# Patient Record
Sex: MALE | Race: WHITE | NOT HISPANIC OR LATINO | Employment: PART TIME | URBAN - NONMETROPOLITAN AREA
[De-identification: names, ages, dates, MRNs, and addresses within clinical notes are randomized per-mention and may not be internally consistent; named-entity substitution may affect disease eponyms.]

---

## 2017-01-04 ENCOUNTER — ANTICOAGULATION VISIT (OUTPATIENT)
Dept: CARDIOLOGY | Facility: CLINIC | Age: 82
End: 2017-01-04

## 2017-01-04 VITALS — SYSTOLIC BLOOD PRESSURE: 92 MMHG | DIASTOLIC BLOOD PRESSURE: 44 MMHG

## 2017-01-04 DIAGNOSIS — I48.91 ATRIAL FIBRILLATION, UNSPECIFIED TYPE (HCC): ICD-10-CM

## 2017-01-04 DIAGNOSIS — Z79.01 LONG-TERM (CURRENT) USE OF ANTICOAGULANTS: ICD-10-CM

## 2017-01-04 LAB — INR PPP: 1.9 (ref 0.9–1.1)

## 2017-01-04 PROCEDURE — 36416 COLLJ CAPILLARY BLOOD SPEC: CPT | Performed by: NURSE PRACTITIONER

## 2017-01-04 PROCEDURE — 85610 PROTHROMBIN TIME: CPT | Performed by: NURSE PRACTITIONER

## 2017-01-04 NOTE — MR AVS SNAPSHOT
Seth Gabriel Dinesh   1/4/2017   Anticoagulation Visit    Dept Phone:  [phone]   Encounter #:  [id number]    Provider:  Ely Dennison, RN   Department:  [facility] CARDIOLOGY                Your Full Care Plan              Your Updated Medication List          This list is accurate as of: 1/4/17  8:01 AM.  Always use your most recent med list.                aspirin 81 MG EC tablet       diltiaZEM  MG 24 hr capsule   Commonly known as:  CARDIZEM CD       ferrous sulfate 325 (65 FE) MG tablet       flecainide 50 MG tablet   Commonly known as:  TAMBOCOR   Take 1 tablet by mouth Every 12 (Twelve) Hours.       furosemide 40 MG tablet   Commonly known as:  LASIX       levothyroxine 50 MCG tablet   Commonly known as:  SYNTHROID, LEVOTHROID       potassium chloride 20 MEQ CR tablet   Commonly known as:  K-DUR,KLOR-CON       tamsulosin 0.4 MG capsule 24 hr capsule   Commonly known as:  FLOMAX       vitamin D3 5000 UNITS capsule capsule       warfarin 4 MG tablet   Commonly known as:  COUMADIN               We Performed the Following     POC INR       You Were Diagnosed With        Codes Comments    Atrial fibrillation, unspecified type     ICD-10-CM: I48.91  ICD-9-CM: 427.31     Long-term (current) use of anticoagulants     ICD-10-CM: Z79.01  ICD-9-CM: V58.61       Instructions     None    Patient Instructions History      Anticoagulation Summary as of 1/4/2017     INR goal 2.0-3.0   Today's INR 1.90!   Next INR check 1/11/2017    Indications   Atrial fibrillation [I48.91] [I48.91]  Long-term (current) use of anticoagulants [Z79.01]         January 2017 Details    Sun Mon Tue Wed Thu Fri Sat     1               2               3               4      4 mg   See details      5      4 mg         6      4 mg         7      4 mg           8      4 mg         9      4 mg         10      4 mg         11      4 mg         12               13               14                 15               16               17               18               19               20               21                 22               23               24               25               26               27               28                 29               30               31                    Date Details    This INR check       Date of next INR:  2017           Upcoming Appointments     Visit Type Date Time Department    ANTICOAGULATION 2017  8:15 AM [facility] CARDIOLOGY [facility]    ULTRASOUND 1/10/2017  1:00 PM [facility] HEART CARE [facility]    ANTICOAGULATION 2017  8:00 AM [facility] CARDIOLOGY [facility]    OFFICE VISIT 2017  2:00 PM [facility] HEART CARE [facility]      MyChart Signup     [facility] Viewpoint allows you to send messages to your doctor, view your test results, renew your prescriptions, schedule appointments, and more. To sign up, go to [employer] and click on the Sign Up Now link in the New User? box. Enter your Hipcamp Activation Code exactly as it appears below along with the last four digits of your Social Security Number and your Date of Birth () to complete the sign-up process. If you do not sign up before the expiration date, you must request a new code.    Hipcamp Activation Code: [email or web address]  Expires: 2017  8:00 AM    If you have questions, you can email [email or web address] or call [phone] to talk to our Hipcamp staff. Remember, Hipcamp is NOT to be used for urgent needs. For medical emergencies, dial 911.               Other Info from Your Visit           Your Appointments     2017  8:15 AM CST   Anticoagulation with COUMADIN CLINIC CARD [facility] CARDIOLOGY (--)    [address]  [address]  [facility] [address] [phone]            Jose Martin 10, 2017  1:00 PM CST   Ultrasound with [facility] (--[facility]    [address]  [facility] [address]    [phone]            Jan 11, 2017  8:00 AM CST   Anticoagulation with COUMADIN CLINIC CARD [facility] CARDIOLOGY (--)    [address] [place] [address] Dr  [address]  [facility] [address] [phone]            Jan 19, 2017  2:00 PM CST   Office Visit with Wu Mcarthur MD   [facility] CARDIOLOGY (--)    [address] [facility] [address]  [facility] [address] [phone]           Arrive 15 minutes prior to appointment.              Allergies     Not on File      Vital Signs     Blood Pressure                   92/44           Results     POC INR      Component Value Standard Range & Units    INR 1.90 0.9 - 1.1

## 2017-01-04 NOTE — PROGRESS NOTES
PT states compliant c tx. PT denies any new medications or bleeding issues. PT denies any s/s of blood clot. PT denies any missed doses or excessive k. Due to rise in INR and pt's lesser therapeutic dose, dose will be left the same but pt will be seen next week. PT instructed to hold green vegs for 2 days. Patient instructed regarding medication; results given and questions answered. Nutritional counseling given.  Dietary factors affecting therapy addressed.  Patient instructed to monitor for excessive bruising or bleeding. PT verbalizes understanding.   Electronically signed by LIZET Velez

## 2017-01-10 ENCOUNTER — OFFICE VISIT (OUTPATIENT)
Dept: CARDIOLOGY | Facility: CLINIC | Age: 82
End: 2017-01-10

## 2017-01-10 DIAGNOSIS — I35.9 NONRHEUMATIC AORTIC VALVE DISORDER, UNSPECIFIED: ICD-10-CM

## 2017-01-10 DIAGNOSIS — Z95.3 S/P AORTIC VALVE REPLACEMENT WITH BIOPROSTHETIC VALVE: ICD-10-CM

## 2017-01-10 DIAGNOSIS — I35.9 AORTIC VALVE DISORDER: ICD-10-CM

## 2017-01-10 DIAGNOSIS — R06.02 SOB (SHORTNESS OF BREATH): ICD-10-CM

## 2017-01-10 DIAGNOSIS — I48.0 PAROXYSMAL ATRIAL FIBRILLATION (HCC): ICD-10-CM

## 2017-01-10 PROCEDURE — 93306 TTE W/DOPPLER COMPLETE: CPT | Performed by: INTERNAL MEDICINE

## 2017-01-10 NOTE — MR AVS SNAPSHOT
Seth Montiel   1/10/2017 1:00 PM   Appointment    Dept Phone:  [phone]   Encounter #:  [id number]    Provider:  [facility]   Department:  [facility] CARDIOLOGY                Your Full Care Plan              Your Updated Medication List          This list is accurate as of: 1/10/17  1:13 PM.  Always use your most recent med list.                aspirin 81 MG EC tablet       diltiaZEM  MG 24 hr capsule   Commonly known as:  CARDIZEM CD       ferrous sulfate 325 (65 FE) MG tablet       flecainide 50 MG tablet   Commonly known as:  TAMBOCOR   Take 1 tablet by mouth Every 12 (Twelve) Hours.       furosemide 40 MG tablet   Commonly known as:  LASIX       levothyroxine 50 MCG tablet   Commonly known as:  SYNTHROID, LEVOTHROID       potassium chloride 20 MEQ CR tablet   Commonly known as:  K-DUR,KLOR-CON       tamsulosin 0.4 MG capsule 24 hr capsule   Commonly known as:  FLOMAX       vitamin D3 5000 UNITS capsule capsule       warfarin 4 MG tablet   Commonly known as:  COUMADIN               Instructions     None    Patient Instructions History      Upcoming Appointments     Visit Type Date Time Department    ULTRASOUND 1/10/2017  1:00 PM [facility]    ANTICOAGULATION 2017  8:00 AM [facility]    OFFICE VISIT 2017  2:00 PM [facility] Sign[facility]     [facility] Balakam allows you to send messages to your doctor, view your test results, renew your prescriptions, schedule appointments, and more. To sign up, go to [employer] and click on the Sign Up Now link in the New User? box. Enter your Balakam Activation Code exactly as it appears below along with the last four digits of your Social Security Number and your Date of Birth () to complete the sign-up process. If you do not sign up before the expiration date, you must request a new code.    Balakam Activation Code:  [email or web address]  Expires: 1/11/2017  8:00 AM    If you have questions, you can email [email or web address] or call [phone] to talk to our MyChart staff. Remember, Familiohart is NOT to be used for urgent needs. For medical emergencies, dial 911.               Other Info from Your Visit           Your Appointments     Jan 11, 2017  8:00 AM CST   Anticoagulation with COUMADIN CLINIC CARD [facility] CARDIOLOGY (--)    [address] [place] [address] [address]  [address] [place] [address] [phone]            Jan 19, 2017  2:00 PM CST   Office Visit with Wu Mcarthur MD   [facility] CARDIOLOGY (--)    [address] [address] [address]  [facility] [address] [phone]           Arrive 15 minutes prior to appointment.              Allergies     Not on File

## 2017-01-11 ENCOUNTER — ANTICOAGULATION VISIT (OUTPATIENT)
Dept: CARDIOLOGY | Facility: CLINIC | Age: 82
End: 2017-01-11

## 2017-01-11 VITALS — SYSTOLIC BLOOD PRESSURE: 102 MMHG | DIASTOLIC BLOOD PRESSURE: 44 MMHG | HEART RATE: 68 BPM

## 2017-01-11 DIAGNOSIS — Z79.01 LONG-TERM (CURRENT) USE OF ANTICOAGULANTS: ICD-10-CM

## 2017-01-11 DIAGNOSIS — I48.91 ATRIAL FIBRILLATION, UNSPECIFIED TYPE (HCC): ICD-10-CM

## 2017-01-11 LAB — INR PPP: 1.9 (ref 0.9–1.1)

## 2017-01-11 PROCEDURE — 85610 PROTHROMBIN TIME: CPT | Performed by: NURSE PRACTITIONER

## 2017-01-11 PROCEDURE — 99211 OFF/OP EST MAY X REQ PHY/QHP: CPT | Performed by: NURSE PRACTITIONER

## 2017-01-11 PROCEDURE — 36416 COLLJ CAPILLARY BLOOD SPEC: CPT | Performed by: NURSE PRACTITIONER

## 2017-01-11 NOTE — MR AVS SNAPSHOT
Seth Montiel   1/11/2017   Anticoagulation Visit    Dept Phone:  [phone]   Encounter #:  [id number]    Provider:  Enzo Henley RN   Department:  [facility] CARDIOLOGY                Your Full Care Plan              Your Updated Medication List          This list is accurate as of: 1/11/17  7:51 AM.  Always use your most recent med list.                aspirin 81 MG EC tablet       diltiaZEM  MG 24 hr capsule   Commonly known as:  CARDIZEM CD       ferrous sulfate 325 (65 FE) MG tablet       flecainide 50 MG tablet   Commonly known as:  TAMBOCOR   Take 1 tablet by mouth Every 12 (Twelve) Hours.       furosemide 40 MG tablet   Commonly known as:  LASIX       levothyroxine 50 MCG tablet   Commonly known as:  SYNTHROID, LEVOTHROID       potassium chloride 20 MEQ CR tablet   Commonly known as:  K-DUR,KLOR-CON       tamsulosin 0.4 MG capsule 24 hr capsule   Commonly known as:  FLOMAX       vitamin D3 5000 UNITS capsule capsule       warfarin 4 MG tablet   Commonly known as:  COUMADIN               We Performed the Following     POC INR       You Were Diagnosed With        Codes Comments    Atrial fibrillation, unspecified type     ICD-10-CM: I48.91  ICD-9-CM: 427.31     Long-term (current) use of anticoagulants     ICD-10-CM: Z79.01  ICD-9-CM: V58.61       Instructions     None    Patient Instructions History      Anticoagulation Summary as of 1/11/2017     INR goal 2.0-3.0   Today's INR 1.90!   Next INR check 1/18/2017    Indications   Atrial fibrillation [I48.91] [I48.91]  Long-term (current) use of anticoagulants [Z79.01]         January 2017 Details    Sun Mon Tue Wed Thu Fri Sat     1               2               3               4               5               6               7                 8               9               10               11      6 mg   See details      12      4 mg         13      4 mg         14      4 mg           15      4 mg          16      4 mg         17      4 mg         18      6 mg         19               20               21                 22               23               24               25               26               27               28                 29               30               31                    Date Details    This INR check       Date of next INR:  2017           Upcoming Appointments     Visit Type Date Time Department    ANTICOAGULATION 2017  8:00 AM [facility] CARDIOLOGY [facility]    ANTICOAGULATION 2017  8:15 AM [facility] CARDIOLOGY [facility]    OFFICE VISIT 2017  2:00 PM [facility] Sign[facility]     [facility] Pogoseat allows you to send messages to your doctor, view your test results, renew your prescriptions, schedule appointments, and more. To sign up, go to [employer] and click on the Sign Up Now link in the New User? box. Enter your Pogoseat Activation Code exactly as it appears below along with the last four digits of your Social Security Number and your Date of Birth () to complete the sign-up process. If you do not sign up before the expiration date, you must request a new code.    Pogoseat Activation Code: [email or web address]  Expires: 2017  8:00 AM    If you have questions, you can email [email or web address] or call [phone] to talk to our Pogoseat staff. Remember, Pogoseat is NOT to be used for urgent needs. For medical emergencies, dial 911.               Other Info from Your Visit           Your Appointments     2017  8:00 AM CST   Anticoagulation with COUMADIN CLINIC CARD [facility] CARDIOLOGY (--)    [address] [place] [address] Dr  [address] [place] [id number] [phone]            2017  8:15 AM CST   Anticoagulation with COUMADIN CLINIC CARD [facility] CARDIOLOGY (--)    [address] [place] [address] [address]  [address] [place] [id number] [phone]              2017  2:00 PM CST   Office Visit with Wu Mcarthur MD   [facility] CARDIOLOGY (--)    [address] [facility] [address]  [facility] [address] [phone]           Arrive 15 minutes prior to appointment.              Allergies     Not on File      Vital Signs     Blood Pressure Pulse                102/44 68          Results     POC INR      Component Value Standard Range & Units    INR 1.90 0.9 - 1.1

## 2017-01-11 NOTE — PROGRESS NOTES
Pt denies missed coumadin doses or consuming too much green.  Pt states medications have not changed, no bleeding issues at this time.  Adjusted coumadin dose slightly and instructed pt to limit green intake today.  Recheck INR next wk.  Pt verbalizes.  Patient instructed regarding medication; results given and questions answered. Nutritional counseling given.  Dietary factors affecting therapy addressed.  Patient instructed to monitor for excessive bruising or bleeding.  Electronically signed by LIZET Bey

## 2017-01-18 ENCOUNTER — ANTICOAGULATION VISIT (OUTPATIENT)
Dept: CARDIOLOGY | Facility: CLINIC | Age: 82
End: 2017-01-18

## 2017-01-18 VITALS — HEART RATE: 59 BPM | SYSTOLIC BLOOD PRESSURE: 108 MMHG | DIASTOLIC BLOOD PRESSURE: 52 MMHG

## 2017-01-18 DIAGNOSIS — Z79.01 LONG-TERM (CURRENT) USE OF ANTICOAGULANTS: ICD-10-CM

## 2017-01-18 DIAGNOSIS — I48.91 ATRIAL FIBRILLATION, UNSPECIFIED TYPE (HCC): ICD-10-CM

## 2017-01-18 LAB — INR PPP: 2.3 (ref 0.9–1.1)

## 2017-01-18 PROCEDURE — 36416 COLLJ CAPILLARY BLOOD SPEC: CPT | Performed by: NURSE PRACTITIONER

## 2017-01-18 PROCEDURE — 85610 PROTHROMBIN TIME: CPT | Performed by: NURSE PRACTITIONER

## 2017-01-18 NOTE — MR AVS SNAPSHOT
Seth Gabriel Dinesh   1/18/2017   Anticoagulation Visit    Dept Phone:  [phone]   Encounter #:  [id number]    Provider:  Ely Dennison, RN   Department:  [facility] CARDIOLOGY                Your Full Care Plan              Your Updated Medication List          This list is accurate as of: 1/18/17  8:13 AM.  Always use your most recent med list.                aspirin 81 MG EC tablet       diltiaZEM  MG 24 hr capsule   Commonly known as:  CARDIZEM CD       ferrous sulfate 325 (65 FE) MG tablet       flecainide 50 MG tablet   Commonly known as:  TAMBOCOR   Take 1 tablet by mouth Every 12 (Twelve) Hours.       furosemide 40 MG tablet   Commonly known as:  LASIX       levothyroxine 50 MCG tablet   Commonly known as:  SYNTHROID, LEVOTHROID       potassium chloride 20 MEQ CR tablet   Commonly known as:  K-DUR,KLOR-CON       tamsulosin 0.4 MG capsule 24 hr capsule   Commonly known as:  FLOMAX       vitamin D3 5000 UNITS capsule capsule       warfarin 4 MG tablet   Commonly known as:  COUMADIN               We Performed the Following     POC INR       You Were Diagnosed With        Codes Comments    Atrial fibrillation, unspecified type     ICD-10-CM: I48.91  ICD-9-CM: 427.31     Long-term (current) use of anticoagulants     ICD-10-CM: Z79.01  ICD-9-CM: V58.61       Instructions     None    Patient Instructions History      Anticoagulation Summary as of 1/18/2017     INR goal 2.0-3.0   Today's INR 2.30   Next INR check 2/1/2017    Indications   Atrial fibrillation [I48.91] [I48.91]  Long-term (current) use of anticoagulants [Z79.01]         January 2017 Details    Sun Mon Tue Wed Thu Fri Sat     1               2               3               4               5               6               7                 8               9               10               11               12               13               14                 15               16               17                  18      6 mg   See details      19      4 mg         20      4 mg         21      4 mg           22      4 mg         23      4 mg         24      4 mg         25      6 mg         26      4 mg         27      4 mg         28      4 mg           29      4 mg         30      4 mg         31      4 mg              Date Details   01/18 This INR check                 February 2017 Details    Sun Mon Tue Wed Thu Fri Sat        1      6 mg         2               3               4                 5               6               7               8               9               10               11                 12               13               14               15               16               17               18                 19               20               21               22               23               24               25                 26               27               28                    Date Details   No additional details    Date of next INR:  2/1/2017           Upcoming Appointments     Visit Type Date Time Department    ANTICOAGULATION 1/18/2017  8:15 AM [facility] CARDIOLOGY [facility]    OFFICE VISIT 1/19/2017  2:00 PM [facility] HEART CARE [facility]    ANTICOAGULATION 2/1/2017  8:00 AM [facility] CARDIOLOGY [facility]      MyChart Signup     Our records indicate that you have declined [facility] TapMyBackt signup. If you would like to sign up for TapMyBackt, please email [email or web address] or call [phone] to obtain an activation code.             Other Info from Your Visit           Your Appointments     Jan 18, 2017  8:15 AM CST   Anticoagulation with COUMADIN CLINIC CARD [facility] CARDIOLOGY (--)    [address] [place] [address] Dr  [address]  [facility] [address] [phone]            Jan 19, 2017  2:00 PM CST   Office Visit with Wu Mcarthur MD   [facility] CARDIOLOGY (--)    [address]  [facility] [address] [phone]            Arrive 15 minutes prior to appointment.            Feb 01, 2017  8:00 AM CST   Anticoagulation with COUMADIN CLINIC CARD [facility] CARDIOLOGY (--)    [address] [place] [address] [address]  [address] [place] [address] [phone]              Allergies     Not on File      Vital Signs     Blood Pressure Pulse                108/52 59          Results     POC INR      Component Value Standard Range & Units    INR 2.30 0.9 - 1.1

## 2017-01-18 NOTE — PROGRESS NOTES
PT states compliant c tx. PT denies any new medications or bleeding issues. PT denies any s/s of blood clot. PT instructed to continue same dose and Coumadin friendly diet. PT will be seen in 2 weeks at his request. Patient instructed regarding medication; results given and questions answered. Nutritional counseling given.  Dietary factors affecting therapy addressed.  Patient instructed to monitor for excessive bruising or bleeding.  Electronically signed by LIZET Velez

## 2017-01-19 ENCOUNTER — OFFICE VISIT (OUTPATIENT)
Dept: CARDIOLOGY | Facility: CLINIC | Age: 82
End: 2017-01-19

## 2017-01-19 VITALS
SYSTOLIC BLOOD PRESSURE: 120 MMHG | BODY MASS INDEX: 25.33 KG/M2 | DIASTOLIC BLOOD PRESSURE: 54 MMHG | WEIGHT: 171 LBS | HEART RATE: 57 BPM | HEIGHT: 69 IN

## 2017-01-19 DIAGNOSIS — I35.9 AORTIC VALVE DISORDER: Primary | ICD-10-CM

## 2017-01-19 DIAGNOSIS — Z79.01 LONG-TERM (CURRENT) USE OF ANTICOAGULANTS: ICD-10-CM

## 2017-01-19 DIAGNOSIS — R06.02 SOB (SHORTNESS OF BREATH): ICD-10-CM

## 2017-01-19 DIAGNOSIS — I48.91 ATRIAL FIBRILLATION, UNSPECIFIED TYPE (HCC): ICD-10-CM

## 2017-01-19 PROCEDURE — 93000 ELECTROCARDIOGRAM COMPLETE: CPT | Performed by: INTERNAL MEDICINE

## 2017-01-19 PROCEDURE — 99213 OFFICE O/P EST LOW 20 MIN: CPT | Performed by: INTERNAL MEDICINE

## 2017-01-19 NOTE — MR AVS SNAPSHOT
Seth Montiel   1/19/2017 2:00 PM   Office Visit    Dept Phone:  [phone]   Encounter #:  [id number]    Provider:  Wu Mcarthur MD   Department:  [facility] CARDIOLOGY                Your Full Care Plan              Your Updated Medication List          This list is accurate as of: 1/19/17  1:32 PM.  Always use your most recent med list.                aspirin 81 MG EC tablet       diltiaZEM  MG 24 hr capsule   Commonly known as:  CARDIZEM CD       ferrous sulfate 325 (65 FE) MG tablet       flecainide 50 MG tablet   Commonly known as:  TAMBOCOR   Take 1 tablet by mouth Every 12 (Twelve) Hours.       furosemide 40 MG tablet   Commonly known as:  LASIX       levothyroxine 50 MCG tablet   Commonly known as:  SYNTHROID, LEVOTHROID       potassium chloride 20 MEQ CR tablet   Commonly known as:  K-DUR,KLOR-CON       tamsulosin 0.4 MG capsule 24 hr capsule   Commonly known as:  FLOMAX       vitamin D3 5000 UNITS capsule capsule       warfarin 4 MG tablet   Commonly known as:  COUMADIN               You Were Diagnosed With        Codes Comments    Aortic valve disorder    -  Primary ICD-10-CM: I35.9  ICD-9-CM: 424.1     SOB (shortness of breath)     ICD-10-CM: R06.02  ICD-9-CM: 786.05     Long-term (current) use of anticoagulants     ICD-10-CM: Z79.01  ICD-9-CM: V58.61     Atrial fibrillation, unspecified type     ICD-10-CM: I48.91  ICD-9-CM: 427.31       Instructions     None    Patient Instructions History      Upcoming Appointments     Visit Type Date Time Department    OFFICE VISIT 1/19/2017  2:00 PM [facility] HEART [facility]    ANTICOAGULATION 2/1/2017  8:00 AM [facility] CARDIOLOGY [facility]    OFFICE VISIT 7/20/2017  2:00 PM [facility]      MyChart Signup     Our records indicate that you have declined [facility] signup. If you would like to sign up for [facility], please email [email or web address] or call [phone] to obtain an  "activation code.             Other Info from Your Visit           Your Appointments     Jan 19, 2017  2:00 PM CST   Office Visit with Wu Mcarthur MD   [facility] CARDIOLOGY (--)    [address]  [facility] [address] [phone]           Arrive 15 minutes prior to appointment.            Feb 01, 2017  8:00 AM CST   Anticoagulation with COUMADIN CLINIC CARD [facility] CARDIOLOGY (--)    [address] [place] [address] [address]  [address] [place] [address] [phone]            Jul 20, 2017  2:00 PM CDT   Office Visit with Wu Mcarthur MD   [facility] CARDIOLOGY (--)    [address]  [facility] [address] [phone]           Arrive 15 minutes prior to appointment.              Allergies     No Known Allergies      Vital Signs     Blood Pressure Pulse Height Weight Body Mass Index Smoking Status    120/54 57 69\" (175.3 cm) 171 lb (77.6 kg) 25.25 kg/m2 Never Smoker      Problems and Diagnoses Noted     Aortic heart valve disorder    SOB (shortness of breath)        "

## 2017-01-19 NOTE — PROGRESS NOTES
Seth Montiel  81 y.o. male    01/19/2017  1. Aortic valve disorder    2. SOB (shortness of breath)    3. Long-term (current) use of anticoagulants    4. Atrial fibrillation, unspecified type        History of Present Illness    Mr. Montiel is here for follow-up of his multiple cardiac problems.  He denied any chest pain but does have chronic dyspnea on exertion which is most likely secondary to COPD.  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  Will be followed closely.  No signs of congestive heart failure or bronchospasm was noted today.  He is on home oxygen.  Blood pressure was in the normal range.  EKG showed sinus rhythm with a heart rate of 57 bpm with voltage criteria for left ventricular hypertrophy.  He has remained in sinus rhythm and has been compliant with anticoagulation with the Coumadin.  His PT and [facility]R [facility], and is in the therapeutic range.        SUBJECTIVE    No Known Allergies      Past Medical History   Diagnosis Date   • Aortic valve disorder    • Atrial fibrillation    • COPD (chronic obstructive pulmonary disease)    • Hyperlipidemia    • Hypertension    • Nonrheumatic aortic valve disorder, unspecified    • SOB (shortness of breath)          Past Surgical History   Procedure Laterality Date   • Cardiac surgery     • Rotator cuff repair     • Appendectomy     • Gallbladder surgery           No family history on file.      Social History     Social History   • Marital status:      Spouse name: N/A   • Number of children: N/A   • Years of education: N/A     Occupational History   • Not on file.     Social History Main Topics   • Smoking status: Never Smoker   • Smokeless tobacco: Never Used   • Alcohol use Yes   • Drug use: No   • Sexual activity: Defer     Other Topics Concern   • Not on file     Social History Narrative  "        Current Outpatient Prescriptions   Medication Sig Dispense Refill   • aspirin 81 MG EC tablet Take 81 mg by mouth daily.     • Cholecalciferol (VITAMIN D3) 5000 UNITS capsule capsule Take 5,000 Units by mouth daily.     • diltiazem CD (CARDIZEM CD) 240 MG 24 hr capsule Take 240 mg by mouth daily.     • ferrous sulfate 325 (65 FE) MG tablet Take 325 mg by mouth 2 (two) times a day.     • flecainide (TAMBOCOR) 50 MG tablet Take 1 tablet by mouth Every 12 (Twelve) Hours. 60 tablet 6   • furosemide (LASIX) 40 MG tablet Take 40 mg by mouth 2 (two) times a day.     • levothyroxine (SYNTHROID, LEVOTHROID) 50 MCG tablet Take 50 mcg by mouth daily.     • potassium chloride (K-DUR,KLOR-CON) 20 MEQ CR tablet Take 20 mEq by mouth daily.     • tamsulosin (FLOMAX) 0.4 MG capsule 24 hr capsule Take 1 capsule by mouth every night.     • warfarin (COUMADIN) 4 MG tablet Take 4 mg by mouth daily. As directed per [facility]       No current facility-administered medications for this visit.          OBJECTIVE    Visit Vitals   • /54   • Pulse 57   • Ht 69\" (175.3 cm)   • Wt 171 lb (77.6 kg)   • BMI 25.25 kg/m2           Review of Systems     Constitutional:  Denies recent weight loss, weight gain, fever or chills, no change in exercise tolerance     HENT:  Denies any hearing loss, epistaxis, hoarseness, or difficulty speaking.     Eyes: Wears eyeglasses or contact lenses     Respiratory:  Dyspnea with exertion,no cough, wheezing, or hemoptysis.     Cardiovascular: Negative for palpations, chest pain, orthopnea, PND, peripheral edema, syncope, or claudication.     Gastrointestinal:  Denies change in bowel habits, dyspepsia, ulcer disease, hematochezia, or melena.     Endocrine: Negative for cold intolerance, heat intolerance, polydipsia, polyphagia and polyuria. Denies any history of weight change, heat/cold intolerance, polydipsia, polyuria     Genitourinary: Negative.      Musculoskeletal: Denies any history of " arthritic symptoms or back problems     Skin:  Denies any change in hair or nails, rashes, or skin lesions.     Allergic/Immunologic: Negative.  Negative for environmental allergies, food allergies and immunocompromised state.     Neurological:  Denies any history of recurrent headaches, strokes, TIA, or seizure disorder.     Hematological: Denies any food allergies, seasonal allergies, bleeding disorders, or lymphadenopathy.     Psychiatric/Behavioral: Denies any history of depression, substance abuse, or change in cognitive function.         Physical Exam     Constitutional: Cooperative, alert and oriented, chronically ill appearing    HENT:   Head: Normocephalic, normal hair patterns, no masses or tenderness.  Ears, Nose, and Throat: No gross abnormalities. No pallor or cyanosis. Dentition good.   Eyes: EOMS intact, PERRL, conjunctivae and lids unremarkable. Fundoscopic exam and visual fields not performed.   Neck: No palpable masses or adenopathy, no thyromegaly, no JVD, carotid pulses are full and equal bilaterally and without  Bruits.     Cardiovascular: Regular rhythm, S1 and S2 normal, no S3 or S4. Apical impulse not displaced. No murmurs, gallops, or rubs detected.     Pulmonary/Chest: Chest: Increased AP diameter, no tenderness to palpation, normal respiratory excursion, no intercostal retraction, no use of accessory muscles.            Pulmonary: Normal breath sounds. No rales or ronchi.    Abdominal: Abdomen soft, bowel sounds normoactive, no masses, no hepatosplenomegaly, non-tender, no bruits.     Musculoskeletal: No deformities, clubbing, cyanosis, erythema, or edema observed. There are no spinal abnormalities noted. Normal muscle strength and tone. Pulses full and equal in all extremities, no bruits auscultated.     Neurological: No gross motor or sensory deficits noted, affect appropriate, oriented to time, person, place.     Skin: Warm and dry to the touch, no apparent skin lesions or masses noted.      Psychiatric: He has a normal mood and affect. His behavior is normal. Judgment and thought content normal.           ECG 12 Lead  Date/Time: 1/19/2017 2:22 PM  Performed by: CHIVO MCARTHUR  Authorized by: CHIVO MCARTHUR   Comparison: not compared with previous ECG   Rhythm: sinus rhythm and sinus bradycardia  Rate: normal  QRS axis: normal  Comments: EKG showed sinus rhythm with a heart rate of 57 bpm.  QRS duration was 102 ms.  There was left ventricular hypertrophy.  QTc interval was 420 ms.              Lab Results   Component Value Date    WBC 6.6 07/19/2016    HGB 9.6 (L) 07/19/2016    HCT 29.7 (L) 07/19/2016    MCV 92.5 07/19/2016     07/19/2016     Lab Results   Component Value Date    GLUCOSE 76 07/19/2016    BUN 27 (H) 07/19/2016    CREATININE 1.3 07/19/2016    CO2 43 (H) 07/19/2016    CALCIUM 7.8 (L) 07/19/2016    ALBUMIN 2.7 (L) 07/17/2016    AST 44 07/17/2016    ALT 55 07/17/2016     No results found for: CHOL  No results found for: TRIG  No results found for: HDL  No results found for: LDLCALC  No results found for: LDL  No results found for: HDLLDLRATIO  No components found for: CHOLHDL  No results found for: HGBA1C  Lab Results   Component Value Date    TSH 3.75 04/22/2015           ASSESSMENT AND PLAN  Mr. Montiel has multiple cardiac issues but fortunately is compensated with no evidence of congestive heart failure or angina.  He remains in sinus rhythm.  QTc interval was 420 ms.  Isn't antiplatelet therapy with aspirin, antiarrhythmic therapy with flecainide, anticoagulation with warfarin and rate control with diltiazem CD have been continued.  He is on diuretics on a regular basis.    Diagnoses and all orders for this visit:    Aortic valve disorder    SOB (shortness of breath)    Long-term (current) use of anticoagulants    Atrial fibrillation, unspecified type        Chivo Mcarthur MD  1/19/2017  2:16 PM

## 2017-02-01 ENCOUNTER — ANTICOAGULATION VISIT (OUTPATIENT)
Dept: CARDIAC SURGERY | Facility: CLINIC | Age: 82
End: 2017-02-01

## 2017-02-01 VITALS — SYSTOLIC BLOOD PRESSURE: 112 MMHG | DIASTOLIC BLOOD PRESSURE: 50 MMHG

## 2017-02-01 DIAGNOSIS — Z79.01 LONG-TERM (CURRENT) USE OF ANTICOAGULANTS: ICD-10-CM

## 2017-02-01 DIAGNOSIS — I48.91 ATRIAL FIBRILLATION, UNSPECIFIED TYPE (HCC): ICD-10-CM

## 2017-02-01 LAB — INR PPP: 2.7 (ref 0.9–1.1)

## 2017-02-01 PROCEDURE — 36416 COLLJ CAPILLARY BLOOD SPEC: CPT | Performed by: NURSE PRACTITIONER

## 2017-02-01 PROCEDURE — 85610 PROTHROMBIN TIME: CPT | Performed by: NURSE PRACTITIONER

## 2017-02-01 NOTE — PROGRESS NOTES
Pt states compliant c tx. PT denies any new medications or bleeding issues. PT denies any s/s of blood clot. PT instructed to continue same dose and Coumadin friendly diet. PT will be seen in 3 weeks. Patient instructed regarding medication; results given and questions answered. Nutritional counseling given.  Dietary factors affecting therapy addressed.  Patient instructed to monitor for excessive bruising or bleeding. PT verbalizes understanding.   Electronically signed by LIZET Velez

## 2017-02-22 ENCOUNTER — ANTICOAGULATION VISIT (OUTPATIENT)
Dept: CARDIAC SURGERY | Facility: CLINIC | Age: 82
End: 2017-02-22

## 2017-02-22 VITALS
HEART RATE: 60 BPM | DIASTOLIC BLOOD PRESSURE: 48 MMHG | BODY MASS INDEX: 25.99 KG/M2 | SYSTOLIC BLOOD PRESSURE: 100 MMHG | WEIGHT: 176 LBS

## 2017-02-22 DIAGNOSIS — Z79.01 LONG-TERM (CURRENT) USE OF ANTICOAGULANTS: ICD-10-CM

## 2017-02-22 DIAGNOSIS — I48.91 ATRIAL FIBRILLATION, UNSPECIFIED TYPE (HCC): ICD-10-CM

## 2017-02-22 LAB — INR PPP: 2.3 (ref 0.9–1.1)

## 2017-02-22 PROCEDURE — 85610 PROTHROMBIN TIME: CPT | Performed by: NURSE PRACTITIONER

## 2017-02-22 NOTE — PROGRESS NOTES
Patient states no med changes or bleeding problems or unexplained bruising. Patient instructed to continue current dosing schedule. Verbalizes understanding. Will recheck 1 month.   Patient instructed regarding medication; results given and questions answered. Nutritional counseling given.  Dietary factors affecting therapy addressed.  Patient instructed to monitor for excessive bruising or bleeding.  Electronically signed by LIZET Velez

## 2017-03-22 ENCOUNTER — ANTICOAGULATION VISIT (OUTPATIENT)
Dept: CARDIAC SURGERY | Facility: CLINIC | Age: 82
End: 2017-03-22

## 2017-03-22 VITALS — SYSTOLIC BLOOD PRESSURE: 118 MMHG | DIASTOLIC BLOOD PRESSURE: 56 MMHG | HEART RATE: 64 BPM

## 2017-03-22 DIAGNOSIS — I48.91 ATRIAL FIBRILLATION, UNSPECIFIED TYPE (HCC): ICD-10-CM

## 2017-03-22 DIAGNOSIS — Z79.01 LONG-TERM (CURRENT) USE OF ANTICOAGULANTS: ICD-10-CM

## 2017-03-22 LAB — INR PPP: 1.5 (ref 0.9–1.1)

## 2017-03-22 PROCEDURE — 99211 OFF/OP EST MAY X REQ PHY/QHP: CPT | Performed by: NURSE PRACTITIONER

## 2017-03-22 PROCEDURE — 85610 PROTHROMBIN TIME: CPT | Performed by: NURSE PRACTITIONER

## 2017-03-22 NOTE — PROGRESS NOTES
Pt denies missed coumadin doses or consuming too  Much green.  Pt states medications have not changes, no bleeding issues.  Adjusted coumadin dose and instructed pt to limit green intake for 3 days.  Recheck INR next wk.  Pt verbalizes. Patient instructed regarding medication; results given and questions answered. Nutritional counseling given.  Dietary factors affecting therapy addressed.  Patient instructed to monitor for excessive bruising or bleeding.  Electronically signed by LIZET Velez

## 2017-03-28 ENCOUNTER — ANTICOAGULATION VISIT (OUTPATIENT)
Dept: CARDIAC SURGERY | Facility: CLINIC | Age: 82
End: 2017-03-28

## 2017-03-28 VITALS — SYSTOLIC BLOOD PRESSURE: 94 MMHG | DIASTOLIC BLOOD PRESSURE: 57 MMHG | HEART RATE: 72 BPM

## 2017-03-28 DIAGNOSIS — Z79.01 LONG-TERM (CURRENT) USE OF ANTICOAGULANTS: ICD-10-CM

## 2017-03-28 DIAGNOSIS — I48.91 ATRIAL FIBRILLATION, UNSPECIFIED TYPE (HCC): ICD-10-CM

## 2017-03-28 LAB — INR PPP: 2.3 (ref 0.9–1.1)

## 2017-03-28 PROCEDURE — 85610 PROTHROMBIN TIME: CPT | Performed by: NURSE PRACTITIONER

## 2017-03-28 PROCEDURE — 99211 OFF/OP EST MAY X REQ PHY/QHP: CPT | Performed by: NURSE PRACTITIONER

## 2017-03-28 NOTE — PROGRESS NOTES
Pt denies med changes or bleeding problems. Due to rapid rise in INR pt will be placed on most recent therapeutic dose and rechecked in 2 weeks. Patient instructed regarding medication; results given and questions answered. Nutritional counseling given.  Dietary factors affecting therapy addressed.  Patient instructed to monitor for excessive bruising or bleeding.           This document has been electronically signed by LIZET Bey on March 28, 2017 9:59 AM

## 2017-04-11 ENCOUNTER — ANTICOAGULATION VISIT (OUTPATIENT)
Dept: CARDIAC SURGERY | Facility: CLINIC | Age: 82
End: 2017-04-11

## 2017-04-11 VITALS — DIASTOLIC BLOOD PRESSURE: 53 MMHG | HEART RATE: 72 BPM | SYSTOLIC BLOOD PRESSURE: 105 MMHG

## 2017-04-11 DIAGNOSIS — Z79.01 LONG-TERM (CURRENT) USE OF ANTICOAGULANTS: ICD-10-CM

## 2017-04-11 DIAGNOSIS — I48.91 ATRIAL FIBRILLATION, UNSPECIFIED TYPE (HCC): ICD-10-CM

## 2017-04-11 LAB — INR PPP: 2.2 (ref 0.9–1.1)

## 2017-04-11 PROCEDURE — 85610 PROTHROMBIN TIME: CPT | Performed by: NURSE PRACTITIONER

## 2017-04-11 NOTE — PROGRESS NOTES
Pt denies med changes or bleeding problems. Patient instructed regarding medication; results given and questions answered. Nutritional counseling given.  Dietary factors affecting therapy addressed.  Patient instructed to monitor for excessive bruising or bleeding. Will recheck in 3 weeks.          This document has been electronically signed by LIZET Bey on April 11, 2017 9:28 AM

## 2017-05-02 ENCOUNTER — ANTICOAGULATION VISIT (OUTPATIENT)
Dept: CARDIAC SURGERY | Facility: CLINIC | Age: 82
End: 2017-05-02

## 2017-05-02 VITALS — SYSTOLIC BLOOD PRESSURE: 116 MMHG | DIASTOLIC BLOOD PRESSURE: 56 MMHG | HEART RATE: 80 BPM

## 2017-05-02 DIAGNOSIS — I48.91 ATRIAL FIBRILLATION, UNSPECIFIED TYPE (HCC): ICD-10-CM

## 2017-05-02 DIAGNOSIS — Z79.01 LONG-TERM (CURRENT) USE OF ANTICOAGULANTS: ICD-10-CM

## 2017-05-02 LAB — INR PPP: 2 (ref 0.9–1.1)

## 2017-05-02 PROCEDURE — 85610 PROTHROMBIN TIME: CPT | Performed by: NURSE PRACTITIONER

## 2017-05-30 ENCOUNTER — ANTICOAGULATION VISIT (OUTPATIENT)
Dept: CARDIAC SURGERY | Facility: CLINIC | Age: 82
End: 2017-05-30

## 2017-05-30 VITALS — DIASTOLIC BLOOD PRESSURE: 60 MMHG | SYSTOLIC BLOOD PRESSURE: 178 MMHG | HEART RATE: 64 BPM

## 2017-05-30 DIAGNOSIS — Z79.01 LONG-TERM (CURRENT) USE OF ANTICOAGULANTS: ICD-10-CM

## 2017-05-30 DIAGNOSIS — I48.91 ATRIAL FIBRILLATION, UNSPECIFIED TYPE (HCC): ICD-10-CM

## 2017-05-30 LAB — INR PPP: 1.5 (ref 0.9–1.1)

## 2017-05-30 PROCEDURE — 99211 OFF/OP EST MAY X REQ PHY/QHP: CPT | Performed by: NURSE PRACTITIONER

## 2017-05-30 PROCEDURE — 85610 PROTHROMBIN TIME: CPT | Performed by: NURSE PRACTITIONER

## 2017-06-02 RX ORDER — FLECAINIDE ACETATE 50 MG/1
TABLET ORAL
Qty: 60 TABLET | Refills: 6 | Status: SHIPPED | OUTPATIENT
Start: 2017-06-02 | End: 2018-02-16 | Stop reason: SDUPTHER

## 2017-06-05 ENCOUNTER — ANTICOAGULATION VISIT (OUTPATIENT)
Dept: CARDIAC SURGERY | Facility: CLINIC | Age: 82
End: 2017-06-05

## 2017-06-05 VITALS — SYSTOLIC BLOOD PRESSURE: 112 MMHG | DIASTOLIC BLOOD PRESSURE: 44 MMHG

## 2017-06-05 DIAGNOSIS — I48.91 ATRIAL FIBRILLATION, UNSPECIFIED TYPE (HCC): ICD-10-CM

## 2017-06-05 DIAGNOSIS — Z79.01 LONG-TERM (CURRENT) USE OF ANTICOAGULANTS: ICD-10-CM

## 2017-06-05 LAB — INR PPP: 1.8 (ref 0.9–1.1)

## 2017-06-05 PROCEDURE — 99211 OFF/OP EST MAY X REQ PHY/QHP: CPT | Performed by: NURSE PRACTITIONER

## 2017-06-05 PROCEDURE — 85610 PROTHROMBIN TIME: CPT | Performed by: NURSE PRACTITIONER

## 2017-06-05 NOTE — PROGRESS NOTES
PT states compliant c tx. PT denies any new medications or bleeding issues. PT denies any s/s of blood clot. PT denies any missed doses or excessive k. Adjusted pt's dose and instructed to hold green vegs for 2 days. PT will be seen next week. Patient instructed regarding medication; results given and questions answered. Nutritional counseling given.  Dietary factors affecting therapy addressed.  Patient instructed to monitor for excessive bruising or bleeding. PT verbalizes understanding.   Electronically signed by LIZET Velez

## 2017-06-12 ENCOUNTER — ANTICOAGULATION VISIT (OUTPATIENT)
Dept: CARDIAC SURGERY | Facility: CLINIC | Age: 82
End: 2017-06-12

## 2017-06-12 VITALS — SYSTOLIC BLOOD PRESSURE: 142 MMHG | DIASTOLIC BLOOD PRESSURE: 58 MMHG

## 2017-06-12 DIAGNOSIS — Z71.89 ENCOUNTER FOR ANTICOAGULATION DISCUSSION AND COUNSELING: Primary | ICD-10-CM

## 2017-06-12 DIAGNOSIS — Z79.01 LONG-TERM (CURRENT) USE OF ANTICOAGULANTS: ICD-10-CM

## 2017-06-12 DIAGNOSIS — I48.91 ATRIAL FIBRILLATION, UNSPECIFIED TYPE (HCC): ICD-10-CM

## 2017-06-12 LAB — INR PPP: 2.2 (ref 0.9–1.1)

## 2017-06-12 PROCEDURE — 85610 PROTHROMBIN TIME: CPT | Performed by: NURSE PRACTITIONER

## 2017-06-12 NOTE — PROGRESS NOTES
PT states compliant c tx. PT denies any new medications or bleeding issues. PT denies any s/s of blood clot. PT instructed to continue same dose and Coumadin friendly diet. PT will be seen in 10 days. Patient instructed regarding medication; results given and questions answered. Nutritional counseling given.  Dietary factors affecting therapy addressed.  Patient instructed to monitor for excessive bruising or bleeding. PT verbalizes understanding.   Electronically signed by LIZET Velez

## 2017-06-22 ENCOUNTER — ANTICOAGULATION VISIT (OUTPATIENT)
Dept: CARDIAC SURGERY | Facility: CLINIC | Age: 82
End: 2017-06-22

## 2017-06-22 VITALS — DIASTOLIC BLOOD PRESSURE: 78 MMHG | HEART RATE: 64 BPM | SYSTOLIC BLOOD PRESSURE: 162 MMHG

## 2017-06-22 DIAGNOSIS — I48.91 ATRIAL FIBRILLATION, UNSPECIFIED TYPE (HCC): ICD-10-CM

## 2017-06-22 DIAGNOSIS — Z79.01 LONG-TERM (CURRENT) USE OF ANTICOAGULANTS: ICD-10-CM

## 2017-06-22 LAB — INR PPP: 2.1 (ref 0.9–1.1)

## 2017-06-22 PROCEDURE — 85610 PROTHROMBIN TIME: CPT | Performed by: NURSE PRACTITIONER

## 2017-06-22 NOTE — PROGRESS NOTES
PT states compliant c tx. PT denies any new medications or bleeding issues. PT denies any s/s of blood clot. PT instructed to continue same dose and Coumadin friendly diet. PT will be seen in 2 weeks. Patient instructed regarding medication; results given and questions answered. Nutritional counseling given.  Dietary factors affecting therapy addressed.  Patient instructed to monitor for excessive bruising or bleeding. PT verbalizes understanding.           This document has been electronically signed by LIZET eBy on June 22, 2017 1:19 PM

## 2017-07-06 ENCOUNTER — ANTICOAGULATION VISIT (OUTPATIENT)
Dept: CARDIAC SURGERY | Facility: CLINIC | Age: 82
End: 2017-07-06

## 2017-07-06 VITALS — HEART RATE: 68 BPM | DIASTOLIC BLOOD PRESSURE: 60 MMHG | SYSTOLIC BLOOD PRESSURE: 123 MMHG

## 2017-07-06 DIAGNOSIS — I48.91 ATRIAL FIBRILLATION, UNSPECIFIED TYPE (HCC): ICD-10-CM

## 2017-07-06 DIAGNOSIS — Z79.01 LONG-TERM (CURRENT) USE OF ANTICOAGULANTS: ICD-10-CM

## 2017-07-06 LAB — INR PPP: 2.7 (ref 0.9–1.1)

## 2017-07-06 PROCEDURE — 85610 PROTHROMBIN TIME: CPT | Performed by: NURSE PRACTITIONER

## 2017-07-06 NOTE — PROGRESS NOTES
Pt denies med changes or bleeding problems. Patient instructed regarding medication; results given and questions answered. Nutritional counseling given.  Dietary factors affecting therapy addressed.  Patient instructed to monitor for excessive bruising or bleeding. Will recheck in 3 weeks.  Electronically signed by LIZET Velez

## 2017-07-20 ENCOUNTER — OFFICE VISIT (OUTPATIENT)
Dept: CARDIOLOGY | Facility: CLINIC | Age: 82
End: 2017-07-20

## 2017-07-20 VITALS
DIASTOLIC BLOOD PRESSURE: 54 MMHG | WEIGHT: 172 LBS | HEIGHT: 69 IN | HEART RATE: 57 BPM | BODY MASS INDEX: 25.48 KG/M2 | SYSTOLIC BLOOD PRESSURE: 116 MMHG

## 2017-07-20 DIAGNOSIS — R06.02 SOB (SHORTNESS OF BREATH): ICD-10-CM

## 2017-07-20 DIAGNOSIS — I48.91 ATRIAL FIBRILLATION, UNSPECIFIED TYPE (HCC): Primary | ICD-10-CM

## 2017-07-20 DIAGNOSIS — I48.0 PAROXYSMAL ATRIAL FIBRILLATION (HCC): ICD-10-CM

## 2017-07-20 DIAGNOSIS — I35.9 AORTIC VALVE DISORDER: ICD-10-CM

## 2017-07-20 PROCEDURE — 93000 ELECTROCARDIOGRAM COMPLETE: CPT | Performed by: INTERNAL MEDICINE

## 2017-07-20 PROCEDURE — 99213 OFFICE O/P EST LOW 20 MIN: CPT | Performed by: INTERNAL MEDICINE

## 2017-07-20 NOTE — PROGRESS NOTES
Seth Montiel  81 y.o. male    07/20/2017  1. Atrial fibrillation, unspecified type    2. Paroxysmal atrial fibrillation    3. SOB (shortness of breath)    4. Aortic valve disorder        History of Present Illness     is here for follow-up of his above stated problems.  He denied any chest pain but does have chronic NYHA class II to class III dyspnea on exertion which is unchanged from before.  He uses home oxygen at night and inhaler several times during the day.  Clinically his prosthetic valve is functioning well.  He has remained in sinus rhythm and EKG confirmed sinus bradycardia with a heart rate of 57 bpm.  QTc interval was 4:30 milliseconds.  Nonspecific T-wave changes and poor R wave progression in leads V1 to V3 were noted.  No signs of congestive heart failure was noted.  He has tolerated flecainide quite well.        SUBJECTIVE    No Known Allergies      Past Medical History:   Diagnosis Date   • Aortic valve disorder    • Atrial fibrillation    • COPD (chronic obstructive pulmonary disease)    • Hyperlipidemia    • Hypertension    • Nonrheumatic aortic valve disorder, unspecified    • SOB (shortness of breath)          Past Surgical History:   Procedure Laterality Date   • AORTIC VALVE SURGERY     • APPENDECTOMY     • CARDIAC SURGERY     • GALLBLADDER SURGERY     • ROTATOR CUFF REPAIR           No family history on file.      Social History     Social History   • Marital status:      Spouse name: N/A   • Number of children: N/A   • Years of education: N/A     Occupational History   • Not on file.     Social History Main Topics   • Smoking status: Former Smoker   • Smokeless tobacco: Never Used   • Alcohol use Yes      Comment: social   • Drug use: No   • Sexual activity: Defer     Other Topics Concern   • Not on file     Social History Narrative         Current Outpatient Prescriptions   Medication Sig Dispense Refill   • aspirin 81 MG EC tablet Take 81 mg by mouth daily.    "  • Cholecalciferol (VITAMIN D3) 5000 UNITS capsule capsule Take 5,000 Units by mouth daily.     • diltiazem CD (CARDIZEM CD) 240 MG 24 hr capsule Take 240 mg by mouth daily.     • ferrous sulfate 325 (65 FE) MG tablet Take 325 mg by mouth 2 (two) times a day.     • flecainide (TAMBOCOR) 50 MG tablet TAKE ONE TABLET BY MOUTH EVERY 12 HOURS 60 tablet 6   • furosemide (LASIX) 40 MG tablet Take 40 mg by mouth 2 (two) times a day.     • levothyroxine (SYNTHROID, LEVOTHROID) 50 MCG tablet Take 50 mcg by mouth daily.     • potassium chloride (K-DUR,KLOR-CON) 20 MEQ CR tablet Take 20 mEq by mouth daily.     • Specialty Vitamins Products ([facility] EYE HEALTH FORMULA) capsule Take 1 capsule by mouth Daily.     • warfarin (COUMADIN) 4 MG tablet Take 4 mg by mouth daily. As directed per [facility]       No current facility-administered medications for this visit.          OBJECTIVE    /54  Pulse 57  Ht 69\" (175.3 cm)  Wt 172 lb (78 kg)  BMI 25.4 kg/m2        Review of Systems     Constitutional:  Denies recent weight loss, weight gain, fever or chills     HENT:  Denies any hearing loss, epistaxis, hoarseness, or difficulty speaking.     Eyes: Wears eyeglasses or contact lenses     Respiratory:  Dyspnea with exertion, no cough, wheezing, or hemoptysis.     Cardiovascular: Negative for palpations, chest pain, orthopnea, PND, peripheral edema, syncope, or claudication.     Gastrointestinal:  Denies change in bowel habits, dyspepsia, ulcer disease, hematochezia, or melena.     Endocrine: Negative for cold intolerance, heat intolerance, polydipsia, polyphagia and polyuria. Denies any history of weight change, heat/cold intolerance, polydipsia, polyuria     Genitourinary: Negative.      Musculoskeletal: DJD    Skin:  Denies any change in hair or nails, rashes, or skin lesions.     Allergic/Immunologic: Negative.  Negative for environmental allergies, food allergies and immunocompromised state.     Neurological:  Denies any " history of recurrent headaches, strokes, TIA, or seizure disorder.     Hematological: Denies any food allergies, seasonal allergies, bleeding disorders, or lymphadenopathy.     Psychiatric/Behavioral: Denies any history of depression, substance abuse, or change in cognitive function.         Physical Exam     Constitutional: Cooperative, alert and oriented,  in no acute distress.     HENT:   Head: Normocephalic, normal hair patterns, no masses or tenderness.  Ears, Nose, and Throat: No gross abnormalities. No pallor or cyanosis.  Eyes: EOMS intact, PERRL, conjunctivae and lids unremarkable. Fundoscopic exam and visual fields not performed.   Neck: No palpable masses or adenopathy, no thyromegaly, no JVD, carotid pulses are full and equal bilaterally and without  Bruits.     Cardiovascular: Regular rhythm, S1 and S2 normal, no S3 or S4. 2/6 systolic murmurs, No gallops, or rubs detected.     Pulmonary/Chest: Chest: normal symmetry, no tenderness to palpation,  no intercostal retraction, no use of accessory muscles.            Pulmonary: Normal breath sounds. No rales or ronchi.    Abdominal: Abdomen soft, bowel sounds normoactive, no masses, no hepatosplenomegaly, non-tender, no bruits.     Musculoskeletal: No deformities, clubbing, cyanosis, erythema, or edema observed.    Neurological: No gross motor or sensory deficits noted, affect appropriate, oriented to time, person, place.     Skin: Warm and dry to the touch, no apparent skin lesions or masses noted.     Psychiatric: He has a normal mood and affect. His behavior is normal. Judgment and thought content normal.           ECG 12 Lead  Date/Time: 7/20/2017 4:35 PM  Performed by: CHIVO ANAYA  Authorized by: CHIVO ANAYA   Comparison: not compared with previous ECG   Rhythm: sinus rhythm  Rate: normal  QRS axis: normal  Comments: EKG showed sinus rhythm heart rate of 57 bpm.  QTc interval was 430 milliseconds.  Poor R wave progression in  leads V1 to V3.  Nonspecific T-wave changes.              Lab Results   Component Value Date    WBC 6.6 07/19/2016    HGB 9.6 (L) 07/19/2016    HCT 29.7 (L) 07/19/2016    MCV 92.5 07/19/2016     07/19/2016     Lab Results   Component Value Date    GLUCOSE 76 07/19/2016    BUN 27 (H) 07/19/2016    CREATININE 1.3 07/19/2016    CO2 43 (H) 07/19/2016    CALCIUM 7.8 (L) 07/19/2016    ALBUMIN 2.7 (L) 07/17/2016    AST 44 07/17/2016    ALT 55 07/17/2016     No results found for: CHOL  No results found for: TRIG  No results found for: HDL  No results found for: LDLCALC  No results found for: LDL  No results found for: HDLLDLRATIO  No components found for: CHOLHDL  No results found for: HGBA1C  Lab Results   Component Value Date    TSH 3.75 04/22/2015           ASSESSMENT AND PLAN  Mr. Montiel is compensated with no evidence of congestive heart failure.  His aortic prosthetic valve is functioning well.  He remains in sinus rhythm.  Antiplatelet therapy with aspirin, anticoagulation with warfarin, antiarrhythmic therapy with flecainide and diltiazem CD, Lasix and potassium supplements have been continued.    Diagnoses and all orders for this visit:    Atrial fibrillation, unspecified type    Paroxysmal atrial fibrillation    SOB (shortness of breath)    Aortic valve disorder        Wu Mcarthur MD  7/20/2017  4:32 PM

## 2017-07-27 ENCOUNTER — ANTICOAGULATION VISIT (OUTPATIENT)
Dept: CARDIAC SURGERY | Facility: CLINIC | Age: 82
End: 2017-07-27

## 2017-07-27 VITALS — SYSTOLIC BLOOD PRESSURE: 165 MMHG | HEART RATE: 80 BPM | DIASTOLIC BLOOD PRESSURE: 69 MMHG

## 2017-07-27 DIAGNOSIS — Z79.01 LONG-TERM (CURRENT) USE OF ANTICOAGULANTS: ICD-10-CM

## 2017-07-27 DIAGNOSIS — I48.91 ATRIAL FIBRILLATION, UNSPECIFIED TYPE (HCC): ICD-10-CM

## 2017-07-27 LAB — INR PPP: 1.8 (ref 0.9–1.1)

## 2017-07-27 PROCEDURE — 85610 PROTHROMBIN TIME: CPT | Performed by: NURSE PRACTITIONER

## 2017-07-27 PROCEDURE — 99211 OFF/OP EST MAY X REQ PHY/QHP: CPT | Performed by: NURSE PRACTITIONER
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
## 2017-08-10 ENCOUNTER — ANTICOAGULATION VISIT (OUTPATIENT)
Dept: CARDIAC SURGERY | Facility: CLINIC | Age: 82
End: 2017-08-10

## 2017-08-10 VITALS — SYSTOLIC BLOOD PRESSURE: 130 MMHG | DIASTOLIC BLOOD PRESSURE: 58 MMHG | HEART RATE: 68 BPM

## 2017-08-10 DIAGNOSIS — I48.91 ATRIAL FIBRILLATION, UNSPECIFIED TYPE (HCC): ICD-10-CM

## 2017-08-10 DIAGNOSIS — Z79.01 LONG-TERM (CURRENT) USE OF ANTICOAGULANTS: ICD-10-CM

## 2017-08-10 LAB — INR PPP: 2.1 (ref 0.9–1.1)

## 2017-08-10 PROCEDURE — 85610 PROTHROMBIN TIME: CPT | Performed by: NURSE PRACTITIONER

## 2017-08-10 NOTE — PROGRESS NOTES
Patient states no med changes or bleeding problems or unexplained bruising. Patient instructed to continue current dosing schedule. Verbalizes understanding. Will recheck in 3 weeks. Patient instructed regarding medication; results given and questions answered. Nutritional counseling given.  Dietary factors affecting therapy addressed.  Patient instructed to monitor for excessive bruising or bleeding.           This document has been electronically signed by LIZET Bey on August 10, 2017 4:09 PM

## 2017-08-31 ENCOUNTER — ANTICOAGULATION VISIT (OUTPATIENT)
Dept: CARDIAC SURGERY | Facility: CLINIC | Age: 82
End: 2017-08-31

## 2017-08-31 VITALS — SYSTOLIC BLOOD PRESSURE: 104 MMHG | DIASTOLIC BLOOD PRESSURE: 57 MMHG | HEART RATE: 80 BPM

## 2017-08-31 DIAGNOSIS — Z79.01 LONG-TERM (CURRENT) USE OF ANTICOAGULANTS: ICD-10-CM

## 2017-08-31 DIAGNOSIS — I48.91 ATRIAL FIBRILLATION, UNSPECIFIED TYPE (HCC): ICD-10-CM

## 2017-08-31 LAB — INR PPP: 2.1 (ref 0.9–1.1)

## 2017-08-31 PROCEDURE — 85610 PROTHROMBIN TIME: CPT | Performed by: NURSE PRACTITIONER

## 2017-09-28 ENCOUNTER — ANTICOAGULATION VISIT (OUTPATIENT)
Dept: CARDIAC SURGERY | Facility: CLINIC | Age: 82
End: 2017-09-28

## 2017-09-28 VITALS — HEART RATE: 59 BPM | DIASTOLIC BLOOD PRESSURE: 64 MMHG | SYSTOLIC BLOOD PRESSURE: 129 MMHG

## 2017-09-28 DIAGNOSIS — I48.91 ATRIAL FIBRILLATION, UNSPECIFIED TYPE (HCC): ICD-10-CM

## 2017-09-28 DIAGNOSIS — Z79.01 LONG-TERM (CURRENT) USE OF ANTICOAGULANTS: ICD-10-CM

## 2017-09-28 LAB — INR PPP: 3 (ref 0.9–1.1)

## 2017-09-28 PROCEDURE — 85610 PROTHROMBIN TIME: CPT | Performed by: NURSE PRACTITIONER

## 2017-09-28 NOTE — PROGRESS NOTES
PT states compliant c tx. Denies any new medications or bleeding issues. PT denies any s/s of blood clot. PT instructed to continue same dose and Coumadin friendly diet. PT will be seen in 1 month. Patient instructed regarding medication; results given and questions answered. Nutritional counseling given.  Dietary factors affecting therapy addressed.  Patient instructed to monitor for excessive bruising or bleeding. PT verbalizes understanding.         This document has been electronically signed by LIZET Bey on September 28, 2017 8:29 AM

## 2017-10-30 ENCOUNTER — ANTICOAGULATION VISIT (OUTPATIENT)
Dept: CARDIAC SURGERY | Facility: CLINIC | Age: 82
End: 2017-10-30

## 2017-10-30 VITALS — SYSTOLIC BLOOD PRESSURE: 130 MMHG | HEART RATE: 60 BPM | DIASTOLIC BLOOD PRESSURE: 52 MMHG

## 2017-10-30 DIAGNOSIS — I48.91 ATRIAL FIBRILLATION, UNSPECIFIED TYPE (HCC): ICD-10-CM

## 2017-10-30 DIAGNOSIS — Z79.01 LONG-TERM (CURRENT) USE OF ANTICOAGULANTS: ICD-10-CM

## 2017-10-30 LAB — INR PPP: 3.6 (ref 0.9–1.1)

## 2017-10-30 PROCEDURE — 85610 PROTHROMBIN TIME: CPT | Performed by: NURSE PRACTITIONER

## 2017-10-30 PROCEDURE — 99211 OFF/OP EST MAY X REQ PHY/QHP: CPT | Performed by: NURSE PRACTITIONER

## 2017-10-30 NOTE — PROGRESS NOTES
Pt states no changes to meds or diet.  No bleeding issues.  Adjusted coumadin dose and instructed pt to increase Vit K intake today with a broccoli serving.  Recheck INR next Monday.  Pt verbalizes.  Patient instructed regarding medication; results given and questions answered. Nutritional counseling given.  Dietary factors affecting therapy addressed.  Patient instructed to monitor for excessive bruising or bleeding.          This document has been electronically signed by LIZET Bey on October 31, 2017 10:16 AM

## 2017-11-07 ENCOUNTER — ANTICOAGULATION VISIT (OUTPATIENT)
Dept: CARDIAC SURGERY | Facility: CLINIC | Age: 82
End: 2017-11-07

## 2017-11-07 VITALS — SYSTOLIC BLOOD PRESSURE: 120 MMHG | DIASTOLIC BLOOD PRESSURE: 50 MMHG | HEART RATE: 64 BPM

## 2017-11-07 DIAGNOSIS — I48.91 ATRIAL FIBRILLATION, UNSPECIFIED TYPE (HCC): ICD-10-CM

## 2017-11-07 DIAGNOSIS — Z79.01 LONG-TERM (CURRENT) USE OF ANTICOAGULANTS: ICD-10-CM

## 2017-11-07 LAB — INR PPP: 2.2 (ref 0.9–1.1)

## 2017-11-07 PROCEDURE — 85610 PROTHROMBIN TIME: CPT | Performed by: NURSE PRACTITIONER

## 2017-11-07 PROCEDURE — 99211 OFF/OP EST MAY X REQ PHY/QHP: CPT | Performed by: NURSE PRACTITIONER

## 2017-11-07 NOTE — PROGRESS NOTES
Pt states no changes to meds or diet.  No bleeding issues.  INR did lower significantly from last week.  Adjusted coumadin dose slightly.  Pt requests a two week appt.  Patient instructed regarding medication; results given and questions answered. Nutritional counseling given.  Dietary factors affecting therapy addressed.  Patient instructed to monitor for excessive bruising or bleeding.  Electronically signed by LIZET Velez

## 2017-11-20 ENCOUNTER — ANTICOAGULATION VISIT (OUTPATIENT)
Dept: CARDIAC SURGERY | Facility: CLINIC | Age: 82
End: 2017-11-20

## 2017-11-20 VITALS — SYSTOLIC BLOOD PRESSURE: 112 MMHG | DIASTOLIC BLOOD PRESSURE: 52 MMHG | HEART RATE: 60 BPM

## 2017-11-20 DIAGNOSIS — Z79.01 LONG-TERM (CURRENT) USE OF ANTICOAGULANTS: ICD-10-CM

## 2017-11-20 DIAGNOSIS — I48.91 ATRIAL FIBRILLATION, UNSPECIFIED TYPE (HCC): ICD-10-CM

## 2017-11-20 LAB — INR PPP: 2.6 (ref 0.9–1.1)

## 2017-11-20 PROCEDURE — 85610 PROTHROMBIN TIME: CPT | Performed by: NURSE PRACTITIONER

## 2017-11-20 NOTE — PROGRESS NOTES
.Patient states no med changes or bleeding problems or unexplained bruising. Patient instructed to continue current dosing schedule. Verbalizes understanding. Will recheck 1 month.   Patient instructed regarding medication; results given and questions answered. Nutritional counseling given.  Dietary factors affecting therapy addressed.  Patient instructed to monitor for excessive bruising or bleeding.  Electronically signed by LIZET Velez

## 2017-12-19 ENCOUNTER — DOCUMENTATION (OUTPATIENT)
Dept: CARDIAC SURGERY | Facility: CLINIC | Age: 82
End: 2017-12-19

## 2017-12-20 ENCOUNTER — ANTICOAGULATION VISIT (OUTPATIENT)
Dept: CARDIAC SURGERY | Facility: CLINIC | Age: 82
End: 2017-12-20

## 2017-12-20 VITALS — HEART RATE: 68 BPM | SYSTOLIC BLOOD PRESSURE: 131 MMHG | DIASTOLIC BLOOD PRESSURE: 63 MMHG

## 2017-12-20 DIAGNOSIS — I48.91 ATRIAL FIBRILLATION, UNSPECIFIED TYPE (HCC): ICD-10-CM

## 2017-12-20 DIAGNOSIS — Z79.01 LONG-TERM (CURRENT) USE OF ANTICOAGULANTS: ICD-10-CM

## 2017-12-20 LAB — INR PPP: 2.2 (ref 0.9–1.1)

## 2017-12-20 PROCEDURE — 85610 PROTHROMBIN TIME: CPT | Performed by: NURSE PRACTITIONER

## 2018-01-01 ENCOUNTER — ANTICOAGULATION VISIT (OUTPATIENT)
Dept: CARDIAC SURGERY | Facility: CLINIC | Age: 83
End: 2018-01-01

## 2018-01-01 ENCOUNTER — OFFICE VISIT (OUTPATIENT)
Dept: CARDIOLOGY | Facility: CLINIC | Age: 83
End: 2018-01-01

## 2018-01-01 ENCOUNTER — DOCUMENTATION (OUTPATIENT)
Dept: CARDIAC SURGERY | Facility: CLINIC | Age: 83
End: 2018-01-01

## 2018-01-01 VITALS — DIASTOLIC BLOOD PRESSURE: 54 MMHG | OXYGEN SATURATION: 96 % | HEART RATE: 75 BPM | SYSTOLIC BLOOD PRESSURE: 112 MMHG

## 2018-01-01 VITALS
BODY MASS INDEX: 25.48 KG/M2 | HEIGHT: 69 IN | SYSTOLIC BLOOD PRESSURE: 133 MMHG | HEART RATE: 64 BPM | DIASTOLIC BLOOD PRESSURE: 55 MMHG | OXYGEN SATURATION: 95 % | WEIGHT: 172 LBS

## 2018-01-01 VITALS — HEART RATE: 84 BPM | SYSTOLIC BLOOD PRESSURE: 124 MMHG | DIASTOLIC BLOOD PRESSURE: 50 MMHG

## 2018-01-01 VITALS
SYSTOLIC BLOOD PRESSURE: 122 MMHG | DIASTOLIC BLOOD PRESSURE: 57 MMHG | HEART RATE: 72 BPM | SYSTOLIC BLOOD PRESSURE: 132 MMHG | DIASTOLIC BLOOD PRESSURE: 50 MMHG | HEART RATE: 78 BPM | OXYGEN SATURATION: 92 % | DIASTOLIC BLOOD PRESSURE: 72 MMHG | HEART RATE: 70 BPM | HEART RATE: 72 BPM | DIASTOLIC BLOOD PRESSURE: 54 MMHG | SYSTOLIC BLOOD PRESSURE: 130 MMHG | SYSTOLIC BLOOD PRESSURE: 118 MMHG

## 2018-01-01 VITALS
DIASTOLIC BLOOD PRESSURE: 54 MMHG | SYSTOLIC BLOOD PRESSURE: 120 MMHG | HEART RATE: 84 BPM | DIASTOLIC BLOOD PRESSURE: 50 MMHG | HEART RATE: 68 BPM | SYSTOLIC BLOOD PRESSURE: 120 MMHG

## 2018-01-01 VITALS — HEART RATE: 84 BPM | SYSTOLIC BLOOD PRESSURE: 108 MMHG | DIASTOLIC BLOOD PRESSURE: 59 MMHG | OXYGEN SATURATION: 96 %

## 2018-01-01 VITALS — HEART RATE: 73 BPM | DIASTOLIC BLOOD PRESSURE: 57 MMHG | OXYGEN SATURATION: 90 % | SYSTOLIC BLOOD PRESSURE: 128 MMHG

## 2018-01-01 VITALS — SYSTOLIC BLOOD PRESSURE: 130 MMHG | HEART RATE: 68 BPM | DIASTOLIC BLOOD PRESSURE: 50 MMHG

## 2018-01-01 VITALS — SYSTOLIC BLOOD PRESSURE: 116 MMHG | HEART RATE: 76 BPM | DIASTOLIC BLOOD PRESSURE: 52 MMHG

## 2018-01-01 VITALS — SYSTOLIC BLOOD PRESSURE: 121 MMHG | DIASTOLIC BLOOD PRESSURE: 56 MMHG | HEART RATE: 75 BPM | OXYGEN SATURATION: 96 %

## 2018-01-01 VITALS — SYSTOLIC BLOOD PRESSURE: 141 MMHG | DIASTOLIC BLOOD PRESSURE: 60 MMHG | OXYGEN SATURATION: 95 % | HEART RATE: 70 BPM

## 2018-01-01 VITALS — SYSTOLIC BLOOD PRESSURE: 124 MMHG | HEART RATE: 76 BPM | DIASTOLIC BLOOD PRESSURE: 50 MMHG

## 2018-01-01 VITALS — HEART RATE: 72 BPM | SYSTOLIC BLOOD PRESSURE: 130 MMHG | DIASTOLIC BLOOD PRESSURE: 54 MMHG

## 2018-01-01 VITALS — HEART RATE: 64 BPM

## 2018-01-01 DIAGNOSIS — Z79.01 LONG-TERM (CURRENT) USE OF ANTICOAGULANTS: ICD-10-CM

## 2018-01-01 DIAGNOSIS — I48.91 ATRIAL FIBRILLATION, UNSPECIFIED TYPE (HCC): ICD-10-CM

## 2018-01-01 DIAGNOSIS — I35.9 AORTIC VALVE DISORDER: ICD-10-CM

## 2018-01-01 DIAGNOSIS — Z79.01 LONG TERM CURRENT USE OF ANTICOAGULANT THERAPY: ICD-10-CM

## 2018-01-01 DIAGNOSIS — I35.9 NONRHEUMATIC AORTIC VALVE DISORDER, UNSPECIFIED: Primary | ICD-10-CM

## 2018-01-01 LAB
INR PPP: 2.2 (ref 0.9–1.1)
INR PPP: 2.5 (ref 0.9–1.1)
INR PPP: 2.6 (ref 0.9–1.1)
INR PPP: 2.6 (ref 0.9–1.1)
INR PPP: 3 (ref 0.9–1.1)
INR PPP: 3 (ref 0.9–1.1)
INR PPP: 3.1 (ref 0.9–1.1)
INR PPP: 3.2 (ref 0.9–1.1)
INR PPP: 3.3 (ref 0.9–1.1)
INR PPP: 3.5 (ref 0.9–1.1)
INR PPP: 3.7 (ref 0.9–1.1)
INR PPP: 3.8 (ref 0.9–1.1)
INR PPP: 4 (ref 0.9–1.1)

## 2018-01-01 PROCEDURE — 85610 PROTHROMBIN TIME: CPT | Performed by: NURSE PRACTITIONER

## 2018-01-01 PROCEDURE — 99211 OFF/OP EST MAY X REQ PHY/QHP: CPT | Performed by: NURSE PRACTITIONER

## 2018-01-01 PROCEDURE — 99214 OFFICE O/P EST MOD 30 MIN: CPT | Performed by: INTERNAL MEDICINE

## 2018-01-01 RX ORDER — SULFAMETHOXAZOLE AND TRIMETHOPRIM 800; 160 MG/1; MG/1
1 TABLET ORAL 2 TIMES DAILY
COMMUNITY
End: 2019-01-01 | Stop reason: HOSPADM

## 2018-01-01 RX ORDER — SULFAMETHOXAZOLE AND TRIMETHOPRIM 400; 80 MG/1; MG/1
1 TABLET ORAL 2 TIMES DAILY
COMMUNITY
End: 2018-01-01

## 2018-01-17 ENCOUNTER — OFFICE VISIT (OUTPATIENT)
Dept: CARDIOLOGY | Facility: CLINIC | Age: 83
End: 2018-01-17

## 2018-01-17 ENCOUNTER — ANTICOAGULATION VISIT (OUTPATIENT)
Dept: CARDIAC SURGERY | Facility: CLINIC | Age: 83
End: 2018-01-17

## 2018-01-17 VITALS
SYSTOLIC BLOOD PRESSURE: 115 MMHG | WEIGHT: 172 LBS | DIASTOLIC BLOOD PRESSURE: 58 MMHG | BODY MASS INDEX: 25.48 KG/M2 | HEIGHT: 69 IN | HEART RATE: 72 BPM

## 2018-01-17 VITALS — HEART RATE: 72 BPM | SYSTOLIC BLOOD PRESSURE: 115 MMHG | DIASTOLIC BLOOD PRESSURE: 56 MMHG

## 2018-01-17 DIAGNOSIS — R06.02 SOB (SHORTNESS OF BREATH): ICD-10-CM

## 2018-01-17 DIAGNOSIS — I48.0 PAROXYSMAL ATRIAL FIBRILLATION (HCC): ICD-10-CM

## 2018-01-17 DIAGNOSIS — I35.9 AORTIC VALVE DISORDER: Primary | ICD-10-CM

## 2018-01-17 DIAGNOSIS — Z79.01 LONG-TERM (CURRENT) USE OF ANTICOAGULANTS: ICD-10-CM

## 2018-01-17 DIAGNOSIS — I48.91 ATRIAL FIBRILLATION, UNSPECIFIED TYPE (HCC): ICD-10-CM

## 2018-01-17 LAB — INR PPP: 2.2 (ref 0.9–1.1)

## 2018-01-17 PROCEDURE — 99214 OFFICE O/P EST MOD 30 MIN: CPT | Performed by: INTERNAL MEDICINE

## 2018-01-17 PROCEDURE — 85610 PROTHROMBIN TIME: CPT | Performed by: NURSE PRACTITIONER

## 2018-01-17 RX ORDER — ATORVASTATIN CALCIUM 10 MG/1
10 TABLET, FILM COATED ORAL NIGHTLY
COMMUNITY

## 2018-01-17 NOTE — PROGRESS NOTES
Seth Montiel  82 y.o. male    01/17/2018  1. Aortic valve disorder    2. Paroxysmal atrial fibrillation    3. SOB (shortness of breath)    4. Long-term (current) use of anticoagulants        History of Present Illness    Mr. Montiel is here for follow-up of his above stated problems.  His present complaint is long-standing dyspnea which is really unchanged from previous visits.  His heart rate and blood pressure in the normal range.  His been compliant with his medications including anticoagulation with Coumadin and his INR today was 2.2.  He has an aortic bioprosthetic valve and echocardiogram 1 year ago showed a mean gradient of 25 mmHg.  He did have bronchospasm due to COPD and uses inhalers/ nebulizers on a regular basis and is on home oxygen.        SUBJECTIVE    No Known Allergies      Past Medical History:   Diagnosis Date   • Aortic valve disorder    • Atrial fibrillation    • COPD (chronic obstructive pulmonary disease)    • Hyperlipidemia    • Hypertension    • Nonrheumatic aortic valve disorder, unspecified    • SOB (shortness of breath)          Past Surgical History:   Procedure Laterality Date   • AORTIC VALVE SURGERY     • APPENDECTOMY     • CARDIAC SURGERY     • GALLBLADDER SURGERY     • ROTATOR CUFF REPAIR           Family History   Problem Relation Age of Onset   • No Known Problems Mother    • No Known Problems Father          Social History     Social History   • Marital status:      Spouse name: N/A   • Number of children: N/A   • Years of education: N/A     Occupational History   • Not on file.     Social History Main Topics   • Smoking status: Former Smoker   • Smokeless tobacco: Never Used   • Alcohol use Yes      Comment: social   • Drug use: No   • Sexual activity: Defer     Other Topics Concern   • Not on file     Social History Narrative         Current Outpatient Prescriptions   Medication Sig Dispense Refill   • aspirin 81 MG EC tablet Take 81 mg by mouth daily.     •  "atorvastatin (LIPITOR) 10 MG tablet Take 10 mg by mouth Every Night.     • Cholecalciferol (VITAMIN D3) 5000 UNITS capsule capsule Take 5,000 Units by mouth daily.     • diltiazem CD (CARDIZEM CD) 240 MG 24 hr capsule Take 240 mg by mouth daily.     • ferrous sulfate 325 (65 FE) MG tablet Take 325 mg by mouth 2 (two) times a day.     • flecainide (TAMBOCOR) 50 MG tablet TAKE ONE TABLET BY MOUTH EVERY 12 HOURS 60 tablet 6   • furosemide (LASIX) 40 MG tablet Take 40 mg by mouth 2 (two) times a day.     • levothyroxine (SYNTHROID, LEVOTHROID) 50 MCG tablet Take 50 mcg by mouth daily.     • Specialty Vitamins Products ([facility] EYE HEALTH FORMULA) capsule Take 1 capsule by mouth Daily.     • warfarin (COUMADIN) 4 MG tablet Take 4 mg by mouth daily. As directed per [facility]       No current facility-administered medications for this visit.          OBJECTIVE    /58  Pulse 72  Ht 175.3 cm (69.02\")  Wt 78 kg (172 lb)  BMI 25.39 kg/m2        Review of Systems     Constitutional:  Denies recent weight loss, weight gain, fever or chills     HENT:  Denies any hearing loss, epistaxis, hoarseness, or difficulty speaking.     Eyes: Wears eyeglasses or contact lenses     Respiratory:  Dyspnea with exertion, cough, wheezing, No hemoptysis.     Cardiovascular: Negative for palpations, chest pain    Gastrointestinal:  Denies change in bowel habits, dyspepsia, ulcer disease, hematochezia, or melena.     Endocrine: Negative for cold intolerance, heat intolerance, polydipsia, polyphagia and polyuria.     Genitourinary: Negative.      Musculoskeletal: DJD    Skin:  Denies any change in hair or nails, rashes, or skin lesions.       Physical Exam     Constitutional: Cooperative, alert and oriented, in no acute distress.  chronically ill-appearing    HENT:   Head: Normocephalic, normal hair patterns, no masses or tenderness.  Ears, Nose, and Throat: No gross abnormalities. No pallor or cyanosis.   Eyes: EOMS intact, PERRL, " conjunctivae and lids unremarkable. Fundoscopic exam and visual fields not performed.   Neck: No palpable masses or adenopathy, no thyromegaly, no JVD, carotid pulses are full and equal bilaterally and without  Bruits.     Cardiovascular: Regular rhythm, S1 and S2 normal, no S3 or S4. 3/6 systolic murmur, No gallops, or rubs detected.     Pulmonary/Chest: Chest: Increased AP diameter of the chest , normal respiratory excursion, no intercostal retraction, no use of accessory muscles.            Pulmonary: Normal breath sounds.  Few scattered bilateral rhonchi    Abdominal: Abdomen soft, bowel sounds normoactive, no masses, no hepatosplenomegaly, non-tender, no bruits.     Musculoskeletal: No deformities, clubbing, cyanosis, erythema, or edema observed. There are no spinal abnormalities noted.     Neurological: No gross motor or sensory deficits noted, affect appropriate, oriented to time, person, place.     Skin: Warm and dry to the touch, no apparent skin lesions or masses noted.     Psychiatric: He has a normal mood and affect. His behavior is normal. Judgment and thought content normal.         Procedures      Lab Results   Component Value Date    WBC 6.6 07/19/2016    HGB 9.6 (L) 07/19/2016    HCT 29.7 (L) 07/19/2016    MCV 92.5 07/19/2016     07/19/2016     Lab Results   Component Value Date    GLUCOSE 76 07/19/2016    BUN 27 (H) 07/19/2016    CREATININE 1.3 07/19/2016    CO2 43 (H) 07/19/2016    CALCIUM 7.8 (L) 07/19/2016    ALBUMIN 2.7 (L) 07/17/2016    AST 44 07/17/2016    ALT 55 07/17/2016     No results found for: CHOL  No results found for: TRIG  No results found for: HDL  No results found for: LDLCALC  No results found for: LDL  No results found for: HDLLDLRATIO  No components found for: CHOLHDL  No results found for: HGBA1C  Lab Results   Component Value Date    TSH 3.75 04/22/2015           ASSESSMENT AND PLAN  Mr. Montiel has multiple medical issues as described above and his dyspnea is  multifactorial.  He has long-standing COPD and reassess the prosthetic aortic valve and echo cardiac gram is being arranged.  Antiplatelet therapy with aspirin, antiarrhythmic therapy with flecainide, lipid-lowering therapy with atorvastatin, antihypertensive therapy with Cardizem CD have been continued.  He is on 40 mg of Lasix on a regular basis.  His lab work was done earlier this month and his LDL was noted to be elevated and hence he was started on atorvastatin. Electrolytes are in the normal range.  CBC is within normal limits.  He will be seen again in 6 months.    Seth was seen today for follow-up.    Diagnoses and all orders for this visit:    Aortic valve disorder  -     Adult Transthoracic Echo Complete W/ Cont if Necessary Per Protocol; Future    Paroxysmal atrial fibrillation  -     Adult Transthoracic Echo Complete W/ Cont if Necessary Per Protocol; Future    SOB (shortness of breath)  -     Adult Transthoracic Echo Complete W/ Cont if Necessary Per Protocol; Future    Long-term (current) use of anticoagulants  -     Adult Transthoracic Echo Complete W/ Cont if Necessary Per Protocol; Future        Wu Mcarthur MD  1/17/2018  8:39 AM

## 2018-02-06 ENCOUNTER — DOCUMENTATION (OUTPATIENT)
Dept: CARDIAC SURGERY | Facility: CLINIC | Age: 83
End: 2018-02-06

## 2018-02-06 NOTE — PROGRESS NOTES
Pt called to say he is starting doxycycline today for 10 days for urinary tract infection.  Pt states he had an INR drawn today at [facility] in [place] and it was 4.4; INR verified by [facility].  I instructed pt to hold today's coumadin dose and to increase Vit K intake today with a large broccoli serving.  F/U appt made for tomorrow, 2/07.  Pt verbalizes.  Pt also states he was started on Atorvastatin three weeks ago which shows potential for minor interaction with coumadin.

## 2018-02-07 ENCOUNTER — ANTICOAGULATION VISIT (OUTPATIENT)
Dept: CARDIAC SURGERY | Facility: CLINIC | Age: 83
End: 2018-02-07

## 2018-02-07 VITALS — SYSTOLIC BLOOD PRESSURE: 110 MMHG | HEART RATE: 76 BPM | DIASTOLIC BLOOD PRESSURE: 58 MMHG

## 2018-02-07 DIAGNOSIS — I48.91 ATRIAL FIBRILLATION, UNSPECIFIED TYPE (HCC): ICD-10-CM

## 2018-02-07 DIAGNOSIS — Z79.01 LONG-TERM (CURRENT) USE OF ANTICOAGULANTS: ICD-10-CM

## 2018-02-07 LAB — INR PPP: 3.5 (ref 0.9–1.1)

## 2018-02-07 PROCEDURE — 99211 OFF/OP EST MAY X REQ PHY/QHP: CPT | Performed by: NURSE PRACTITIONER

## 2018-02-07 PROCEDURE — 85610 PROTHROMBIN TIME: CPT | Performed by: NURSE PRACTITIONER

## 2018-02-07 NOTE — PROGRESS NOTES
Pt called yesterday to say he was starting Doxycycline for ten days for UTI.  Pt states he did have trace blood in his urine as well.  Pt held last night's coumadin dose due to elevated INR of 4.4 verified by [facility].  Adjusted coumadin dose and instructed pt to increase Vit K intake today with a large broccoli serving.  Recheck INR next Monday.  Pt verbalizes.  Patient instructed regarding medication; results given and questions answered. Nutritional counseling given.  Dietary factors affecting therapy addressed.  Patient instructed to monitor for excessive bruising or bleeding.  Electronically signed by LIZET Velez

## 2018-02-12 ENCOUNTER — ANTICOAGULATION VISIT (OUTPATIENT)
Dept: CARDIAC SURGERY | Facility: CLINIC | Age: 83
End: 2018-02-12

## 2018-02-12 VITALS — HEART RATE: 60 BPM | DIASTOLIC BLOOD PRESSURE: 54 MMHG | SYSTOLIC BLOOD PRESSURE: 130 MMHG

## 2018-02-12 DIAGNOSIS — Z79.01 LONG-TERM (CURRENT) USE OF ANTICOAGULANTS: ICD-10-CM

## 2018-02-12 DIAGNOSIS — I48.91 ATRIAL FIBRILLATION, UNSPECIFIED TYPE (HCC): ICD-10-CM

## 2018-02-12 LAB — INR PPP: 2.7 (ref 0.9–1.1)

## 2018-02-12 PROCEDURE — 85610 PROTHROMBIN TIME: CPT | Performed by: NURSE PRACTITIONER

## 2018-02-12 NOTE — PROGRESS NOTES
Pt continues Doxycycline through this week for UTI.  Pt states the blood in urine has cleared.  Instructed pt on coumadin dosing schedule and will recheck INR this Thursday.  Instructed pt to increase Vit K intake today and Wednesday.  Pt verbalizes.  Patient instructed regarding medication; results given and questions answered. Nutritional counseling given.  Dietary factors affecting therapy addressed.  Patient instructed to monitor for excessive bruising or bleeding.          This document has been electronically signed by LIZET Bey on February 12, 2018 9:22 AM

## 2018-02-15 ENCOUNTER — ANTICOAGULATION VISIT (OUTPATIENT)
Dept: CARDIAC SURGERY | Facility: CLINIC | Age: 83
End: 2018-02-15

## 2018-02-15 VITALS — DIASTOLIC BLOOD PRESSURE: 64 MMHG | OXYGEN SATURATION: 90 % | SYSTOLIC BLOOD PRESSURE: 120 MMHG | HEART RATE: 77 BPM

## 2018-02-15 DIAGNOSIS — Z79.01 LONG-TERM (CURRENT) USE OF ANTICOAGULANTS: ICD-10-CM

## 2018-02-15 DIAGNOSIS — I48.91 ATRIAL FIBRILLATION, UNSPECIFIED TYPE (HCC): ICD-10-CM

## 2018-02-15 LAB — INR PPP: 2.4 (ref 0.9–1.1)

## 2018-02-15 PROCEDURE — 99211 OFF/OP EST MAY X REQ PHY/QHP: CPT | Performed by: NURSE PRACTITIONER

## 2018-02-15 PROCEDURE — 85610 PROTHROMBIN TIME: CPT | Performed by: NURSE PRACTITIONER

## 2018-02-15 NOTE — PROGRESS NOTES
PT states he finished Doxycyline this am. PT denies any other med changes or s/s of blood clot. PT is still having intermittent blood in urine but is following up with urologist. Due to pt's elevation on previous dose before AB, dose was decreased slightly and pt will be seen next week. Patient instructed regarding medication; results given and questions answered. Nutritional counseling given.  Dietary factors affecting therapy addressed.  Patient instructed to monitor for excessive bruising or bleeding. PT instructed to contact PCP/Urologist or Urgent Care if blood in urine becomes frequent or severe. PT verbalizes understanding.           This document has been electronically signed by LIZET Bey on February 15, 2018 11:57 AM

## 2018-02-16 ENCOUNTER — TELEPHONE (OUTPATIENT)
Dept: CARDIOLOGY | Facility: CLINIC | Age: 83
End: 2018-02-16

## 2018-02-16 RX ORDER — FLECAINIDE ACETATE 50 MG/1
TABLET ORAL
Qty: 60 TABLET | Refills: 7 | Status: SHIPPED | OUTPATIENT
Start: 2018-02-16 | End: 2019-01-01 | Stop reason: SDUPTHER

## 2018-02-23 ENCOUNTER — ANTICOAGULATION VISIT (OUTPATIENT)
Dept: CARDIAC SURGERY | Facility: CLINIC | Age: 83
End: 2018-02-23

## 2018-02-23 VITALS — SYSTOLIC BLOOD PRESSURE: 137 MMHG | HEART RATE: 68 BPM | DIASTOLIC BLOOD PRESSURE: 66 MMHG

## 2018-02-23 DIAGNOSIS — I48.91 ATRIAL FIBRILLATION, UNSPECIFIED TYPE (HCC): ICD-10-CM

## 2018-02-23 DIAGNOSIS — Z79.01 LONG-TERM (CURRENT) USE OF ANTICOAGULANTS: ICD-10-CM

## 2018-02-23 LAB — INR PPP: 2.8 (ref 0.9–1.1)

## 2018-02-23 PROCEDURE — 85610 PROTHROMBIN TIME: CPT | Performed by: NURSE PRACTITIONER

## 2018-02-23 NOTE — PROGRESS NOTES
Pt denies med changes. Pt states he still has a little blood in his urine and is seeing a provider in [place] for the bleeding. Pt was instructed to continue current dose and have a serving of green veggies today; pt verbalized. Patient instructed regarding medication; results given and questions answered. Nutritional counseling given.  Dietary factors affecting therapy addressed.  Patient instructed to monitor for excessive bruising or bleeding. Will recheck next week.  Electronically signed by LIZET Velez

## 2018-03-02 ENCOUNTER — ANTICOAGULATION VISIT (OUTPATIENT)
Dept: CARDIAC SURGERY | Facility: CLINIC | Age: 83
End: 2018-03-02

## 2018-03-02 VITALS — DIASTOLIC BLOOD PRESSURE: 56 MMHG | HEART RATE: 72 BPM | SYSTOLIC BLOOD PRESSURE: 127 MMHG

## 2018-03-02 DIAGNOSIS — I48.91 ATRIAL FIBRILLATION, UNSPECIFIED TYPE (HCC): ICD-10-CM

## 2018-03-02 DIAGNOSIS — Z79.01 LONG-TERM (CURRENT) USE OF ANTICOAGULANTS: ICD-10-CM

## 2018-03-02 LAB — INR PPP: 2.7 (ref 0.9–1.1)

## 2018-03-02 PROCEDURE — 85610 PROTHROMBIN TIME: CPT | Performed by: NURSE PRACTITIONER

## 2018-03-02 NOTE — PROGRESS NOTES
Patient states no med changes or bleeding problems or unexplained bruising. Patient instructed to continue current dosing schedule. Verbalizes understanding. Will recheck in 2 weeks. Patient instructed regarding medication; results given and questions answered. Nutritional counseling given.  Dietary factors affecting therapy addressed.  Patient instructed to monitor for excessive bruising or bleeding.           This document has been electronically signed by LIZET Bey on March 5, 2018 3:35 PM

## 2018-03-12 ENCOUNTER — DOCUMENTATION (OUTPATIENT)
Dept: CARDIAC SURGERY | Facility: CLINIC | Age: 83
End: 2018-03-12

## 2018-03-12 NOTE — PROGRESS NOTES
Pt called to say he started Augmentin for 10 days this past Friday for UTI,(blood in urine).  Instructed pt to only take 2mg of coumadin tonight and recheck INR tomorrow.  Pt verbalized instructions.

## 2018-03-13 ENCOUNTER — ANTICOAGULATION VISIT (OUTPATIENT)
Dept: CARDIAC SURGERY | Facility: CLINIC | Age: 83
End: 2018-03-13

## 2018-03-13 VITALS — SYSTOLIC BLOOD PRESSURE: 159 MMHG | HEART RATE: 68 BPM | DIASTOLIC BLOOD PRESSURE: 76 MMHG

## 2018-03-13 DIAGNOSIS — I48.91 ATRIAL FIBRILLATION, UNSPECIFIED TYPE (HCC): ICD-10-CM

## 2018-03-13 DIAGNOSIS — Z79.01 LONG-TERM (CURRENT) USE OF ANTICOAGULANTS: ICD-10-CM

## 2018-03-13 LAB — INR PPP: 1.9 (ref 0.9–1.1)

## 2018-03-13 PROCEDURE — 85610 PROTHROMBIN TIME: CPT | Performed by: NURSE PRACTITIONER

## 2018-03-13 NOTE — PROGRESS NOTES
Pt is on day 4 of 10 of Augmentin. Pt states he is no longer having blood in his urine and denied other bleeding problems. Pt states he took 4mg of Coumadin last night and ate broccoli. Pt instructed to have a serving of green veggies every other day while on Augmentin; pt verbalized. Patient instructed regarding medication; results given and questions answered. Nutritional counseling given.  Dietary factors affecting therapy addressed.  Patient instructed to monitor for excessive bruising or bleeding. Will recheck in 2 weeks.         This document has been electronically signed by LIZET Bey on March 13, 2018 10:30 AM

## 2018-03-30 ENCOUNTER — ANTICOAGULATION VISIT (OUTPATIENT)
Dept: CARDIAC SURGERY | Facility: CLINIC | Age: 83
End: 2018-03-30

## 2018-03-30 VITALS — DIASTOLIC BLOOD PRESSURE: 69 MMHG | SYSTOLIC BLOOD PRESSURE: 120 MMHG | HEART RATE: 72 BPM

## 2018-03-30 DIAGNOSIS — Z79.01 LONG-TERM (CURRENT) USE OF ANTICOAGULANTS: ICD-10-CM

## 2018-03-30 DIAGNOSIS — I48.91 ATRIAL FIBRILLATION, UNSPECIFIED TYPE (HCC): ICD-10-CM

## 2018-03-30 LAB — INR PPP: 2.5 (ref 0.9–1.1)

## 2018-03-30 PROCEDURE — 85610 PROTHROMBIN TIME: CPT | Performed by: NURSE PRACTITIONER

## 2018-03-30 NOTE — PROGRESS NOTES
Patient states no med changes or bleeding problems or unexplained bruising. Patient instructed to continue current dosing schedule. Verbalizes understanding. Will recheck in 3 weeks. Patient instructed regarding medication; results given and questions answered. Nutritional counseling given.  Dietary factors affecting therapy addressed.  Patient instructed to monitor for excessive bruising or bleeding.           This document has been electronically signed by LIZET Bey on March 30, 2018 12:46 PM

## 2018-04-20 NOTE — PROGRESS NOTES
PT states compliant c tx. PT denies any new medications or bleeding issues. PT denies any s/s of blood clot. PT instructed to continue same dose and Coumadin friendly diet. PT will be seen in 1 month. Patient instructed regarding medication; results given and questions answered. Nutritional counseling given.  Dietary factors affecting therapy addressed.  Patient instructed to monitor for excessive bruising or bleeding. Pt verbalizes understanding.           This document has been electronically signed by LIZET Bey on April 20, 2018 8:49 AM

## 2018-05-23 NOTE — PROGRESS NOTES
Pt denies med changes, no bleeding issues.  Pt states he did consume alcohol lately due to the anniversary of his wife's passing.  Adjusted coumadin dose and instructed pt to increase Vit K intake today with a broccoli or turnip green serving.  Instructed pt to watch for s/s bleeding and report to ER for blunt trauma; pt denies bleeding issues at this time.  Recheck INR tomorrow.  Pt verbalizes.  Patient instructed regarding medication; results given and questions answered. Nutritional counseling given.  Dietary factors affecting therapy addressed.  Patient instructed to monitor for excessive bruising or bleeding.          This document has been electronically signed by LIZET Bey on May 23, 2018 9:46 AM

## 2018-05-24 NOTE — PROGRESS NOTES
Pt here today for recheck of elevated INR r/t alcohol intake.  Pt denies bleeding issues. Adjusted coumadin dose slightly today and instructed pt to increase Vit K intake today with a broccoli serving.  Recheck INR next wk.  Pt verbalizes.  Patient instructed regarding medication; results given and questions answered. Nutritional counseling given.  Dietary factors affecting therapy addressed.  Patient instructed to monitor for excessive bruising or bleeding.        This document has been electronically signed by LIZET Bey on May 24, 2018 9:12 AM

## 2018-05-30 NOTE — PROGRESS NOTES
Pt states no changes to meds or diet.  No bleeding issues.  Adjusted coumadin dose slightly back to his previous coumadin dose but just short 2mg weekly.  Pt requests a two week appt.  Patient instructed regarding medication; results given and questions answered. Nutritional counseling given.  Dietary factors affecting therapy addressed.  Patient instructed to monitor for excessive bruising or bleeding.  Electronically signed by LIZET Velez

## 2018-06-13 NOTE — PROGRESS NOTES
Pt states no changes to meds or diet.  No bleeding issues.  Adjusted coumadin dose for today only and instructed pt to increase Vit K intake today with a broccoli serving.  Recheck INR in three wks.  Pt verbalizes.  Patient instructed regarding medication; results given and questions answered. Nutritional counseling given.  Dietary factors affecting therapy addressed.  Patient instructed to monitor for excessive bruising or bleeding.          This document has been electronically signed by LIZET Bey on June 13, 2018 8:16 AM

## 2018-07-03 NOTE — PROGRESS NOTES
Patient states no med changes or bleeding problems or unexplained bruising. Patient instructed to continue current dosing schedule. Verbalizes understanding. Will recheck 1 month.   Patient instructed regarding medication; results given and questions answered. Nutritional counseling given.  Dietary factors affecting therapy addressed.  Patient instructed to monitor for excessive bruising or bleeding.        This document has been electronically signed by LIZET Kemp on July 3, 2018 7:41 AM

## 2018-07-11 NOTE — PROGRESS NOTES
Pt called and stated he is starting Bactrim for 5 days. Pt has appt with Dr Lopez on Tuesday and chose to wait until Sunday to start taking Bactrim and appt made with [facility] on Tuesday.

## 2018-07-17 NOTE — PROGRESS NOTES
Pt started Bactrim on Sunday for 5 days as per previous note. Pt states he just finished steroids yesterday but he did not notify [facility] that he was taking those. Pt denies other med changes or bleeding problems. Pt has a bruise to right forehead. Pt states he fell over oxygen tubing and bumped his head. Pt was reminded that we always advise seeking evaluation for head injuries due to anticoagulant therapy especially due to possibility of late onset of symptoms; pt verbalized an understanding. Pt instructed on dosing schedule and to have a cup serving of green veggies today; pt verbalized. Patient instructed regarding medication; results given and questions answered. Nutritional counseling given.  Dietary factors affecting therapy addressed.  Patient instructed to monitor for excessive bruising or bleeding. Notify provider if you experience excessive bleeding from the nose, cuts, gums, rectum, or urinary tract. Reddish or brown urine or stool. Vomiting of blood or hemorrhoidal bleeding. If major injury occurs present to the Emergency Department. Will recheck in 2 days.         This document has been electronically signed by LIZET Kemp on July 17, 2018 8:32 AM

## 2018-07-18 NOTE — PROGRESS NOTES
Seth Montiel  82 y.o. male    07/17/2018  1. Nonrheumatic aortic valve disorder, unspecified    2. Atrial fibrillation, unspecified type (CMS/HCC)    3. Aortic valve disorder        History of Present Illness  Mr. Montiel is here For an evaluation of his above stated problems.  His predominant complaint is dyspnea on exertion which is a long-standing symptom but seems to have gotten worse. 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  He has had history of aortic valve replacement and echocardiogram in February of this year showed:  · Left ventricular wall thickness is consistent with mild concentric hypertrophy.  · Left ventricular systolic function is normal. Estimated EF = 58%.  · Left ventricular diastolic dysfunction (grade I a) consistent with impaired relaxation.  · Right ventricular cavity is borderline dilated.  · Left atrial cavity size is moderate-to-severely dilated.  · Mild mitral valve regurgitation is present  · Mild tricuspid valve regurgitation is present.  · Mild pulmonic valve regurgitation is present.  · There is a bioprosthetic valve present. The aortic valve peak and mean gradients are elevated. Peak gradient 67 and mean gradient 34 mm hg.. 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 Moderate transvalvular regurgitation is present.        SUBJECTIVE    No Known Allergies      Past Medical History:   Diagnosis Date   • Aortic valve disorder    • Atrial fibrillation (CMS/HCC)    • COPD (chronic obstructive pulmonary disease) (CMS/HCC)    • Hyperlipidemia    • Hypertension    • Nonrheumatic aortic valve disorder, unspecified    • SOB (shortness of breath)          Past Surgical History:   Procedure Laterality Date   • AORTIC VALVE SURGERY     • APPENDECTOMY     • CARDIAC SURGERY     • GALLBLADDER SURGERY     • ROTATOR CUFF REPAIR           Family History   Problem Relation Age of Onset   • No Known Problems Mother    • No Known Problems Father          Social History  "    Social History   • Marital status:      Spouse name: N/A   • Number of children: N/A   • Years of education: N/A     Occupational History   • Not on file.     Social History Main Topics   • Smoking status: Former Smoker   • Smokeless tobacco: Never Used   • Alcohol use Yes      Comment: social   • Drug use: No   • Sexual activity: Defer     Other Topics Concern   • Not on file     Social History Narrative   • No narrative on file         Current Outpatient Prescriptions   Medication Sig Dispense Refill   • aspirin 81 MG EC tablet Take 81 mg by mouth daily.     • atorvastatin (LIPITOR) 10 MG tablet Take 10 mg by mouth Every Night.     • Cholecalciferol (VITAMIN D3) 5000 UNITS capsule capsule Take 5,000 Units by mouth daily.     • diltiazem CD (CARDIZEM CD) 240 MG 24 hr capsule Take 240 mg by mouth daily.     • ferrous sulfate 325 (65 FE) MG tablet Take 325 mg by mouth 2 (two) times a day.     • flecainide (TAMBOCOR) 50 MG tablet TAKE ONE TABLET BY MOUTH EVERY 12 HOURS 60 tablet 7   • furosemide (LASIX) 40 MG tablet Take 40 mg by mouth 2 (two) times a day.     • levothyroxine (SYNTHROID, LEVOTHROID) 50 MCG tablet Take 50 mcg by mouth daily.     • Specialty Vitamins Products ([facility] EYE HEALTH FORMULA) capsule Take 1 capsule by mouth Daily.     • sulfamethoxazole-trimethoprim (BACTRIM DS,SEPTRA DS) 800-160 MG per tablet Take 1 tablet by mouth 2 (Two) Times a Day.     • warfarin (COUMADIN) 4 MG tablet Take 4 mg by mouth daily. As directed per [facility]       No current facility-administered medications for this visit.          OBJECTIVE    /55   Pulse 64   Ht 175.3 cm (69.02\")   Wt 78 kg (172 lb)   SpO2 95%   BMI 25.39 kg/m²         Review of Systems     Constitutional:  Denies recent weight loss, weight gain, fever or chills     HENT:  Denies any hearing loss, epistaxis, hoarseness, or difficulty speaking.     Eyes: Wears eyeglasses or contact lenses     Respiratory:  Dyspnea with exertion, " cough, wheezing, No hemoptysis. End stage COPD    Cardiovascular: Negative for palpations, chest pain    Gastrointestinal:  Denies change in bowel habits, dyspepsia, ulcer disease, hematochezia, or melena.     Endocrine: Negative for cold intolerance, heat intolerance, polydipsia, polyphagia and polyuria.     Genitourinary: Negative.      Musculoskeletal: DJD    Physical Exam     Constitutional: Cooperative, alert and oriented,i chronically ill appearing    HENT:   Head: Normocephalic, normal hair patterns, no masses or tenderness.  Ears, Nose, and Throat: No gross abnormalities. No pallor or cyanosis.   Eyes: EOMS intact, PERRL, conjunctivae and lids unremarkable. Fundoscopic exam and visual fields not performed.   Neck: No palpable masses or adenopathy, no thyromegaly, no JVD, carotid pulses are full and equal bilaterally and without  Bruits.     Cardiovascular: Regular rhythm, S1 and S2 normal, no S3 or S4.  2/6 systolic murmurs, No gallops, or rubs detected.     Pulmonary/Chest: Chest: Increased AP diameter of the chest, normal respiratory excursion, no intercostal retraction, no use of accessory muscles.            Pulmonary: Normal breath sounds.  Bilateral rhonchi  Abdominal: Abdomen soft, bowel sounds normoactive, no masses, no hepatosplenomegaly, non-tender, no bruits.     Musculoskeletal: No deformities, clubbing, cyanosis, erythema, or edema observed.     Neurological: No gross motor or sensory deficits noted, affect appropriate, oriented to time, person, place.     Skin: Warm and dry to the touch, no apparent skin lesions or masses noted.     Procedures      Lab Results   Component Value Date    WBC 6.6 07/19/2016    HGB 9.6 (L) 07/19/2016    HCT 29.7 (L) 07/19/2016    MCV 92.5 07/19/2016     07/19/2016     Lab Results   Component Value Date    GLUCOSE 76 07/19/2016    BUN 27 (H) 07/19/2016    CREATININE 1.3 07/19/2016    CO2 43 (H) 07/19/2016    CALCIUM 7.8 (L) 07/19/2016    ALBUMIN 2.7 (L)  07/17/2016    AST 44 07/17/2016    ALT 55 07/17/2016     No results found for: CHOL  No results found for: TRIG  No results found for: HDL  No components found for: LDLCALC  No results found for: LDL  No results found for: HDLLDLRATIO  No components found for: CHOLHDL  No results found for: HGBA1C  Lab Results   Component Value Date    TSH 3.75 04/22/2015           ASSESSMENT AND PLAN  Mr. 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  I did discuss different treatment options with him at this point but in view of his coronary status we will be restricted.  He indicated that he is not a candidate for anesthesia.   I did present the option of TAVR to him fully understanding the potential risks, with a possibility of some improvement in his dyspnea.  He wishes to think about it.  I have continued antiplatelet therapy with aspirin, anticoagulation with warfarin, lipid-lowering therapy with atorvastatin, management of atrial fibrillation with flecainide and Cardizem CD.  Lasix has been continued.    Seth was seen today for follow-up.    Diagnoses and all orders for this visit:    Nonrheumatic aortic valve disorder, unspecified    Atrial fibrillation, unspecified type (CMS/HCC)    Aortic valve disorder        Wu Mcarthur MD  7/17/2018  7:17 PM

## 2018-07-19 NOTE — PROGRESS NOTES
Pt continues Bactrim. Pt states he only took one Bactrim on Tuesday so he will finish Friday morning. Pt denies other med changes or bleeding problems. Pt states he ate a cup of broccoli on Tuesday after leaving here. Dose adjusted and pt instructed to have a cup serving of green veggies today and 1/2 cup tomorrow; pt verbalized. Patient instructed regarding medication; results given and questions answered. Nutritional counseling given.  Dietary factors affecting therapy addressed.  Patient instructed to monitor for excessive bruising or bleeding. Will recheck 5 days.         This document has been electronically signed by LIZET Kemp on July 19, 2018 8:00 AM

## 2018-07-24 NOTE — PROGRESS NOTES
Pt is no longer taking Bactrim and denies other med changes or bleeding problems. Pt placed back on usual dose and appt made for 1 month per his request. Patient instructed regarding medication; results given and questions answered. Nutritional counseling given.  Dietary factors affecting therapy addressed.  Patient instructed to monitor for excessive bruising or bleeding.         This document has been electronically signed by SOREN Kent @ on July 24, 2018 8:09 AM

## 2018-08-22 NOTE — PROGRESS NOTES
Pt states no changes to meds or diet.  No bleeding issues.  Pt states he did consume alcohol three days ago; pt has been counseled multiple times over the risks associated with alcohol consumption while taking warfarin.  Adjusted coumadin dose and instructed pt to increase Vit K intake today with a large broccoli serving.  Pt denies bleeding issues.  Pt requests next Wednesday for f/u appt. Patient instructed regarding medication; results given and questions answered. Nutritional counseling given.  Dietary factors affecting therapy addressed.  Patient instructed to monitor for excessive bruising or bleeding. Pt verbalizes instructions.        This document has been electronically signed by LIZET Kemp on August 22, 2018 8:10 AM

## 2018-08-29 NOTE — PROGRESS NOTES
Pt here today for recheck of elevated INR.  Pt denies medication changes or bleeding issues.  Will not reduce coumadin dose again but instructed pt to continue consistent green intake twice weekly.  Recheck INR next wk.  Pt verbalizes.  Patient instructed regarding medication; results given and questions answered. Nutritional counseling given.  Dietary factors affecting therapy addressed.  Patient instructed to monitor for excessive bruising or bleeding.        This document has been electronically signed by Macey Corrales, SOREN @ on August 29, 2018 8:14 AM

## 2018-09-04 NOTE — PROGRESS NOTES
Pt denies med changes or bleeding problems. Dose adjusted due to INR rising even on lower dose than usual. Pt instructed to have a dose of broccoli today; pt verbalized. Patient instructed regarding medication; results given and questions answered. Nutritional counseling given.  Dietary factors affecting therapy addressed.  Patient instructed to monitor for excessive bruising or bleeding. Will recheck next week.        This document has been electronically signed by LIZET Kemp on September 4, 2018 8:10 AM

## 2018-09-11 NOTE — PROGRESS NOTES
Pt denies med changes or bleeding problems. Pt wishes to increase vit K intake rather than decrease dose. Patient instructed regarding medication; results given and questions answered. Nutritional counseling given.  Dietary factors affecting therapy addressed.  Patient instructed to monitor for excessive bruising or bleeding. Will recheck in 2 weeks per pt request.         This document has been electronically signed by SOREN Kent @ on September 11, 2018 8:05 AM

## 2018-09-25 NOTE — PROGRESS NOTES
Patient states no med changes or bleeding problems or unexplained bruising. Patient instructed to continue current dosing schedule. Verbalizes understanding. Will recheck 1 month.   Patient instructed regarding medication; results given and questions answered. Nutritional counseling given.  Dietary factors affecting therapy addressed.  Patient instructed to monitor for excessive bruising or bleeding.        This document has been electronically signed by SOREN Kent @ on September 25, 2018 7:57 AM

## 2018-10-25 NOTE — PROGRESS NOTES
Patient states no med changes or bleeding problems or unexplained bruising. Patient instructed to continue current dosing schedule. Verbalizes understanding. Will recheck 1 month.  Patient instructed regarding medication; results given and questions answered. Nutritional counseling given.  Dietary factors affecting therapy addressed.  Patient instructed to monitor for excessive bruising or bleeding.         This document has been electronically signed by SOREN eKnt @ on October 25, 2018 8:05 AM

## 2018-11-27 NOTE — PROGRESS NOTES
Patient states no med changes or bleeding problems or unexplained bruising. Patient instructed to continue current dosing schedule. Verbalizes understanding. Will recheck 1 month.  Patient instructed regarding medication; results given and questions answered. Nutritional counseling given.  Dietary factors affecting therapy addressed.  Patient instructed to monitor for excessive bruising or bleeding.         This document has been electronically signed by LIZET Kemp on November 27, 2018 7:47 AM

## 2018-12-17 NOTE — PROGRESS NOTES
Patient states no med changes or bleeding problems or unexplained bruising. Patient instructed to continue current dosing schedule. Verbalizes understanding. Will recheck 1 month.   Patient instructed regarding medication; results given and questions answered. Nutritional counseling given.  Dietary factors affecting therapy addressed.  Patient instructed to monitor for excessive bruising or bleeding.        This document has been electronically signed by SOREN Kent @ on December 17, 2018 8:05 AM

## 2019-01-01 ENCOUNTER — ANESTHESIA (OUTPATIENT)
Dept: PERIOP | Facility: HOSPITAL | Age: 84
End: 2019-01-01

## 2019-01-01 ENCOUNTER — APPOINTMENT (OUTPATIENT)
Dept: GENERAL RADIOLOGY | Facility: HOSPITAL | Age: 84
End: 2019-01-01

## 2019-01-01 ENCOUNTER — ANESTHESIA EVENT (OUTPATIENT)
Dept: PERIOP | Facility: HOSPITAL | Age: 84
End: 2019-01-01

## 2019-01-01 ENCOUNTER — APPOINTMENT (OUTPATIENT)
Dept: CT IMAGING | Facility: HOSPITAL | Age: 84
End: 2019-01-01

## 2019-01-01 ENCOUNTER — DOCUMENTATION (OUTPATIENT)
Dept: CARDIAC SURGERY | Facility: CLINIC | Age: 84
End: 2019-01-01

## 2019-01-01 ENCOUNTER — LAB REQUISITION (OUTPATIENT)
Dept: LAB | Facility: HOSPITAL | Age: 84
End: 2019-01-01

## 2019-01-01 ENCOUNTER — HOSPITAL ENCOUNTER (INPATIENT)
Facility: HOSPITAL | Age: 84
LOS: 12 days | Discharge: NURSING, REHAB OR LONG-TERM CARE | End: 2019-03-08
Attending: INTERNAL MEDICINE | Admitting: UROLOGY

## 2019-01-01 ENCOUNTER — ANTICOAGULATION VISIT (OUTPATIENT)
Dept: CARDIAC SURGERY | Facility: CLINIC | Age: 84
End: 2019-01-01

## 2019-01-01 ENCOUNTER — APPOINTMENT (OUTPATIENT)
Dept: ULTRASOUND IMAGING | Facility: HOSPITAL | Age: 84
End: 2019-01-01

## 2019-01-01 ENCOUNTER — PREP FOR SURGERY (OUTPATIENT)
Dept: OTHER | Facility: HOSPITAL | Age: 84
End: 2019-01-01

## 2019-01-01 ENCOUNTER — OFFICE VISIT (OUTPATIENT)
Dept: CARDIOLOGY | Facility: CLINIC | Age: 84
End: 2019-01-01

## 2019-01-01 ENCOUNTER — HOSPITAL ENCOUNTER (INPATIENT)
Facility: HOSPITAL | Age: 84
LOS: 18 days | End: 2019-04-13
Attending: FAMILY MEDICINE | Admitting: INTERNAL MEDICINE

## 2019-01-01 ENCOUNTER — HOSPITAL ENCOUNTER (OUTPATIENT)
Facility: HOSPITAL | Age: 84
Setting detail: HOSPITAL OUTPATIENT SURGERY
End: 2019-01-01
Attending: UROLOGY | Admitting: UROLOGY

## 2019-01-01 ENCOUNTER — APPOINTMENT (OUTPATIENT)
Dept: CARDIOLOGY | Facility: HOSPITAL | Age: 84
End: 2019-01-01

## 2019-01-01 VITALS
BODY MASS INDEX: 24.41 KG/M2 | DIASTOLIC BLOOD PRESSURE: 66 MMHG | RESPIRATION RATE: 18 BRPM | HEART RATE: 81 BPM | WEIGHT: 164.8 LBS | HEIGHT: 69 IN | OXYGEN SATURATION: 93 % | TEMPERATURE: 97 F | SYSTOLIC BLOOD PRESSURE: 118 MMHG

## 2019-01-01 VITALS
HEART RATE: 114 BPM | WEIGHT: 145 LBS | OXYGEN SATURATION: 98 % | RESPIRATION RATE: 18 BRPM | HEIGHT: 69 IN | TEMPERATURE: 96.9 F | SYSTOLIC BLOOD PRESSURE: 134 MMHG | DIASTOLIC BLOOD PRESSURE: 50 MMHG | BODY MASS INDEX: 21.48 KG/M2

## 2019-01-01 VITALS — OXYGEN SATURATION: 97 % | HEART RATE: 86 BPM

## 2019-01-01 VITALS
DIASTOLIC BLOOD PRESSURE: 80 MMHG | BODY MASS INDEX: 25.48 KG/M2 | OXYGEN SATURATION: 95 % | WEIGHT: 172 LBS | HEART RATE: 68 BPM | SYSTOLIC BLOOD PRESSURE: 128 MMHG | HEIGHT: 69 IN

## 2019-01-01 VITALS — HEART RATE: 76 BPM | SYSTOLIC BLOOD PRESSURE: 122 MMHG | DIASTOLIC BLOOD PRESSURE: 50 MMHG

## 2019-01-01 DIAGNOSIS — Q62.11 HYDRONEPHROSIS WITH URETEROPELVIC JUNCTION (UPJ) OBSTRUCTION: ICD-10-CM

## 2019-01-01 DIAGNOSIS — Z79.01 LONG TERM CURRENT USE OF ANTICOAGULANT THERAPY: ICD-10-CM

## 2019-01-01 DIAGNOSIS — Z78.9 IMPAIRED MOBILITY AND ADLS: ICD-10-CM

## 2019-01-01 DIAGNOSIS — R13.10 DYSPHAGIA, UNSPECIFIED TYPE: ICD-10-CM

## 2019-01-01 DIAGNOSIS — N13.30 HYDRONEPHROSIS: ICD-10-CM

## 2019-01-01 DIAGNOSIS — J18.9 PNEUMONIA OF LEFT LOWER LOBE DUE TO INFECTIOUS ORGANISM: Primary | ICD-10-CM

## 2019-01-01 DIAGNOSIS — N13.30 HYDRONEPHROSIS: Primary | ICD-10-CM

## 2019-01-01 DIAGNOSIS — Z74.09 IMPAIRED MOBILITY AND ADLS: ICD-10-CM

## 2019-01-01 DIAGNOSIS — T45.511A POISONING BY WARFARIN SODIUM, ACCIDENTAL OR UNINTENTIONAL, INITIAL ENCOUNTER: ICD-10-CM

## 2019-01-01 DIAGNOSIS — Z74.09 IMPAIRED FUNCTIONAL MOBILITY, BALANCE, GAIT, AND ENDURANCE: ICD-10-CM

## 2019-01-01 DIAGNOSIS — I48.91 ATRIAL FIBRILLATION, UNSPECIFIED TYPE (HCC): ICD-10-CM

## 2019-01-01 DIAGNOSIS — I35.9 NONRHEUMATIC AORTIC VALVE DISORDER, UNSPECIFIED: Primary | ICD-10-CM

## 2019-01-01 DIAGNOSIS — N13.30 HYDRONEPHROSIS, UNSPECIFIED HYDRONEPHROSIS TYPE: Primary | ICD-10-CM

## 2019-01-01 DIAGNOSIS — R31.0 GROSS HEMATURIA: ICD-10-CM

## 2019-01-01 DIAGNOSIS — Z74.09 IMPAIRED MOBILITY AND ACTIVITIES OF DAILY LIVING: ICD-10-CM

## 2019-01-01 DIAGNOSIS — Z79.01 LONG TERM CURRENT USE OF ANTICOAGULANT: ICD-10-CM

## 2019-01-01 DIAGNOSIS — N17.9 AKI (ACUTE KIDNEY INJURY) (HCC): ICD-10-CM

## 2019-01-01 DIAGNOSIS — R06.02 SOB (SHORTNESS OF BREATH): ICD-10-CM

## 2019-01-01 DIAGNOSIS — I35.9 AORTIC VALVE DISORDER: ICD-10-CM

## 2019-01-01 DIAGNOSIS — Z78.9 IMPAIRED MOBILITY AND ACTIVITIES OF DAILY LIVING: ICD-10-CM

## 2019-01-01 DIAGNOSIS — Z74.09 IMPAIRED PHYSICAL MOBILITY: ICD-10-CM

## 2019-01-01 LAB
% EOS HANSEL STAIN: 1 %
ABO + RH BLD: NORMAL
ABO + RH BLD: NORMAL
ABO GROUP BLD: NORMAL
ABO GROUP BLD: NORMAL
ALBUMIN SERPL-MCNC: 2.9 G/DL (ref 3.5–5.2)
ALBUMIN SERPL-MCNC: 3 G/DL (ref 3.4–4.8)
ALBUMIN SERPL-MCNC: 3.4 G/DL (ref 3.5–5.2)
ALBUMIN/GLOB SERPL: 1.1 G/DL
ALBUMIN/GLOB SERPL: 1.2 G/DL
ALBUMIN/GLOB SERPL: 1.3 G/DL (ref 1.1–1.8)
ALP SERPL-CCNC: 103 U/L (ref 39–117)
ALP SERPL-CCNC: 75 U/L (ref 39–117)
ALP SERPL-CCNC: 83 U/L (ref 38–126)
ALT SERPL W P-5'-P-CCNC: 10 U/L (ref 1–41)
ALT SERPL W P-5'-P-CCNC: 14 U/L (ref 1–41)
ALT SERPL W P-5'-P-CCNC: 33 U/L (ref 21–72)
ANION GAP SERPL CALCULATED.3IONS-SCNC: 10 MMOL/L
ANION GAP SERPL CALCULATED.3IONS-SCNC: 10 MMOL/L
ANION GAP SERPL CALCULATED.3IONS-SCNC: 11 MMOL/L
ANION GAP SERPL CALCULATED.3IONS-SCNC: 12 MMOL/L
ANION GAP SERPL CALCULATED.3IONS-SCNC: 12 MMOL/L
ANION GAP SERPL CALCULATED.3IONS-SCNC: 13 MMOL/L
ANION GAP SERPL CALCULATED.3IONS-SCNC: 14 MMOL/L
ANION GAP SERPL CALCULATED.3IONS-SCNC: 14 MMOL/L
ANION GAP SERPL CALCULATED.3IONS-SCNC: 16 MMOL/L
ANION GAP SERPL CALCULATED.3IONS-SCNC: 3 MMOL/L (ref 5–15)
ANION GAP SERPL CALCULATED.3IONS-SCNC: 4 MMOL/L (ref 5–15)
ANION GAP SERPL CALCULATED.3IONS-SCNC: 4 MMOL/L (ref 5–15)
ANION GAP SERPL CALCULATED.3IONS-SCNC: 5 MMOL/L (ref 5–15)
ANION GAP SERPL CALCULATED.3IONS-SCNC: 6 MMOL/L (ref 5–15)
ANION GAP SERPL CALCULATED.3IONS-SCNC: 6 MMOL/L (ref 5–15)
ANION GAP SERPL CALCULATED.3IONS-SCNC: 7 MMOL/L
ANION GAP SERPL CALCULATED.3IONS-SCNC: 7 MMOL/L (ref 5–15)
ANION GAP SERPL CALCULATED.3IONS-SCNC: 7 MMOL/L (ref 5–15)
ANION GAP SERPL CALCULATED.3IONS-SCNC: 8 MMOL/L
ANION GAP SERPL CALCULATED.3IONS-SCNC: 8 MMOL/L
ANISOCYTOSIS BLD QL: ABNORMAL
AST SERPL-CCNC: 17 U/L (ref 1–40)
AST SERPL-CCNC: 21 U/L (ref 17–59)
AST SERPL-CCNC: 27 U/L (ref 1–40)
B PERT DNA SPEC QL NAA+PROBE: NOT DETECTED
BACTERIA BLD CULT: ABNORMAL
BACTERIA SPEC AEROBE CULT: ABNORMAL
BACTERIA SPEC AEROBE CULT: NO GROWTH
BACTERIA SPEC AEROBE CULT: NORMAL
BACTERIA SPEC RESP CULT: NORMAL
BACTERIA UR QL AUTO: ABNORMAL /HPF
BASOPHILS # BLD AUTO: 0 10*3/MM3 (ref 0–0.2)
BASOPHILS # BLD AUTO: 0 10*3/MM3 (ref 0–0.2)
BASOPHILS # BLD AUTO: 0.01 10*3/MM3 (ref 0–0.2)
BASOPHILS # BLD AUTO: 0.02 10*3/MM3 (ref 0–0.2)
BASOPHILS # BLD AUTO: 0.03 10*3/MM3 (ref 0–0.2)
BASOPHILS # BLD AUTO: 0.04 10*3/MM3 (ref 0–0.2)
BASOPHILS # BLD MANUAL: 0.11 10*3/MM3 (ref 0–0.2)
BASOPHILS NFR BLD AUTO: 0 % (ref 0–1.5)
BASOPHILS NFR BLD AUTO: 0 % (ref 0–1.5)
BASOPHILS NFR BLD AUTO: 0.1 % (ref 0–1.5)
BASOPHILS NFR BLD AUTO: 0.2 % (ref 0–1.5)
BASOPHILS NFR BLD AUTO: 0.3 % (ref 0–1.5)
BASOPHILS NFR BLD AUTO: 1 % (ref 0–1.5)
BH BB BLOOD EXPIRATION DATE: NORMAL
BH BB BLOOD EXPIRATION DATE: NORMAL
BH BB BLOOD TYPE BARCODE: 6200
BH BB BLOOD TYPE BARCODE: 8400
BH BB DISPENSE STATUS: NORMAL
BH BB DISPENSE STATUS: NORMAL
BH BB PRODUCT CODE: NORMAL
BH BB PRODUCT CODE: NORMAL
BH BB UNIT NUMBER: NORMAL
BH BB UNIT NUMBER: NORMAL
BH CV ECHO MEAS - AI DEC SLOPE: 438 CM/SEC^2
BH CV ECHO MEAS - AI MAX PG: 92.5 MMHG
BH CV ECHO MEAS - AI MAX VEL: 481 CM/SEC
BH CV ECHO MEAS - AI P1/2T: 321.6 MSEC
BH CV ECHO MEAS - AO ISTHMUS: 2.1 CM
BH CV ECHO MEAS - AO MAX PG (FULL): 66.7 MMHG
BH CV ECHO MEAS - AO MAX PG: 79.2 MMHG
BH CV ECHO MEAS - AO MEAN PG (FULL): 34 MMHG
BH CV ECHO MEAS - AO MEAN PG: 41 MMHG
BH CV ECHO MEAS - AO ROOT AREA (BSA CORRECTED): 1.4
BH CV ECHO MEAS - AO ROOT AREA: 4.9 CM^2
BH CV ECHO MEAS - AO ROOT DIAM: 2.5 CM
BH CV ECHO MEAS - AO V2 MAX: 445 CM/SEC
BH CV ECHO MEAS - AO V2 MEAN: 298 CM/SEC
BH CV ECHO MEAS - AO V2 VTI: 72.2 CM
BH CV ECHO MEAS - ASC AORTA: 2.6 CM
BH CV ECHO MEAS - AVA(I,A): 1.5 CM^2
BH CV ECHO MEAS - AVA(I,D): 1.5 CM^2
BH CV ECHO MEAS - AVA(V,A): 1.2 CM^2
BH CV ECHO MEAS - AVA(V,D): 1.2 CM^2
BH CV ECHO MEAS - BSA(HAYCOCK): 1.8 M^2
BH CV ECHO MEAS - BSA: 1.8 M^2
BH CV ECHO MEAS - BZI_BMI: 22.3 KILOGRAMS/M^2
BH CV ECHO MEAS - BZI_METRIC_HEIGHT: 175.3 CM
BH CV ECHO MEAS - BZI_METRIC_WEIGHT: 68.5 KG
BH CV ECHO MEAS - EDV(CUBED): 185.2 ML
BH CV ECHO MEAS - EDV(TEICH): 160 ML
BH CV ECHO MEAS - EF(CUBED): 74.6 %
BH CV ECHO MEAS - EF(TEICH): 65.8 %
BH CV ECHO MEAS - ESV(CUBED): 47 ML
BH CV ECHO MEAS - ESV(TEICH): 54.8 ML
BH CV ECHO MEAS - FS: 36.7 %
BH CV ECHO MEAS - IVS/LVPW: 0.8
BH CV ECHO MEAS - IVSD: 0.93 CM
BH CV ECHO MEAS - LA DIMENSION: 5.1 CM
BH CV ECHO MEAS - LA/AO: 2
BH CV ECHO MEAS - LV MASS(C)D: 239.1 GRAMS
BH CV ECHO MEAS - LV MASS(C)DI: 130.4 GRAMS/M^2
BH CV ECHO MEAS - LV MAX PG: 12.5 MMHG
BH CV ECHO MEAS - LV MEAN PG: 7 MMHG
BH CV ECHO MEAS - LV V1 MAX: 177 CM/SEC
BH CV ECHO MEAS - LV V1 MEAN: 119 CM/SEC
BH CV ECHO MEAS - LV V1 VTI: 33.9 CM
BH CV ECHO MEAS - LVIDD: 5.7 CM
BH CV ECHO MEAS - LVIDS: 3.6 CM
BH CV ECHO MEAS - LVOT AREA (M): 3.1 CM^2
BH CV ECHO MEAS - LVOT AREA: 3.1 CM^2
BH CV ECHO MEAS - LVOT DIAM: 2 CM
BH CV ECHO MEAS - LVPWD: 1.2 CM
BH CV ECHO MEAS - MR MAX PG: 116.6 MMHG
BH CV ECHO MEAS - MR MAX VEL: 540 CM/SEC
BH CV ECHO MEAS - MV A MAX VEL: 27.7 CM/SEC
BH CV ECHO MEAS - MV DEC SLOPE: 1480 CM/SEC^2
BH CV ECHO MEAS - MV E MAX VEL: 131 CM/SEC
BH CV ECHO MEAS - MV E/A: 4.7
BH CV ECHO MEAS - MV MAX PG: 8 MMHG
BH CV ECHO MEAS - MV MEAN PG: 3 MMHG
BH CV ECHO MEAS - MV P1/2T MAX VEL: 142 CM/SEC
BH CV ECHO MEAS - MV P1/2T: 28.1 MSEC
BH CV ECHO MEAS - MV V2 MAX: 141 CM/SEC
BH CV ECHO MEAS - MV V2 MEAN: 81.2 CM/SEC
BH CV ECHO MEAS - MV V2 VTI: 35.4 CM
BH CV ECHO MEAS - MVA P1/2T LCG: 1.5 CM^2
BH CV ECHO MEAS - MVA(P1/2T): 7.8 CM^2
BH CV ECHO MEAS - MVA(VTI): 3 CM^2
BH CV ECHO MEAS - PA MAX PG: 10.1 MMHG
BH CV ECHO MEAS - PA V2 MAX: 159 CM/SEC
BH CV ECHO MEAS - PI END-D VEL: 61.1 CM/SEC
BH CV ECHO MEAS - RVDD: 3.1 CM
BH CV ECHO MEAS - SI(AO): 193.3 ML/M^2
BH CV ECHO MEAS - SI(CUBED): 75.4 ML/M^2
BH CV ECHO MEAS - SI(LVOT): 58.1 ML/M^2
BH CV ECHO MEAS - SI(TEICH): 57.4 ML/M^2
BH CV ECHO MEAS - SV(AO): 354.4 ML
BH CV ECHO MEAS - SV(CUBED): 138.1 ML
BH CV ECHO MEAS - SV(LVOT): 106.5 ML
BH CV ECHO MEAS - SV(TEICH): 105.2 ML
BH CV ECHO MEAS - TR MAX VEL: 353 CM/SEC
BILIRUB SERPL-MCNC: 0.4 MG/DL (ref 0.2–1.2)
BILIRUB SERPL-MCNC: 0.5 MG/DL (ref 0.2–1.2)
BILIRUB SERPL-MCNC: 1 MG/DL (ref 0.2–1.3)
BILIRUB UR QL STRIP: ABNORMAL
BILIRUB UR QL STRIP: NEGATIVE
BLD GP AB SCN SERPL QL: NEGATIVE
BLD GP AB SCN SERPL QL: NEGATIVE
BUN BLD-MCNC: 24 MG/DL (ref 7–21)
BUN BLD-MCNC: 25 MG/DL (ref 7–21)
BUN BLD-MCNC: 29 MG/DL (ref 7–21)
BUN BLD-MCNC: 29 MG/DL (ref 7–21)
BUN BLD-MCNC: 30 MG/DL (ref 8–23)
BUN BLD-MCNC: 31 MG/DL (ref 7–21)
BUN BLD-MCNC: 31 MG/DL (ref 8–23)
BUN BLD-MCNC: 34 MG/DL (ref 7–21)
BUN BLD-MCNC: 35 MG/DL (ref 8–23)
BUN BLD-MCNC: 41 MG/DL (ref 8–23)
BUN BLD-MCNC: 42 MG/DL (ref 7–21)
BUN BLD-MCNC: 42 MG/DL (ref 8–23)
BUN BLD-MCNC: 44 MG/DL (ref 8–23)
BUN BLD-MCNC: 44 MG/DL (ref 8–23)
BUN BLD-MCNC: 47 MG/DL (ref 7–21)
BUN BLD-MCNC: 47 MG/DL (ref 8–23)
BUN BLD-MCNC: 51 MG/DL (ref 8–23)
BUN BLD-MCNC: 51 MG/DL (ref 8–23)
BUN BLD-MCNC: 52 MG/DL (ref 8–23)
BUN BLD-MCNC: 53 MG/DL (ref 8–23)
BUN BLD-MCNC: 55 MG/DL (ref 7–21)
BUN BLD-MCNC: 57 MG/DL (ref 7–21)
BUN BLD-MCNC: 57 MG/DL (ref 7–21)
BUN BLD-MCNC: 59 MG/DL (ref 8–23)
BUN BLD-MCNC: 65 MG/DL (ref 8–23)
BUN/CREAT SERPL: 15.8 (ref 7–25)
BUN/CREAT SERPL: 15.9 (ref 7–25)
BUN/CREAT SERPL: 16.7 (ref 7–25)
BUN/CREAT SERPL: 18.3 (ref 7–25)
BUN/CREAT SERPL: 18.8 (ref 7–25)
BUN/CREAT SERPL: 19.6 (ref 7–25)
BUN/CREAT SERPL: 20.1 (ref 7–25)
BUN/CREAT SERPL: 21 (ref 7–25)
BUN/CREAT SERPL: 21 (ref 7–25)
BUN/CREAT SERPL: 21.7 (ref 7–25)
BUN/CREAT SERPL: 21.7 (ref 7–25)
BUN/CREAT SERPL: 23 (ref 7–25)
BUN/CREAT SERPL: 23.4 (ref 7–25)
BUN/CREAT SERPL: 23.8 (ref 7–25)
BUN/CREAT SERPL: 23.8 (ref 7–25)
BUN/CREAT SERPL: 23.9 (ref 7–25)
BUN/CREAT SERPL: 24 (ref 7–25)
BUN/CREAT SERPL: 24.1 (ref 7–25)
BUN/CREAT SERPL: 24.6 (ref 7–25)
BUN/CREAT SERPL: 24.7 (ref 7–25)
BUN/CREAT SERPL: 25.1 (ref 7–25)
BUN/CREAT SERPL: 26.1 (ref 7–25)
BUN/CREAT SERPL: 26.6 (ref 7–25)
BUN/CREAT SERPL: 27.2 (ref 7–25)
BUN/CREAT SERPL: 27.5 (ref 7–25)
BUN/CREAT SERPL: 30.8 (ref 7–25)
BUN/CREAT SERPL: 30.8 (ref 7–25)
BUN/CREAT SERPL: 31.5 (ref 7–25)
BUN/CREAT SERPL: 31.8 (ref 7–25)
BUN/CREAT SERPL: 31.8 (ref 7–25)
C PNEUM DNA NPH QL NAA+NON-PROBE: NOT DETECTED
CALCIUM SPEC-SCNC: 8 MG/DL (ref 8.4–10.2)
CALCIUM SPEC-SCNC: 8.1 MG/DL (ref 8.4–10.2)
CALCIUM SPEC-SCNC: 8.4 MG/DL (ref 8.4–10.2)
CALCIUM SPEC-SCNC: 8.5 MG/DL (ref 8.4–10.2)
CALCIUM SPEC-SCNC: 8.6 MG/DL (ref 8.4–10.2)
CALCIUM SPEC-SCNC: 8.7 MG/DL (ref 8.4–10.2)
CALCIUM SPEC-SCNC: 8.7 MG/DL (ref 8.6–10.5)
CALCIUM SPEC-SCNC: 8.8 MG/DL (ref 8.6–10.5)
CALCIUM SPEC-SCNC: 8.9 MG/DL (ref 8.6–10.5)
CALCIUM SPEC-SCNC: 9 MG/DL (ref 8.6–10.5)
CALCIUM SPEC-SCNC: 9 MG/DL (ref 8.6–10.5)
CALCIUM SPEC-SCNC: 9.1 MG/DL (ref 8.6–10.5)
CALCIUM SPEC-SCNC: 9.2 MG/DL (ref 8.6–10.5)
CALCIUM SPEC-SCNC: 9.2 MG/DL (ref 8.6–10.5)
CALCIUM SPEC-SCNC: 9.4 MG/DL (ref 8.6–10.5)
CALCIUM SPEC-SCNC: 9.5 MG/DL (ref 8.6–10.5)
CALCIUM SPEC-SCNC: 9.8 MG/DL (ref 8.6–10.5)
CHLORIDE SERPL-SCNC: 100 MMOL/L (ref 95–110)
CHLORIDE SERPL-SCNC: 100 MMOL/L (ref 98–107)
CHLORIDE SERPL-SCNC: 101 MMOL/L (ref 95–110)
CHLORIDE SERPL-SCNC: 102 MMOL/L (ref 95–110)
CHLORIDE SERPL-SCNC: 103 MMOL/L (ref 98–107)
CHLORIDE SERPL-SCNC: 103 MMOL/L (ref 98–107)
CHLORIDE SERPL-SCNC: 104 MMOL/L (ref 95–110)
CHLORIDE SERPL-SCNC: 104 MMOL/L (ref 95–110)
CHLORIDE SERPL-SCNC: 105 MMOL/L (ref 95–110)
CHLORIDE SERPL-SCNC: 105 MMOL/L (ref 95–110)
CHLORIDE SERPL-SCNC: 107 MMOL/L (ref 95–110)
CHLORIDE SERPL-SCNC: 91 MMOL/L (ref 95–110)
CHLORIDE SERPL-SCNC: 94 MMOL/L (ref 95–110)
CHLORIDE SERPL-SCNC: 94 MMOL/L (ref 98–107)
CHLORIDE SERPL-SCNC: 95 MMOL/L (ref 98–107)
CHLORIDE SERPL-SCNC: 96 MMOL/L (ref 95–110)
CHLORIDE SERPL-SCNC: 96 MMOL/L (ref 98–107)
CHLORIDE SERPL-SCNC: 96 MMOL/L (ref 98–107)
CHLORIDE SERPL-SCNC: 97 MMOL/L (ref 98–107)
CHLORIDE SERPL-SCNC: 98 MMOL/L (ref 98–107)
CHLORIDE SERPL-SCNC: 99 MMOL/L (ref 98–107)
CLARITY UR: ABNORMAL
CO2 SERPL-SCNC: 27 MMOL/L (ref 22–31)
CO2 SERPL-SCNC: 28 MMOL/L (ref 22–31)
CO2 SERPL-SCNC: 29 MMOL/L (ref 22–31)
CO2 SERPL-SCNC: 30 MMOL/L (ref 22–31)
CO2 SERPL-SCNC: 30 MMOL/L (ref 22–31)
CO2 SERPL-SCNC: 31 MMOL/L (ref 22–29)
CO2 SERPL-SCNC: 31 MMOL/L (ref 22–31)
CO2 SERPL-SCNC: 32 MMOL/L (ref 22–29)
CO2 SERPL-SCNC: 32 MMOL/L (ref 22–31)
CO2 SERPL-SCNC: 32 MMOL/L (ref 22–31)
CO2 SERPL-SCNC: 33 MMOL/L (ref 22–29)
CO2 SERPL-SCNC: 33 MMOL/L (ref 22–31)
CO2 SERPL-SCNC: 33 MMOL/L (ref 22–31)
CO2 SERPL-SCNC: 35 MMOL/L (ref 22–31)
CO2 SERPL-SCNC: 37 MMOL/L (ref 22–29)
CO2 SERPL-SCNC: 37 MMOL/L (ref 22–29)
CO2 SERPL-SCNC: 39 MMOL/L (ref 22–29)
CO2 SERPL-SCNC: 40 MMOL/L (ref 22–29)
CO2 SERPL-SCNC: 41 MMOL/L (ref 22–29)
CO2 SERPL-SCNC: 41 MMOL/L (ref 22–29)
CO2 SERPL-SCNC: 42 MMOL/L (ref 22–29)
COLOR UR: ABNORMAL
COLOR UR: ABNORMAL
COLOR UR: YELLOW
COLOR UR: YELLOW
CREAT BLD-MCNC: 1.29 MG/DL (ref 0.76–1.27)
CREAT BLD-MCNC: 1.3 MG/DL (ref 0.76–1.27)
CREAT BLD-MCNC: 1.31 MG/DL (ref 0.7–1.3)
CREAT BLD-MCNC: 1.32 MG/DL (ref 0.76–1.27)
CREAT BLD-MCNC: 1.33 MG/DL (ref 0.76–1.27)
CREAT BLD-MCNC: 1.38 MG/DL (ref 0.76–1.27)
CREAT BLD-MCNC: 1.43 MG/DL (ref 0.76–1.27)
CREAT BLD-MCNC: 1.43 MG/DL (ref 0.76–1.27)
CREAT BLD-MCNC: 1.44 MG/DL (ref 0.7–1.3)
CREAT BLD-MCNC: 1.44 MG/DL (ref 0.7–1.3)
CREAT BLD-MCNC: 1.46 MG/DL (ref 0.76–1.27)
CREAT BLD-MCNC: 1.48 MG/DL (ref 0.7–1.3)
CREAT BLD-MCNC: 1.51 MG/DL (ref 0.76–1.27)
CREAT BLD-MCNC: 1.52 MG/DL (ref 0.7–1.3)
CREAT BLD-MCNC: 1.57 MG/DL (ref 0.7–1.3)
CREAT BLD-MCNC: 1.6 MG/DL (ref 0.76–1.27)
CREAT BLD-MCNC: 1.62 MG/DL (ref 0.7–1.3)
CREAT BLD-MCNC: 1.65 MG/DL (ref 0.7–1.3)
CREAT BLD-MCNC: 1.67 MG/DL (ref 0.7–1.3)
CREAT BLD-MCNC: 1.77 MG/DL (ref 0.7–1.3)
CREAT BLD-MCNC: 1.8 MG/DL (ref 0.76–1.27)
CREAT BLD-MCNC: 2.12 MG/DL (ref 0.76–1.27)
CREAT BLD-MCNC: 2.14 MG/DL (ref 0.76–1.27)
CREAT BLD-MCNC: 2.23 MG/DL (ref 0.7–1.3)
CREAT BLD-MCNC: 2.3 MG/DL (ref 0.76–1.27)
CREAT BLD-MCNC: 2.32 MG/DL (ref 0.7–1.3)
CREAT BLD-MCNC: 2.38 MG/DL (ref 0.7–1.3)
CREAT BLD-MCNC: 2.48 MG/DL (ref 0.76–1.27)
CREAT BLD-MCNC: 2.52 MG/DL (ref 0.76–1.27)
CREAT BLD-MCNC: 2.73 MG/DL (ref 0.76–1.27)
D-LACTATE SERPL-SCNC: 0.7 MMOL/L (ref 0.5–2)
DEPRECATED RDW RBC AUTO: 47.6 FL (ref 37–54)
DEPRECATED RDW RBC AUTO: 48.1 FL (ref 37–54)
DEPRECATED RDW RBC AUTO: 48.4 FL (ref 37–54)
DEPRECATED RDW RBC AUTO: 48.9 FL (ref 37–54)
DEPRECATED RDW RBC AUTO: 49.5 FL (ref 37–54)
DEPRECATED RDW RBC AUTO: 50.2 FL (ref 37–54)
DEPRECATED RDW RBC AUTO: 50.9 FL (ref 37–54)
DEPRECATED RDW RBC AUTO: 51 FL (ref 37–54)
DEPRECATED RDW RBC AUTO: 51.1 FL (ref 37–54)
DEPRECATED RDW RBC AUTO: 51.2 FL (ref 37–54)
DEPRECATED RDW RBC AUTO: 51.2 FL (ref 37–54)
DEPRECATED RDW RBC AUTO: 51.4 FL (ref 37–54)
DEPRECATED RDW RBC AUTO: 51.7 FL (ref 37–54)
DEPRECATED RDW RBC AUTO: 51.9 FL (ref 37–54)
DEPRECATED RDW RBC AUTO: 52.1 FL (ref 37–54)
DEPRECATED RDW RBC AUTO: 52.2 FL (ref 37–54)
DEPRECATED RDW RBC AUTO: 52.6 FL (ref 37–54)
DEPRECATED RDW RBC AUTO: 52.8 FL (ref 37–54)
DEPRECATED RDW RBC AUTO: 53 FL (ref 37–54)
DEPRECATED RDW RBC AUTO: 53.4 FL (ref 37–54)
DEPRECATED RDW RBC AUTO: 53.5 FL (ref 37–54)
DEPRECATED RDW RBC AUTO: 54.4 FL (ref 37–54)
DEPRECATED RDW RBC AUTO: 54.5 FL (ref 37–54)
DEPRECATED RDW RBC AUTO: 54.5 FL (ref 37–54)
DEPRECATED RDW RBC AUTO: 55 FL (ref 37–54)
DEPRECATED RDW RBC AUTO: 55.1 FL (ref 37–54)
DEPRECATED RDW RBC AUTO: 55.3 FL (ref 37–54)
DEPRECATED RDW RBC AUTO: 55.8 FL (ref 37–54)
DEPRECATED RDW RBC AUTO: 56.4 FL (ref 37–54)
DEPRECATED RDW RBC AUTO: 57.1 FL (ref 37–54)
EOSINOPHIL # BLD AUTO: 0 10*3/MM3 (ref 0–0.4)
EOSINOPHIL # BLD AUTO: 0.01 10*3/MM3 (ref 0–0.4)
EOSINOPHIL # BLD AUTO: 0.02 10*3/MM3 (ref 0–0.4)
EOSINOPHIL # BLD AUTO: 0.03 10*3/MM3 (ref 0–0.4)
EOSINOPHIL # BLD AUTO: 0.03 10*3/MM3 (ref 0–0.4)
EOSINOPHIL # BLD AUTO: 0.04 10*3/MM3 (ref 0–0.4)
EOSINOPHIL # BLD AUTO: 0.04 10*3/MM3 (ref 0–0.4)
EOSINOPHIL # BLD AUTO: 0.07 10*3/MM3 (ref 0–0.4)
EOSINOPHIL # BLD AUTO: 0.08 10*3/MM3 (ref 0–0.4)
EOSINOPHIL # BLD AUTO: 0.1 10*3/MM3 (ref 0–0.4)
EOSINOPHIL # BLD AUTO: 0.1 10*3/MM3 (ref 0–0.4)
EOSINOPHIL # BLD AUTO: 0.11 10*3/MM3 (ref 0–0.4)
EOSINOPHIL # BLD AUTO: 0.15 10*3/MM3 (ref 0–0.4)
EOSINOPHIL # BLD AUTO: 0.16 10*3/MM3 (ref 0–0.4)
EOSINOPHIL # BLD AUTO: 0.18 10*3/MM3 (ref 0–0.4)
EOSINOPHIL # BLD AUTO: 0.2 10*3/MM3 (ref 0–0.4)
EOSINOPHIL # BLD AUTO: 0.2 10*3/MM3 (ref 0–0.4)
EOSINOPHIL # BLD AUTO: 0.23 10*3/MM3 (ref 0–0.4)
EOSINOPHIL # BLD AUTO: 0.24 10*3/MM3 (ref 0–0.4)
EOSINOPHIL # BLD MANUAL: 0.11 10*3/MM3 (ref 0–0.4)
EOSINOPHIL NFR BLD AUTO: 0 % (ref 0.3–6.2)
EOSINOPHIL NFR BLD AUTO: 0.1 % (ref 0.3–6.2)
EOSINOPHIL NFR BLD AUTO: 0.1 % (ref 0.3–6.2)
EOSINOPHIL NFR BLD AUTO: 0.2 % (ref 0.3–6.2)
EOSINOPHIL NFR BLD AUTO: 0.3 % (ref 0.3–6.2)
EOSINOPHIL NFR BLD AUTO: 0.4 % (ref 0.3–6.2)
EOSINOPHIL NFR BLD AUTO: 0.4 % (ref 0.3–6.2)
EOSINOPHIL NFR BLD AUTO: 0.7 % (ref 0.3–6.2)
EOSINOPHIL NFR BLD AUTO: 0.8 % (ref 0.3–6.2)
EOSINOPHIL NFR BLD AUTO: 0.9 % (ref 0.3–6.2)
EOSINOPHIL NFR BLD AUTO: 1 % (ref 0.3–6.2)
EOSINOPHIL NFR BLD AUTO: 1 % (ref 0.3–6.2)
EOSINOPHIL NFR BLD AUTO: 1.3 % (ref 0.3–6.2)
EOSINOPHIL NFR BLD AUTO: 1.5 % (ref 0.3–6.2)
EOSINOPHIL NFR BLD AUTO: 1.6 % (ref 0.3–6.2)
EOSINOPHIL NFR BLD AUTO: 1.6 % (ref 0.3–6.2)
EOSINOPHIL NFR BLD AUTO: 1.7 % (ref 0.3–6.2)
EOSINOPHIL NFR BLD AUTO: 1.9 % (ref 0.3–6.2)
EOSINOPHIL NFR BLD AUTO: 2.4 % (ref 0.3–6.2)
EOSINOPHIL NFR BLD AUTO: 2.5 % (ref 0.3–6.2)
EOSINOPHIL NFR BLD MANUAL: 1 % (ref 0.3–6.2)
ERYTHROCYTE [DISTWIDTH] IN BLOOD BY AUTOMATED COUNT: 14.8 % (ref 12.3–15.4)
ERYTHROCYTE [DISTWIDTH] IN BLOOD BY AUTOMATED COUNT: 14.9 % (ref 12.3–15.4)
ERYTHROCYTE [DISTWIDTH] IN BLOOD BY AUTOMATED COUNT: 15 % (ref 12.3–15.4)
ERYTHROCYTE [DISTWIDTH] IN BLOOD BY AUTOMATED COUNT: 15.1 % (ref 12.3–15.4)
ERYTHROCYTE [DISTWIDTH] IN BLOOD BY AUTOMATED COUNT: 15.3 % (ref 12.3–15.4)
ERYTHROCYTE [DISTWIDTH] IN BLOOD BY AUTOMATED COUNT: 15.4 % (ref 12.3–15.4)
ERYTHROCYTE [DISTWIDTH] IN BLOOD BY AUTOMATED COUNT: 15.5 % (ref 12.3–15.4)
ERYTHROCYTE [DISTWIDTH] IN BLOOD BY AUTOMATED COUNT: 15.6 % (ref 12.3–15.4)
ERYTHROCYTE [DISTWIDTH] IN BLOOD BY AUTOMATED COUNT: 15.6 % (ref 12.3–15.4)
ERYTHROCYTE [DISTWIDTH] IN BLOOD BY AUTOMATED COUNT: 15.8 % (ref 12.3–15.4)
ERYTHROCYTE [DISTWIDTH] IN BLOOD BY AUTOMATED COUNT: 15.9 % (ref 12.3–15.4)
ERYTHROCYTE [DISTWIDTH] IN BLOOD BY AUTOMATED COUNT: 15.9 % (ref 12.3–15.4)
ERYTHROCYTE [DISTWIDTH] IN BLOOD BY AUTOMATED COUNT: 16 % (ref 12.3–15.4)
ERYTHROCYTE [DISTWIDTH] IN BLOOD BY AUTOMATED COUNT: 16 % (ref 12.3–15.4)
ERYTHROCYTE [DISTWIDTH] IN BLOOD BY AUTOMATED COUNT: 16.3 % (ref 12.3–15.4)
ERYTHROCYTE [DISTWIDTH] IN BLOOD BY AUTOMATED COUNT: 16.4 % (ref 12.3–15.4)
ERYTHROCYTE [DISTWIDTH] IN BLOOD BY AUTOMATED COUNT: 16.4 % (ref 12.3–15.4)
ERYTHROCYTE [DISTWIDTH] IN BLOOD BY AUTOMATED COUNT: 16.6 % (ref 12.3–15.4)
ERYTHROCYTE [DISTWIDTH] IN BLOOD BY AUTOMATED COUNT: 16.7 % (ref 12.3–15.4)
ERYTHROCYTE [DISTWIDTH] IN BLOOD BY AUTOMATED COUNT: 16.7 % (ref 12.3–15.4)
ERYTHROCYTE [DISTWIDTH] IN BLOOD BY AUTOMATED COUNT: 16.8 % (ref 12.3–15.4)
ERYTHROCYTE [DISTWIDTH] IN BLOOD BY AUTOMATED COUNT: 16.9 % (ref 12.3–15.4)
ERYTHROCYTE [DISTWIDTH] IN BLOOD BY AUTOMATED COUNT: 17.1 % (ref 12.3–15.4)
ERYTHROCYTE [DISTWIDTH] IN BLOOD BY AUTOMATED COUNT: 17.1 % (ref 12.3–15.4)
ERYTHROCYTE [DISTWIDTH] IN BLOOD BY AUTOMATED COUNT: 17.2 % (ref 12.3–15.4)
FERRITIN SERPL-MCNC: 132 NG/ML (ref 17.9–464)
FLUAV H1 2009 PAND RNA NPH QL NAA+PROBE: NOT DETECTED
FLUAV H1 HA GENE NPH QL NAA+PROBE: NOT DETECTED
FLUAV H3 RNA NPH QL NAA+PROBE: NOT DETECTED
FLUAV SUBTYP SPEC NAA+PROBE: NOT DETECTED
FLUBV RNA ISLT QL NAA+PROBE: NOT DETECTED
GFR SERPL CREATININE-BSD FRML MDRD: 22 ML/MIN/1.73
GFR SERPL CREATININE-BSD FRML MDRD: 25 ML/MIN/1.73
GFR SERPL CREATININE-BSD FRML MDRD: 25 ML/MIN/1.73
GFR SERPL CREATININE-BSD FRML MDRD: 26 ML/MIN/1.73 (ref 42–98)
GFR SERPL CREATININE-BSD FRML MDRD: 27 ML/MIN/1.73
GFR SERPL CREATININE-BSD FRML MDRD: 27 ML/MIN/1.73 (ref 42–98)
GFR SERPL CREATININE-BSD FRML MDRD: 28 ML/MIN/1.73 (ref 42–98)
GFR SERPL CREATININE-BSD FRML MDRD: 30 ML/MIN/1.73
GFR SERPL CREATININE-BSD FRML MDRD: 30 ML/MIN/1.73
GFR SERPL CREATININE-BSD FRML MDRD: 36 ML/MIN/1.73
GFR SERPL CREATININE-BSD FRML MDRD: 37 ML/MIN/1.73 (ref 42–98)
GFR SERPL CREATININE-BSD FRML MDRD: 40 ML/MIN/1.73 (ref 42–98)
GFR SERPL CREATININE-BSD FRML MDRD: 40 ML/MIN/1.73 (ref 42–98)
GFR SERPL CREATININE-BSD FRML MDRD: 41 ML/MIN/1.73 (ref 42–98)
GFR SERPL CREATININE-BSD FRML MDRD: 42 ML/MIN/1.73
GFR SERPL CREATININE-BSD FRML MDRD: 43 ML/MIN/1.73 (ref 42–98)
GFR SERPL CREATININE-BSD FRML MDRD: 44 ML/MIN/1.73
GFR SERPL CREATININE-BSD FRML MDRD: 44 ML/MIN/1.73 (ref 42–98)
GFR SERPL CREATININE-BSD FRML MDRD: 46 ML/MIN/1.73
GFR SERPL CREATININE-BSD FRML MDRD: 46 ML/MIN/1.73 (ref 42–98)
GFR SERPL CREATININE-BSD FRML MDRD: 47 ML/MIN/1.73
GFR SERPL CREATININE-BSD FRML MDRD: 47 ML/MIN/1.73
GFR SERPL CREATININE-BSD FRML MDRD: 47 ML/MIN/1.73 (ref 42–98)
GFR SERPL CREATININE-BSD FRML MDRD: 47 ML/MIN/1.73 (ref 42–98)
GFR SERPL CREATININE-BSD FRML MDRD: 49 ML/MIN/1.73
GFR SERPL CREATININE-BSD FRML MDRD: 51 ML/MIN/1.73
GFR SERPL CREATININE-BSD FRML MDRD: 52 ML/MIN/1.73
GFR SERPL CREATININE-BSD FRML MDRD: 52 ML/MIN/1.73 (ref 42–98)
GFR SERPL CREATININE-BSD FRML MDRD: 53 ML/MIN/1.73
GFR SERPL CREATININE-BSD FRML MDRD: 53 ML/MIN/1.73
GLOBULIN UR ELPH-MCNC: 2.4 GM/DL (ref 2.3–3.5)
GLOBULIN UR ELPH-MCNC: 2.5 GM/DL
GLOBULIN UR ELPH-MCNC: 3 GM/DL
GLUCOSE BLD-MCNC: 102 MG/DL (ref 60–100)
GLUCOSE BLD-MCNC: 102 MG/DL (ref 60–100)
GLUCOSE BLD-MCNC: 104 MG/DL (ref 60–100)
GLUCOSE BLD-MCNC: 105 MG/DL (ref 65–99)
GLUCOSE BLD-MCNC: 107 MG/DL (ref 65–99)
GLUCOSE BLD-MCNC: 111 MG/DL (ref 65–99)
GLUCOSE BLD-MCNC: 162 MG/DL (ref 65–99)
GLUCOSE BLD-MCNC: 70 MG/DL (ref 65–99)
GLUCOSE BLD-MCNC: 74 MG/DL (ref 60–100)
GLUCOSE BLD-MCNC: 74 MG/DL (ref 65–99)
GLUCOSE BLD-MCNC: 76 MG/DL (ref 65–99)
GLUCOSE BLD-MCNC: 77 MG/DL (ref 60–100)
GLUCOSE BLD-MCNC: 79 MG/DL (ref 60–100)
GLUCOSE BLD-MCNC: 81 MG/DL (ref 65–99)
GLUCOSE BLD-MCNC: 83 MG/DL (ref 60–100)
GLUCOSE BLD-MCNC: 83 MG/DL (ref 65–99)
GLUCOSE BLD-MCNC: 84 MG/DL (ref 60–100)
GLUCOSE BLD-MCNC: 87 MG/DL (ref 65–99)
GLUCOSE BLD-MCNC: 88 MG/DL (ref 60–100)
GLUCOSE BLD-MCNC: 88 MG/DL (ref 65–99)
GLUCOSE BLD-MCNC: 88 MG/DL (ref 65–99)
GLUCOSE BLD-MCNC: 89 MG/DL (ref 60–100)
GLUCOSE BLD-MCNC: 91 MG/DL (ref 60–100)
GLUCOSE BLD-MCNC: 91 MG/DL (ref 65–99)
GLUCOSE BLD-MCNC: 93 MG/DL (ref 60–100)
GLUCOSE BLD-MCNC: 93 MG/DL (ref 65–99)
GLUCOSE BLD-MCNC: 96 MG/DL (ref 65–99)
GLUCOSE BLD-MCNC: 96 MG/DL (ref 65–99)
GLUCOSE BLD-MCNC: 97 MG/DL (ref 60–100)
GLUCOSE BLD-MCNC: 99 MG/DL (ref 65–99)
GLUCOSE BLDC GLUCOMTR-MCNC: 103 MG/DL (ref 70–130)
GLUCOSE BLDC GLUCOMTR-MCNC: 105 MG/DL (ref 70–130)
GLUCOSE BLDC GLUCOMTR-MCNC: 118 MG/DL (ref 70–130)
GLUCOSE BLDC GLUCOMTR-MCNC: 120 MG/DL (ref 70–130)
GLUCOSE BLDC GLUCOMTR-MCNC: 123 MG/DL (ref 70–130)
GLUCOSE BLDC GLUCOMTR-MCNC: 125 MG/DL (ref 70–130)
GLUCOSE BLDC GLUCOMTR-MCNC: 126 MG/DL (ref 70–130)
GLUCOSE BLDC GLUCOMTR-MCNC: 142 MG/DL (ref 70–130)
GLUCOSE BLDC GLUCOMTR-MCNC: 143 MG/DL (ref 70–130)
GLUCOSE BLDC GLUCOMTR-MCNC: 144 MG/DL (ref 70–130)
GLUCOSE BLDC GLUCOMTR-MCNC: 146 MG/DL (ref 70–130)
GLUCOSE BLDC GLUCOMTR-MCNC: 147 MG/DL (ref 70–130)
GLUCOSE BLDC GLUCOMTR-MCNC: 156 MG/DL (ref 70–130)
GLUCOSE BLDC GLUCOMTR-MCNC: 161 MG/DL (ref 70–130)
GLUCOSE BLDC GLUCOMTR-MCNC: 51 MG/DL (ref 70–130)
GLUCOSE BLDC GLUCOMTR-MCNC: 62 MG/DL (ref 70–130)
GLUCOSE BLDC GLUCOMTR-MCNC: 65 MG/DL (ref 70–130)
GLUCOSE BLDC GLUCOMTR-MCNC: 66 MG/DL (ref 70–130)
GLUCOSE BLDC GLUCOMTR-MCNC: 72 MG/DL (ref 70–130)
GLUCOSE UR STRIP-MCNC: NEGATIVE MG/DL
GRAM STN SPEC: ABNORMAL
GRAM STN SPEC: ABNORMAL
GRAM STN SPEC: NORMAL
HADV DNA SPEC NAA+PROBE: NOT DETECTED
HANSEL STAIN: POSITIVE
HCOV 229E RNA SPEC QL NAA+PROBE: NOT DETECTED
HCOV HKU1 RNA SPEC QL NAA+PROBE: NOT DETECTED
HCOV NL63 RNA SPEC QL NAA+PROBE: NOT DETECTED
HCOV OC43 RNA SPEC QL NAA+PROBE: NOT DETECTED
HCT VFR BLD AUTO: 22.4 % (ref 37.5–51)
HCT VFR BLD AUTO: 22.5 % (ref 37.5–51)
HCT VFR BLD AUTO: 22.7 % (ref 37.5–51)
HCT VFR BLD AUTO: 22.7 % (ref 37.5–51)
HCT VFR BLD AUTO: 22.8 % (ref 37.5–51)
HCT VFR BLD AUTO: 23.1 % (ref 37.5–51)
HCT VFR BLD AUTO: 23.3 % (ref 37.5–51)
HCT VFR BLD AUTO: 23.9 % (ref 37.5–51)
HCT VFR BLD AUTO: 24.5 % (ref 37.5–51)
HCT VFR BLD AUTO: 24.6 % (ref 37.5–51)
HCT VFR BLD AUTO: 24.7 % (ref 37.5–51)
HCT VFR BLD AUTO: 24.8 % (ref 37.5–51)
HCT VFR BLD AUTO: 25 % (ref 37.5–51)
HCT VFR BLD AUTO: 25.1 % (ref 37.5–51)
HCT VFR BLD AUTO: 25.1 % (ref 37.5–51)
HCT VFR BLD AUTO: 25.4 % (ref 37.5–51)
HCT VFR BLD AUTO: 25.5 % (ref 37.5–51)
HCT VFR BLD AUTO: 25.6 % (ref 37.5–51)
HCT VFR BLD AUTO: 25.6 % (ref 37.5–51)
HCT VFR BLD AUTO: 26 % (ref 37.5–51)
HCT VFR BLD AUTO: 26.3 % (ref 37.5–51)
HCT VFR BLD AUTO: 26.6 % (ref 37.5–51)
HCT VFR BLD AUTO: 26.8 % (ref 37.5–51)
HCT VFR BLD AUTO: 27.1 % (ref 37.5–51)
HCT VFR BLD AUTO: 27.2 % (ref 37.5–51)
HCT VFR BLD AUTO: 27.5 % (ref 37.5–51)
HCT VFR BLD AUTO: 27.5 % (ref 37.5–51)
HCT VFR BLD AUTO: 27.9 % (ref 37.5–51)
HCT VFR BLD AUTO: 28.2 % (ref 37.5–51)
HCT VFR BLD AUTO: 28.7 % (ref 37.5–51)
HGB BLD-MCNC: 6.8 G/DL (ref 13–17.7)
HGB BLD-MCNC: 7 G/DL (ref 13–17.7)
HGB BLD-MCNC: 7.1 G/DL (ref 13–17.7)
HGB BLD-MCNC: 7.1 G/DL (ref 13–17.7)
HGB BLD-MCNC: 7.2 G/DL (ref 13–17.7)
HGB BLD-MCNC: 7.2 G/DL (ref 13–17.7)
HGB BLD-MCNC: 7.3 G/DL (ref 13–17.7)
HGB BLD-MCNC: 7.3 G/DL (ref 13–17.7)
HGB BLD-MCNC: 7.4 G/DL (ref 13–17.7)
HGB BLD-MCNC: 7.4 G/DL (ref 13–17.7)
HGB BLD-MCNC: 7.5 G/DL (ref 13–17.7)
HGB BLD-MCNC: 7.5 G/DL (ref 13–17.7)
HGB BLD-MCNC: 7.6 G/DL (ref 13–17.7)
HGB BLD-MCNC: 7.7 G/DL (ref 13–17.7)
HGB BLD-MCNC: 7.7 G/DL (ref 13–17.7)
HGB BLD-MCNC: 7.8 G/DL (ref 13–17.7)
HGB BLD-MCNC: 7.8 G/DL (ref 13–17.7)
HGB BLD-MCNC: 7.9 G/DL (ref 13–17.7)
HGB BLD-MCNC: 8 G/DL (ref 13–17.7)
HGB BLD-MCNC: 8.1 G/DL (ref 13–17.7)
HGB BLD-MCNC: 8.1 G/DL (ref 13–17.7)
HGB BLD-MCNC: 8.3 G/DL (ref 13–17.7)
HGB BLD-MCNC: 8.3 G/DL (ref 13–17.7)
HGB BLD-MCNC: 8.4 G/DL (ref 13–17.7)
HGB BLD-MCNC: 8.5 G/DL (ref 13–17.7)
HGB BLD-MCNC: 8.6 G/DL (ref 13–17.7)
HGB BLD-MCNC: 8.7 G/DL (ref 13–17.7)
HGB BLD-MCNC: 8.7 G/DL (ref 13–17.7)
HGB BLD-MCNC: 9.1 G/DL (ref 13–17.7)
HGB BLD-MCNC: 9.4 G/DL (ref 13–17.7)
HGB UR QL STRIP.AUTO: ABNORMAL
HMPV RNA NPH QL NAA+NON-PROBE: NOT DETECTED
HOLD SPECIMEN: NORMAL
HPIV1 RNA SPEC QL NAA+PROBE: NOT DETECTED
HPIV2 RNA SPEC QL NAA+PROBE: NOT DETECTED
HPIV3 RNA NPH QL NAA+PROBE: NOT DETECTED
HPIV4 P GENE NPH QL NAA+PROBE: NOT DETECTED
HYALINE CASTS UR QL AUTO: ABNORMAL /LPF
IMM GRANULOCYTES # BLD AUTO: 0.03 10*3/MM3 (ref 0–0.05)
IMM GRANULOCYTES # BLD AUTO: 0.03 10*3/MM3 (ref 0–0.05)
IMM GRANULOCYTES # BLD AUTO: 0.04 10*3/MM3 (ref 0–0.05)
IMM GRANULOCYTES # BLD AUTO: 0.05 10*3/MM3 (ref 0–0.05)
IMM GRANULOCYTES # BLD AUTO: 0.06 10*3/MM3 (ref 0–0.05)
IMM GRANULOCYTES # BLD AUTO: 0.07 10*3/MM3 (ref 0–0.05)
IMM GRANULOCYTES # BLD AUTO: 0.08 10*3/MM3 (ref 0–0.05)
IMM GRANULOCYTES # BLD AUTO: 0.09 10*3/MM3 (ref 0–0.05)
IMM GRANULOCYTES # BLD AUTO: 0.1 10*3/MM3 (ref 0–0.05)
IMM GRANULOCYTES # BLD AUTO: 0.11 10*3/MM3 (ref 0–0.05)
IMM GRANULOCYTES NFR BLD AUTO: 0.3 % (ref 0–0.5)
IMM GRANULOCYTES NFR BLD AUTO: 0.4 % (ref 0–0.5)
IMM GRANULOCYTES NFR BLD AUTO: 0.5 % (ref 0–0.5)
IMM GRANULOCYTES NFR BLD AUTO: 0.6 % (ref 0–0.5)
IMM GRANULOCYTES NFR BLD AUTO: 0.7 % (ref 0–0.5)
INR PPP: 1.32 (ref 0.8–1.2)
INR PPP: 1.36 (ref 0.8–1.2)
INR PPP: 1.38 (ref 0.8–1.2)
INR PPP: 1.45 (ref 0.8–1.2)
INR PPP: 1.47 (ref 0.8–1.2)
INR PPP: 1.47 (ref 0.8–1.2)
INR PPP: 1.66 (ref 0.8–1.2)
INR PPP: 1.69 (ref 0.8–1.2)
INR PPP: 1.73 (ref 0.8–1.2)
INR PPP: 1.84 (ref 0.8–1.2)
INR PPP: 1.9 (ref 0.8–1.2)
INR PPP: 1.92 (ref 0.8–1.2)
INR PPP: 2.03 (ref 0.8–1.2)
INR PPP: 2.17 (ref 0.8–1.2)
INR PPP: 2.22 (ref 0.8–1.2)
INR PPP: 2.29 (ref 0.8–1.2)
INR PPP: 2.3 (ref 0.9–1.1)
INR PPP: 2.32 (ref 0.8–1.2)
INR PPP: 2.4 (ref 0.9–1.1)
INR PPP: 2.42 (ref 0.8–1.2)
INR PPP: 2.6 (ref 0.8–1.2)
INR PPP: 2.6 (ref 0.8–1.2)
INR PPP: 2.66 (ref 0.8–1.2)
INR PPP: 2.75 (ref 0.8–1.2)
INR PPP: 2.84 (ref 0.8–1.2)
INR PPP: 3.05 (ref 0.8–1.2)
INR PPP: 3.11 (ref 0.8–1.2)
INR PPP: 3.11 (ref 0.8–1.2)
INR PPP: 3.27 (ref 0.8–1.2)
INR PPP: 3.3 (ref 0.9–1.1)
INR PPP: 3.38 (ref 0.8–1.2)
INR PPP: 4.73 (ref 0.8–1.2)
INR PPP: 4.85 (ref 0.8–1.2)
INR PPP: 7.75 (ref 0.8–1.2)
INR PPP: 8.99 (ref 0.8–1.2)
IRON 24H UR-MRATE: <10 MCG/DL (ref 49–181)
IRON SATN MFR SERPL: ABNORMAL % (ref 20–55)
ISOLATED FROM: ABNORMAL
KETONES UR QL STRIP: ABNORMAL
KETONES UR QL STRIP: ABNORMAL
KETONES UR QL STRIP: NEGATIVE
KETONES UR QL STRIP: NEGATIVE
L PNEUMO1 AG UR QL IA: NEGATIVE
LAB AP CASE REPORT: NORMAL
LAB AP CASE REPORT: NORMAL
LEUKOCYTE ESTERASE UR QL STRIP.AUTO: ABNORMAL
LEUKOCYTE ESTERASE UR QL STRIP.AUTO: NEGATIVE
LV EF 2D ECHO EST: 65 %
LYMPHOCYTES # BLD AUTO: 0.14 10*3/MM3 (ref 0.7–3.1)
LYMPHOCYTES # BLD AUTO: 0.16 10*3/MM3 (ref 0.7–3.1)
LYMPHOCYTES # BLD AUTO: 0.22 10*3/MM3 (ref 0.7–3.1)
LYMPHOCYTES # BLD AUTO: 0.24 10*3/MM3 (ref 0.7–3.1)
LYMPHOCYTES # BLD AUTO: 0.25 10*3/MM3 (ref 0.7–3.1)
LYMPHOCYTES # BLD AUTO: 0.26 10*3/MM3 (ref 0.7–3.1)
LYMPHOCYTES # BLD AUTO: 0.28 10*3/MM3 (ref 0.7–3.1)
LYMPHOCYTES # BLD AUTO: 0.31 10*3/MM3 (ref 0.7–3.1)
LYMPHOCYTES # BLD AUTO: 0.33 10*3/MM3 (ref 0.7–3.1)
LYMPHOCYTES # BLD AUTO: 0.34 10*3/MM3 (ref 0.7–3.1)
LYMPHOCYTES # BLD AUTO: 0.39 10*3/MM3 (ref 0.7–3.1)
LYMPHOCYTES # BLD AUTO: 0.39 10*3/MM3 (ref 0.7–3.1)
LYMPHOCYTES # BLD AUTO: 0.41 10*3/MM3 (ref 0.7–3.1)
LYMPHOCYTES # BLD AUTO: 0.41 10*3/MM3 (ref 0.7–3.1)
LYMPHOCYTES # BLD AUTO: 0.44 10*3/MM3 (ref 0.7–3.1)
LYMPHOCYTES # BLD AUTO: 0.44 10*3/MM3 (ref 0.7–3.1)
LYMPHOCYTES # BLD AUTO: 0.49 10*3/MM3 (ref 0.7–3.1)
LYMPHOCYTES # BLD AUTO: 0.52 10*3/MM3 (ref 0.7–3.1)
LYMPHOCYTES # BLD AUTO: 0.53 10*3/MM3 (ref 0.7–3.1)
LYMPHOCYTES # BLD AUTO: 0.56 10*3/MM3 (ref 0.7–3.1)
LYMPHOCYTES # BLD AUTO: 0.58 10*3/MM3 (ref 0.7–3.1)
LYMPHOCYTES # BLD AUTO: 0.59 10*3/MM3 (ref 0.7–3.1)
LYMPHOCYTES # BLD AUTO: 0.63 10*3/MM3 (ref 0.7–3.1)
LYMPHOCYTES # BLD AUTO: 0.64 10*3/MM3 (ref 0.7–3.1)
LYMPHOCYTES # BLD AUTO: 0.69 10*3/MM3 (ref 0.7–3.1)
LYMPHOCYTES # BLD AUTO: 0.71 10*3/MM3 (ref 0.7–3.1)
LYMPHOCYTES # BLD AUTO: 0.77 10*3/MM3 (ref 0.7–3.1)
LYMPHOCYTES # BLD MANUAL: 0.43 10*3/MM3 (ref 0.7–3.1)
LYMPHOCYTES NFR BLD AUTO: 1.1 % (ref 19.6–45.3)
LYMPHOCYTES NFR BLD AUTO: 1.3 % (ref 19.6–45.3)
LYMPHOCYTES NFR BLD AUTO: 1.7 % (ref 19.6–45.3)
LYMPHOCYTES NFR BLD AUTO: 1.9 % (ref 19.6–45.3)
LYMPHOCYTES NFR BLD AUTO: 2.2 % (ref 19.6–45.3)
LYMPHOCYTES NFR BLD AUTO: 2.3 % (ref 19.6–45.3)
LYMPHOCYTES NFR BLD AUTO: 2.5 % (ref 19.6–45.3)
LYMPHOCYTES NFR BLD AUTO: 2.6 % (ref 19.6–45.3)
LYMPHOCYTES NFR BLD AUTO: 2.8 % (ref 19.6–45.3)
LYMPHOCYTES NFR BLD AUTO: 3.3 % (ref 19.6–45.3)
LYMPHOCYTES NFR BLD AUTO: 3.7 % (ref 19.6–45.3)
LYMPHOCYTES NFR BLD AUTO: 3.8 % (ref 19.6–45.3)
LYMPHOCYTES NFR BLD AUTO: 3.8 % (ref 19.6–45.3)
LYMPHOCYTES NFR BLD AUTO: 3.9 % (ref 19.6–45.3)
LYMPHOCYTES NFR BLD AUTO: 4 % (ref 19.6–45.3)
LYMPHOCYTES NFR BLD AUTO: 4.1 % (ref 19.6–45.3)
LYMPHOCYTES NFR BLD AUTO: 4.3 % (ref 19.6–45.3)
LYMPHOCYTES NFR BLD AUTO: 4.8 % (ref 19.6–45.3)
LYMPHOCYTES NFR BLD AUTO: 4.8 % (ref 19.6–45.3)
LYMPHOCYTES NFR BLD AUTO: 4.9 % (ref 19.6–45.3)
LYMPHOCYTES NFR BLD AUTO: 5.2 % (ref 19.6–45.3)
LYMPHOCYTES NFR BLD AUTO: 5.2 % (ref 19.6–45.3)
LYMPHOCYTES NFR BLD AUTO: 5.8 % (ref 19.6–45.3)
LYMPHOCYTES NFR BLD AUTO: 6 % (ref 19.6–45.3)
LYMPHOCYTES NFR BLD AUTO: 6 % (ref 19.6–45.3)
LYMPHOCYTES NFR BLD AUTO: 6.6 % (ref 19.6–45.3)
LYMPHOCYTES NFR BLD AUTO: 6.6 % (ref 19.6–45.3)
LYMPHOCYTES NFR BLD AUTO: 7.3 % (ref 19.6–45.3)
LYMPHOCYTES NFR BLD AUTO: 7.5 % (ref 19.6–45.3)
LYMPHOCYTES NFR BLD MANUAL: 3 % (ref 5–12)
LYMPHOCYTES NFR BLD MANUAL: 4 % (ref 19.6–45.3)
Lab: NORMAL
Lab: NORMAL
M PNEUMO IGG SER IA-ACNC: NOT DETECTED
MAGNESIUM SERPL-MCNC: 2.3 MG/DL (ref 1.6–2.3)
MAGNESIUM SERPL-MCNC: 2.4 MG/DL (ref 1.6–2.3)
MAXIMAL PREDICTED HEART RATE: 138 BPM
MCH RBC QN AUTO: 26.8 PG (ref 26.6–33)
MCH RBC QN AUTO: 27 PG (ref 26.6–33)
MCH RBC QN AUTO: 27.4 PG (ref 26.6–33)
MCH RBC QN AUTO: 27.5 PG (ref 26.6–33)
MCH RBC QN AUTO: 27.6 PG (ref 26.6–33)
MCH RBC QN AUTO: 27.7 PG (ref 26.6–33)
MCH RBC QN AUTO: 27.8 PG (ref 26.6–33)
MCH RBC QN AUTO: 27.9 PG (ref 26.6–33)
MCH RBC QN AUTO: 28 PG (ref 26.6–33)
MCH RBC QN AUTO: 28.1 PG (ref 26.6–33)
MCH RBC QN AUTO: 28.2 PG (ref 26.6–33)
MCH RBC QN AUTO: 28.4 PG (ref 26.6–33)
MCH RBC QN AUTO: 28.5 PG (ref 26.6–33)
MCH RBC QN AUTO: 28.5 PG (ref 26.6–33)
MCH RBC QN AUTO: 28.7 PG (ref 26.6–33)
MCHC RBC AUTO-ENTMCNC: 29.9 G/DL (ref 31.5–35.7)
MCHC RBC AUTO-ENTMCNC: 29.9 G/DL (ref 31.5–35.7)
MCHC RBC AUTO-ENTMCNC: 30 G/DL (ref 31.5–35.7)
MCHC RBC AUTO-ENTMCNC: 30.4 G/DL (ref 31.5–35.7)
MCHC RBC AUTO-ENTMCNC: 30.5 G/DL (ref 31.5–35.7)
MCHC RBC AUTO-ENTMCNC: 30.6 G/DL (ref 31.5–35.7)
MCHC RBC AUTO-ENTMCNC: 30.7 G/DL (ref 31.5–35.7)
MCHC RBC AUTO-ENTMCNC: 30.8 G/DL (ref 31.5–35.7)
MCHC RBC AUTO-ENTMCNC: 30.9 G/DL (ref 31.5–35.7)
MCHC RBC AUTO-ENTMCNC: 30.9 G/DL (ref 31.5–35.7)
MCHC RBC AUTO-ENTMCNC: 31 G/DL (ref 31.5–35.7)
MCHC RBC AUTO-ENTMCNC: 31.1 G/DL (ref 31.5–35.7)
MCHC RBC AUTO-ENTMCNC: 31.1 G/DL (ref 31.5–35.7)
MCHC RBC AUTO-ENTMCNC: 31.2 G/DL (ref 31.5–35.7)
MCHC RBC AUTO-ENTMCNC: 31.4 G/DL (ref 31.5–35.7)
MCHC RBC AUTO-ENTMCNC: 31.6 G/DL (ref 31.5–35.7)
MCHC RBC AUTO-ENTMCNC: 31.6 G/DL (ref 31.5–35.7)
MCHC RBC AUTO-ENTMCNC: 31.7 G/DL (ref 31.5–35.7)
MCHC RBC AUTO-ENTMCNC: 32.3 G/DL (ref 31.5–35.7)
MCHC RBC AUTO-ENTMCNC: 32.7 G/DL (ref 31.5–35.7)
MCHC RBC AUTO-ENTMCNC: 32.8 G/DL (ref 31.5–35.7)
MCV RBC AUTO: 87 FL (ref 79–97)
MCV RBC AUTO: 87.8 FL (ref 79–97)
MCV RBC AUTO: 87.8 FL (ref 79–97)
MCV RBC AUTO: 88.1 FL (ref 79–97)
MCV RBC AUTO: 88.1 FL (ref 79–97)
MCV RBC AUTO: 88.3 FL (ref 79–97)
MCV RBC AUTO: 88.3 FL (ref 79–97)
MCV RBC AUTO: 88.5 FL (ref 79–97)
MCV RBC AUTO: 88.8 FL (ref 79–97)
MCV RBC AUTO: 89 FL (ref 79–97)
MCV RBC AUTO: 89 FL (ref 79–97)
MCV RBC AUTO: 89.4 FL (ref 79–97)
MCV RBC AUTO: 89.5 FL (ref 79–97)
MCV RBC AUTO: 89.6 FL (ref 79–97)
MCV RBC AUTO: 89.6 FL (ref 79–97)
MCV RBC AUTO: 89.8 FL (ref 79–97)
MCV RBC AUTO: 89.8 FL (ref 79–97)
MCV RBC AUTO: 90.1 FL (ref 79–97)
MCV RBC AUTO: 90.2 FL (ref 79–97)
MCV RBC AUTO: 90.3 FL (ref 79–97)
MCV RBC AUTO: 90.4 FL (ref 79–97)
MCV RBC AUTO: 90.5 FL (ref 79–97)
MCV RBC AUTO: 90.7 FL (ref 79–97)
MCV RBC AUTO: 90.9 FL (ref 79–97)
MCV RBC AUTO: 91.4 FL (ref 79–97)
MCV RBC AUTO: 91.9 FL (ref 79–97)
MONOCYTES # BLD AUTO: 0.07 10*3/MM3 (ref 0.1–0.9)
MONOCYTES # BLD AUTO: 0.32 10*3/MM3 (ref 0.1–0.9)
MONOCYTES # BLD AUTO: 0.32 10*3/MM3 (ref 0.1–0.9)
MONOCYTES # BLD AUTO: 0.5 10*3/MM3 (ref 0.1–0.9)
MONOCYTES # BLD AUTO: 0.51 10*3/MM3 (ref 0.1–0.9)
MONOCYTES # BLD AUTO: 0.52 10*3/MM3 (ref 0.1–0.9)
MONOCYTES # BLD AUTO: 0.53 10*3/MM3 (ref 0.1–0.9)
MONOCYTES # BLD AUTO: 0.55 10*3/MM3 (ref 0.1–0.9)
MONOCYTES # BLD AUTO: 0.58 10*3/MM3 (ref 0.1–0.9)
MONOCYTES # BLD AUTO: 0.62 10*3/MM3 (ref 0.1–0.9)
MONOCYTES # BLD AUTO: 0.67 10*3/MM3 (ref 0.1–0.9)
MONOCYTES # BLD AUTO: 0.69 10*3/MM3 (ref 0.1–0.9)
MONOCYTES # BLD AUTO: 0.71 10*3/MM3 (ref 0.1–0.9)
MONOCYTES # BLD AUTO: 0.72 10*3/MM3 (ref 0.1–0.9)
MONOCYTES # BLD AUTO: 0.74 10*3/MM3 (ref 0.1–0.9)
MONOCYTES # BLD AUTO: 0.77 10*3/MM3 (ref 0.1–0.9)
MONOCYTES # BLD AUTO: 0.77 10*3/MM3 (ref 0.1–0.9)
MONOCYTES # BLD AUTO: 0.79 10*3/MM3 (ref 0.1–0.9)
MONOCYTES # BLD AUTO: 0.79 10*3/MM3 (ref 0.1–0.9)
MONOCYTES # BLD AUTO: 0.82 10*3/MM3 (ref 0.1–0.9)
MONOCYTES # BLD AUTO: 0.83 10*3/MM3 (ref 0.1–0.9)
MONOCYTES # BLD AUTO: 0.84 10*3/MM3 (ref 0.1–0.9)
MONOCYTES # BLD AUTO: 0.89 10*3/MM3 (ref 0.1–0.9)
MONOCYTES # BLD AUTO: 0.98 10*3/MM3 (ref 0.1–0.9)
MONOCYTES # BLD AUTO: 0.98 10*3/MM3 (ref 0.1–0.9)
MONOCYTES # BLD AUTO: 0.99 10*3/MM3 (ref 0.1–0.9)
MONOCYTES # BLD AUTO: 1.06 10*3/MM3 (ref 0.1–0.9)
MONOCYTES # BLD AUTO: 1.15 10*3/MM3 (ref 0.1–0.9)
MONOCYTES NFR BLD AUTO: 0.9 % (ref 5–12)
MONOCYTES NFR BLD AUTO: 10 % (ref 5–12)
MONOCYTES NFR BLD AUTO: 10.3 % (ref 5–12)
MONOCYTES NFR BLD AUTO: 3 % (ref 5–12)
MONOCYTES NFR BLD AUTO: 4 % (ref 5–12)
MONOCYTES NFR BLD AUTO: 4.7 % (ref 5–12)
MONOCYTES NFR BLD AUTO: 5 % (ref 5–12)
MONOCYTES NFR BLD AUTO: 5 % (ref 5–12)
MONOCYTES NFR BLD AUTO: 5.1 % (ref 5–12)
MONOCYTES NFR BLD AUTO: 5.1 % (ref 5–12)
MONOCYTES NFR BLD AUTO: 5.7 % (ref 5–12)
MONOCYTES NFR BLD AUTO: 5.7 % (ref 5–12)
MONOCYTES NFR BLD AUTO: 5.8 % (ref 5–12)
MONOCYTES NFR BLD AUTO: 6.1 % (ref 5–12)
MONOCYTES NFR BLD AUTO: 6.4 % (ref 5–12)
MONOCYTES NFR BLD AUTO: 6.7 % (ref 5–12)
MONOCYTES NFR BLD AUTO: 6.7 % (ref 5–12)
MONOCYTES NFR BLD AUTO: 6.8 % (ref 5–12)
MONOCYTES NFR BLD AUTO: 6.9 % (ref 5–12)
MONOCYTES NFR BLD AUTO: 7.1 % (ref 5–12)
MONOCYTES NFR BLD AUTO: 7.5 % (ref 5–12)
MONOCYTES NFR BLD AUTO: 7.9 % (ref 5–12)
MONOCYTES NFR BLD AUTO: 8 % (ref 5–12)
MONOCYTES NFR BLD AUTO: 8.1 % (ref 5–12)
MONOCYTES NFR BLD AUTO: 8.2 % (ref 5–12)
MONOCYTES NFR BLD AUTO: 8.2 % (ref 5–12)
MONOCYTES NFR BLD AUTO: 9.9 % (ref 5–12)
NEUTROPHILS # BLD AUTO: 10.14 10*3/MM3 (ref 1.4–7)
NEUTROPHILS # BLD AUTO: 10.44 10*3/MM3 (ref 1.4–7)
NEUTROPHILS # BLD AUTO: 10.6 10*3/MM3 (ref 1.4–7)
NEUTROPHILS # BLD AUTO: 11.46 10*3/MM3 (ref 1.4–7)
NEUTROPHILS # BLD AUTO: 11.57 10*3/MM3 (ref 1.4–7)
NEUTROPHILS # BLD AUTO: 11.98 10*3/MM3 (ref 1.4–7)
NEUTROPHILS # BLD AUTO: 12.94 10*3/MM3 (ref 1.4–7)
NEUTROPHILS # BLD AUTO: 13.15 10*3/MM3 (ref 1.4–7)
NEUTROPHILS # BLD AUTO: 13.76 10*3/MM3 (ref 1.4–7)
NEUTROPHILS # BLD AUTO: 16.16 10*3/MM3 (ref 1.4–7)
NEUTROPHILS # BLD AUTO: 7.08 10*3/MM3 (ref 1.4–7)
NEUTROPHILS # BLD AUTO: 7.14 10*3/MM3 (ref 1.4–7)
NEUTROPHILS # BLD AUTO: 7.51 10*3/MM3 (ref 1.4–7)
NEUTROPHILS # BLD AUTO: 7.86 10*3/MM3 (ref 1.4–7)
NEUTROPHILS # BLD AUTO: 7.98 10*3/MM3 (ref 1.4–7)
NEUTROPHILS # BLD AUTO: 8.1 10*3/MM3 (ref 1.4–7)
NEUTROPHILS # BLD AUTO: 8.39 10*3/MM3 (ref 1.4–7)
NEUTROPHILS # BLD AUTO: 8.54 10*3/MM3 (ref 1.4–7)
NEUTROPHILS # BLD AUTO: 8.67 10*3/MM3 (ref 1.4–7)
NEUTROPHILS # BLD AUTO: 8.7 10*3/MM3 (ref 1.4–7)
NEUTROPHILS # BLD AUTO: 8.92 10*3/MM3 (ref 1.4–7)
NEUTROPHILS # BLD AUTO: 8.93 10*3/MM3 (ref 1.4–7)
NEUTROPHILS # BLD AUTO: 9.02 10*3/MM3 (ref 1.4–7)
NEUTROPHILS # BLD AUTO: 9.05 10*3/MM3 (ref 1.4–7)
NEUTROPHILS # BLD AUTO: 9.11 10*3/MM3 (ref 1.4–7)
NEUTROPHILS # BLD AUTO: 9.24 10*3/MM3 (ref 1.4–7)
NEUTROPHILS # BLD AUTO: 9.34 10*3/MM3 (ref 1.4–7)
NEUTROPHILS # BLD AUTO: 9.86 10*3/MM3 (ref 1.4–7)
NEUTROPHILS # BLD AUTO: 9.86 10*3/MM3 (ref 1.4–7)
NEUTROPHILS # BLD AUTO: 9.92 10*3/MM3 (ref 1.4–7)
NEUTROPHILS NFR BLD AUTO: 81.4 % (ref 42.7–76)
NEUTROPHILS NFR BLD AUTO: 82.7 % (ref 42.7–76)
NEUTROPHILS NFR BLD AUTO: 83.2 % (ref 42.7–76)
NEUTROPHILS NFR BLD AUTO: 83.7 % (ref 42.7–76)
NEUTROPHILS NFR BLD AUTO: 83.7 % (ref 42.7–76)
NEUTROPHILS NFR BLD AUTO: 84 % (ref 42.7–76)
NEUTROPHILS NFR BLD AUTO: 84.4 % (ref 42.7–76)
NEUTROPHILS NFR BLD AUTO: 84.9 % (ref 42.7–76)
NEUTROPHILS NFR BLD AUTO: 85 % (ref 42.7–76)
NEUTROPHILS NFR BLD AUTO: 85.5 % (ref 42.7–76)
NEUTROPHILS NFR BLD AUTO: 86.4 % (ref 42.7–76)
NEUTROPHILS NFR BLD AUTO: 86.6 % (ref 42.7–76)
NEUTROPHILS NFR BLD AUTO: 87.2 % (ref 42.7–76)
NEUTROPHILS NFR BLD AUTO: 87.3 % (ref 42.7–76)
NEUTROPHILS NFR BLD AUTO: 87.7 % (ref 42.7–76)
NEUTROPHILS NFR BLD AUTO: 88.2 % (ref 42.7–76)
NEUTROPHILS NFR BLD AUTO: 88.5 % (ref 42.7–76)
NEUTROPHILS NFR BLD AUTO: 88.7 % (ref 42.7–76)
NEUTROPHILS NFR BLD AUTO: 88.8 % (ref 42.7–76)
NEUTROPHILS NFR BLD AUTO: 89.2 % (ref 42.7–76)
NEUTROPHILS NFR BLD AUTO: 89.9 % (ref 42.7–76)
NEUTROPHILS NFR BLD AUTO: 90.2 % (ref 42.7–76)
NEUTROPHILS NFR BLD AUTO: 90.3 % (ref 42.7–76)
NEUTROPHILS NFR BLD AUTO: 91.1 % (ref 42.7–76)
NEUTROPHILS NFR BLD AUTO: 91.5 % (ref 42.7–76)
NEUTROPHILS NFR BLD AUTO: 93.1 % (ref 42.7–76)
NEUTROPHILS NFR BLD AUTO: 93.9 % (ref 42.7–76)
NEUTROPHILS NFR BLD AUTO: 94.5 % (ref 42.7–76)
NEUTROPHILS NFR BLD AUTO: 96.7 % (ref 42.7–76)
NEUTROPHILS NFR BLD MANUAL: 91 % (ref 42.7–76)
NITRITE UR QL STRIP: NEGATIVE
NITRITE UR QL STRIP: POSITIVE
NRBC BLD AUTO-RTO: 0 /100 WBC (ref 0–0)
PATH REPORT.FINAL DX SPEC: NORMAL
PATH REPORT.FINAL DX SPEC: NORMAL
PATH REPORT.GROSS SPEC: NORMAL
PATH REPORT.GROSS SPEC: NORMAL
PH UR STRIP.AUTO: 5.5 [PH] (ref 5–9)
PH UR STRIP.AUTO: 6 [PH] (ref 5–9)
PLAT MORPH BLD: NORMAL
PLATELET # BLD AUTO: 148 10*3/MM3 (ref 140–450)
PLATELET # BLD AUTO: 160 10*3/MM3 (ref 140–450)
PLATELET # BLD AUTO: 170 10*3/MM3 (ref 140–450)
PLATELET # BLD AUTO: 172 10*3/MM3 (ref 140–450)
PLATELET # BLD AUTO: 173 10*3/MM3 (ref 140–450)
PLATELET # BLD AUTO: 179 10*3/MM3 (ref 140–450)
PLATELET # BLD AUTO: 196 10*3/MM3 (ref 140–450)
PLATELET # BLD AUTO: 198 10*3/MM3 (ref 140–450)
PLATELET # BLD AUTO: 200 10*3/MM3 (ref 140–450)
PLATELET # BLD AUTO: 203 10*3/MM3 (ref 140–450)
PLATELET # BLD AUTO: 206 10*3/MM3 (ref 140–450)
PLATELET # BLD AUTO: 211 10*3/MM3 (ref 140–450)
PLATELET # BLD AUTO: 214 10*3/MM3 (ref 140–450)
PLATELET # BLD AUTO: 220 10*3/MM3 (ref 140–450)
PLATELET # BLD AUTO: 227 10*3/MM3 (ref 140–450)
PLATELET # BLD AUTO: 229 10*3/MM3 (ref 140–450)
PLATELET # BLD AUTO: 230 10*3/MM3 (ref 140–450)
PLATELET # BLD AUTO: 237 10*3/MM3 (ref 140–450)
PLATELET # BLD AUTO: 240 10*3/MM3 (ref 140–450)
PLATELET # BLD AUTO: 242 10*3/MM3 (ref 140–450)
PLATELET # BLD AUTO: 257 10*3/MM3 (ref 140–450)
PLATELET # BLD AUTO: 260 10*3/MM3 (ref 140–450)
PLATELET # BLD AUTO: 266 10*3/MM3 (ref 140–450)
PLATELET # BLD AUTO: 283 10*3/MM3 (ref 140–450)
PLATELET # BLD AUTO: 304 10*3/MM3 (ref 140–450)
PLATELET # BLD AUTO: 315 10*3/MM3 (ref 140–450)
PLATELET # BLD AUTO: 381 10*3/MM3 (ref 140–450)
PLATELET # BLD AUTO: 397 10*3/MM3 (ref 140–450)
PLATELET # BLD AUTO: 411 10*3/MM3 (ref 140–450)
PLATELET # BLD AUTO: 417 10*3/MM3 (ref 140–450)
PMV BLD AUTO: 10 FL (ref 6–12)
PMV BLD AUTO: 10 FL (ref 6–12)
PMV BLD AUTO: 10.3 FL (ref 6–12)
PMV BLD AUTO: 10.5 FL (ref 6–12)
PMV BLD AUTO: 10.6 FL (ref 6–12)
PMV BLD AUTO: 10.6 FL (ref 6–12)
PMV BLD AUTO: 10.7 FL (ref 6–12)
PMV BLD AUTO: 10.8 FL (ref 6–12)
PMV BLD AUTO: 10.9 FL (ref 6–12)
PMV BLD AUTO: 10.9 FL (ref 6–12)
PMV BLD AUTO: 11 FL (ref 6–12)
PMV BLD AUTO: 11 FL (ref 6–12)
PMV BLD AUTO: 11.1 FL (ref 6–12)
PMV BLD AUTO: 11.3 FL (ref 6–12)
PMV BLD AUTO: 11.4 FL (ref 6–12)
PMV BLD AUTO: 11.6 FL (ref 6–12)
PMV BLD AUTO: 11.6 FL (ref 6–12)
PMV BLD AUTO: 11.7 FL (ref 6–12)
PMV BLD AUTO: 12 FL (ref 6–12)
PMV BLD AUTO: 9.7 FL (ref 6–12)
PMV BLD AUTO: 9.7 FL (ref 6–12)
PMV BLD AUTO: 9.8 FL (ref 6–12)
POTASSIUM BLD-SCNC: 3.4 MMOL/L (ref 3.5–5.1)
POTASSIUM BLD-SCNC: 3.4 MMOL/L (ref 3.5–5.2)
POTASSIUM BLD-SCNC: 3.5 MMOL/L (ref 3.5–5.1)
POTASSIUM BLD-SCNC: 3.5 MMOL/L (ref 3.5–5.2)
POTASSIUM BLD-SCNC: 3.6 MMOL/L (ref 3.5–5.2)
POTASSIUM BLD-SCNC: 3.6 MMOL/L (ref 3.5–5.2)
POTASSIUM BLD-SCNC: 3.7 MMOL/L (ref 3.5–5.1)
POTASSIUM BLD-SCNC: 3.7 MMOL/L (ref 3.5–5.1)
POTASSIUM BLD-SCNC: 3.7 MMOL/L (ref 3.5–5.2)
POTASSIUM BLD-SCNC: 3.8 MMOL/L (ref 3.5–5.1)
POTASSIUM BLD-SCNC: 3.8 MMOL/L (ref 3.5–5.1)
POTASSIUM BLD-SCNC: 3.8 MMOL/L (ref 3.5–5.2)
POTASSIUM BLD-SCNC: 3.8 MMOL/L (ref 3.5–5.2)
POTASSIUM BLD-SCNC: 4 MMOL/L (ref 3.5–5.2)
POTASSIUM BLD-SCNC: 4 MMOL/L (ref 3.5–5.2)
POTASSIUM BLD-SCNC: 4.1 MMOL/L (ref 3.5–5.1)
POTASSIUM BLD-SCNC: 4.1 MMOL/L (ref 3.5–5.2)
POTASSIUM BLD-SCNC: 4.1 MMOL/L (ref 3.5–5.2)
POTASSIUM BLD-SCNC: 4.2 MMOL/L (ref 3.5–5.1)
POTASSIUM BLD-SCNC: 4.2 MMOL/L (ref 3.5–5.2)
POTASSIUM BLD-SCNC: 4.2 MMOL/L (ref 3.5–5.2)
PROT SERPL-MCNC: 5.4 G/DL (ref 6.3–8.6)
PROT SERPL-MCNC: 5.4 G/DL (ref 6–8.5)
PROT SERPL-MCNC: 6.4 G/DL (ref 6–8.5)
PROT UR QL STRIP: ABNORMAL
PROTHROMBIN TIME: 16 SECONDS (ref 11.1–15.3)
PROTHROMBIN TIME: 16.4 SECONDS (ref 11.1–15.3)
PROTHROMBIN TIME: 16.6 SECONDS (ref 11.1–15.3)
PROTHROMBIN TIME: 17.2 SECONDS (ref 11.1–15.3)
PROTHROMBIN TIME: 17.4 SECONDS (ref 11.1–15.3)
PROTHROMBIN TIME: 17.4 SECONDS (ref 11.1–15.3)
PROTHROMBIN TIME: 19 SECONDS (ref 11.1–15.3)
PROTHROMBIN TIME: 19.3 SECONDS (ref 11.1–15.3)
PROTHROMBIN TIME: 19.6 SECONDS (ref 11.1–15.3)
PROTHROMBIN TIME: 20.6 SECONDS (ref 11.1–15.3)
PROTHROMBIN TIME: 21.1 SECONDS (ref 11.1–15.3)
PROTHROMBIN TIME: 21.2 SECONDS (ref 11.1–15.3)
PROTHROMBIN TIME: 22.1 SECONDS (ref 11.1–15.3)
PROTHROMBIN TIME: 23.3 SECONDS (ref 11.1–15.3)
PROTHROMBIN TIME: 23.7 SECONDS (ref 11.1–15.3)
PROTHROMBIN TIME: 24.2 SECONDS (ref 11.1–15.3)
PROTHROMBIN TIME: 24.5 SECONDS (ref 11.1–15.3)
PROTHROMBIN TIME: 25.3 SECONDS (ref 11.1–15.3)
PROTHROMBIN TIME: 26.7 SECONDS (ref 11.1–15.3)
PROTHROMBIN TIME: 26.7 SECONDS (ref 11.1–15.3)
PROTHROMBIN TIME: 27.1 SECONDS (ref 11.1–15.3)
PROTHROMBIN TIME: 27.8 SECONDS (ref 11.1–15.3)
PROTHROMBIN TIME: 28.5 SECONDS (ref 11.1–15.3)
PROTHROMBIN TIME: 30.1 SECONDS (ref 11.1–15.3)
PROTHROMBIN TIME: 30.5 SECONDS (ref 11.1–15.3)
PROTHROMBIN TIME: 30.5 SECONDS (ref 11.1–15.3)
PROTHROMBIN TIME: 31.7 SECONDS (ref 11.1–15.3)
PROTHROMBIN TIME: 32.5 SECONDS (ref 11.1–15.3)
PROTHROMBIN TIME: 41.8 SECONDS (ref 11.1–15.3)
PROTHROMBIN TIME: 42.6 SECONDS (ref 11.1–15.3)
PROTHROMBIN TIME: 60.6 SECONDS (ref 11.1–15.3)
PROTHROMBIN TIME: 67.8 SECONDS (ref 11.1–15.3)
PSA SERPL-MCNC: 0.88 NG/ML (ref 0–4)
RBC # BLD AUTO: 2.47 10*6/MM3 (ref 4.14–5.8)
RBC # BLD AUTO: 2.51 10*6/MM3 (ref 4.14–5.8)
RBC # BLD AUTO: 2.55 10*6/MM3 (ref 4.14–5.8)
RBC # BLD AUTO: 2.58 10*6/MM3 (ref 4.14–5.8)
RBC # BLD AUTO: 2.63 10*6/MM3 (ref 4.14–5.8)
RBC # BLD AUTO: 2.69 10*6/MM3 (ref 4.14–5.8)
RBC # BLD AUTO: 2.7 10*6/MM3 (ref 4.14–5.8)
RBC # BLD AUTO: 2.74 10*6/MM3 (ref 4.14–5.8)
RBC # BLD AUTO: 2.74 10*6/MM3 (ref 4.14–5.8)
RBC # BLD AUTO: 2.75 10*6/MM3 (ref 4.14–5.8)
RBC # BLD AUTO: 2.78 10*6/MM3 (ref 4.14–5.8)
RBC # BLD AUTO: 2.79 10*6/MM3 (ref 4.14–5.8)
RBC # BLD AUTO: 2.8 10*6/MM3 (ref 4.14–5.8)
RBC # BLD AUTO: 2.81 10*6/MM3 (ref 4.14–5.8)
RBC # BLD AUTO: 2.83 10*6/MM3 (ref 4.14–5.8)
RBC # BLD AUTO: 2.84 10*6/MM3 (ref 4.14–5.8)
RBC # BLD AUTO: 2.85 10*6/MM3 (ref 4.14–5.8)
RBC # BLD AUTO: 2.88 10*6/MM3 (ref 4.14–5.8)
RBC # BLD AUTO: 2.92 10*6/MM3 (ref 4.14–5.8)
RBC # BLD AUTO: 3 10*6/MM3 (ref 4.14–5.8)
RBC # BLD AUTO: 3.01 10*6/MM3 (ref 4.14–5.8)
RBC # BLD AUTO: 3.03 10*6/MM3 (ref 4.14–5.8)
RBC # BLD AUTO: 3.04 10*6/MM3 (ref 4.14–5.8)
RBC # BLD AUTO: 3.07 10*6/MM3 (ref 4.14–5.8)
RBC # BLD AUTO: 3.09 10*6/MM3 (ref 4.14–5.8)
RBC # BLD AUTO: 3.12 10*6/MM3 (ref 4.14–5.8)
RBC # BLD AUTO: 3.2 10*6/MM3 (ref 4.14–5.8)
RBC # BLD AUTO: 3.3 10*6/MM3 (ref 4.14–5.8)
RBC # UR: ABNORMAL /HPF
REF LAB TEST METHOD: ABNORMAL
RH BLD: POSITIVE
RH BLD: POSITIVE
RHINOVIRUS RNA SPEC NAA+PROBE: NOT DETECTED
RSV RNA NPH QL NAA+NON-PROBE: NOT DETECTED
S PNEUM AG SPEC QL LA: NEGATIVE
SODIUM BLD-SCNC: 131 MMOL/L (ref 137–145)
SODIUM BLD-SCNC: 131 MMOL/L (ref 137–145)
SODIUM BLD-SCNC: 133 MMOL/L (ref 137–145)
SODIUM BLD-SCNC: 134 MMOL/L (ref 137–145)
SODIUM BLD-SCNC: 137 MMOL/L (ref 137–145)
SODIUM BLD-SCNC: 138 MMOL/L (ref 137–145)
SODIUM BLD-SCNC: 138 MMOL/L (ref 137–145)
SODIUM BLD-SCNC: 139 MMOL/L (ref 137–145)
SODIUM BLD-SCNC: 141 MMOL/L (ref 136–145)
SODIUM BLD-SCNC: 142 MMOL/L (ref 136–145)
SODIUM BLD-SCNC: 143 MMOL/L (ref 136–145)
SODIUM BLD-SCNC: 144 MMOL/L (ref 136–145)
SODIUM BLD-SCNC: 144 MMOL/L (ref 136–145)
SODIUM BLD-SCNC: 145 MMOL/L (ref 136–145)
SODIUM BLD-SCNC: 146 MMOL/L (ref 136–145)
SODIUM BLD-SCNC: 147 MMOL/L (ref 136–145)
SODIUM BLD-SCNC: 147 MMOL/L (ref 136–145)
SODIUM BLD-SCNC: 148 MMOL/L (ref 136–145)
SODIUM BLD-SCNC: 149 MMOL/L (ref 136–145)
SODIUM BLD-SCNC: 149 MMOL/L (ref 136–145)
SODIUM UR-SCNC: 35 MMOL/L
SP GR UR STRIP: 1.01 (ref 1–1.03)
SP GR UR STRIP: 1.01 (ref 1–1.03)
SP GR UR STRIP: 1.02 (ref 1–1.03)
SP GR UR STRIP: 1.02 (ref 1–1.03)
SQUAMOUS #/AREA URNS HPF: ABNORMAL /HPF
STRESS TARGET HR: 117 BPM
T&S EXPIRATION DATE: NORMAL
T&S EXPIRATION DATE: NORMAL
TIBC SERPL-MCNC: 187 MCG/DL (ref 261–462)
UNIT  ABO: NORMAL
UNIT  ABO: NORMAL
UNIT  RH: NORMAL
UNIT  RH: NORMAL
UROBILINOGEN UR QL STRIP: ABNORMAL
WBC MORPH BLD: NORMAL
WBC NRBC COR # BLD: 10.01 10*3/MM3 (ref 3.4–10.8)
WBC NRBC COR # BLD: 10.09 10*3/MM3 (ref 3.4–10.8)
WBC NRBC COR # BLD: 10.1 10*3/MM3 (ref 3.4–10.8)
WBC NRBC COR # BLD: 10.18 10*3/MM3 (ref 3.4–10.8)
WBC NRBC COR # BLD: 10.22 10*3/MM3 (ref 3.4–10.8)
WBC NRBC COR # BLD: 10.27 10*3/MM3 (ref 3.4–10.8)
WBC NRBC COR # BLD: 10.4 10*3/MM3 (ref 3.4–10.8)
WBC NRBC COR # BLD: 10.62 10*3/MM3 (ref 3.4–10.8)
WBC NRBC COR # BLD: 10.65 10*3/MM3 (ref 3.4–10.8)
WBC NRBC COR # BLD: 10.73 10*3/MM3 (ref 3.4–10.8)
WBC NRBC COR # BLD: 10.83 10*3/MM3 (ref 3.4–10.8)
WBC NRBC COR # BLD: 10.93 10*3/MM3 (ref 3.4–10.8)
WBC NRBC COR # BLD: 11.2 10*3/MM3 (ref 3.4–10.8)
WBC NRBC COR # BLD: 11.4 10*3/MM3 (ref 3.4–10.8)
WBC NRBC COR # BLD: 11.96 10*3/MM3 (ref 3.4–10.8)
WBC NRBC COR # BLD: 12.14 10*3/MM3 (ref 3.4–10.8)
WBC NRBC COR # BLD: 12.86 10*3/MM3 (ref 3.4–10.8)
WBC NRBC COR # BLD: 13.04 10*3/MM3 (ref 3.4–10.8)
WBC NRBC COR # BLD: 13.3 10*3/MM3 (ref 3.4–10.8)
WBC NRBC COR # BLD: 13.92 10*3/MM3 (ref 3.4–10.8)
WBC NRBC COR # BLD: 14.44 10*3/MM3 (ref 3.4–10.8)
WBC NRBC COR # BLD: 15.25 10*3/MM3 (ref 3.4–10.8)
WBC NRBC COR # BLD: 17.21 10*3/MM3 (ref 3.4–10.8)
WBC NRBC COR # BLD: 7.39 10*3/MM3 (ref 3.4–10.8)
WBC NRBC COR # BLD: 8.46 10*3/MM3 (ref 3.4–10.8)
WBC NRBC COR # BLD: 8.9 10*3/MM3 (ref 3.4–10.8)
WBC NRBC COR # BLD: 9.46 10*3/MM3 (ref 3.4–10.8)
WBC NRBC COR # BLD: 9.53 10*3/MM3 (ref 3.4–10.8)
WBC NRBC COR # BLD: 9.65 10*3/MM3 (ref 3.4–10.8)
WBC NRBC COR # BLD: 9.66 10*3/MM3 (ref 3.4–10.8)
WBC UR QL AUTO: ABNORMAL /HPF
WHOLE BLOOD HOLD SPECIMEN: NORMAL
YEAST URNS QL MICRO: ABNORMAL /HPF

## 2019-01-01 PROCEDURE — 85610 PROTHROMBIN TIME: CPT | Performed by: FAMILY MEDICINE

## 2019-01-01 PROCEDURE — 80048 BASIC METABOLIC PNL TOTAL CA: CPT | Performed by: FAMILY MEDICINE

## 2019-01-01 PROCEDURE — 94760 N-INVAS EAR/PLS OXIMETRY 1: CPT

## 2019-01-01 PROCEDURE — 97530 THERAPEUTIC ACTIVITIES: CPT

## 2019-01-01 PROCEDURE — 94799 UNLISTED PULMONARY SVC/PX: CPT

## 2019-01-01 PROCEDURE — 25010000002 CEFEPIME PER 500 MG: Performed by: FAMILY MEDICINE

## 2019-01-01 PROCEDURE — 82962 GLUCOSE BLOOD TEST: CPT

## 2019-01-01 PROCEDURE — 87205 SMEAR GRAM STAIN: CPT | Performed by: FAMILY MEDICINE

## 2019-01-01 PROCEDURE — 0T778DZ DILATION OF LEFT URETER WITH INTRALUMINAL DEVICE, VIA NATURAL OR ARTIFICIAL OPENING ENDOSCOPIC: ICD-10-PCS | Performed by: UROLOGY

## 2019-01-01 PROCEDURE — 97535 SELF CARE MNGMENT TRAINING: CPT

## 2019-01-01 PROCEDURE — 86923 COMPATIBILITY TEST ELECTRIC: CPT

## 2019-01-01 PROCEDURE — 97110 THERAPEUTIC EXERCISES: CPT

## 2019-01-01 PROCEDURE — 85025 COMPLETE CBC W/AUTO DIFF WBC: CPT | Performed by: FAMILY MEDICINE

## 2019-01-01 PROCEDURE — 88300 SURGICAL PATH GROSS: CPT | Performed by: UROLOGY

## 2019-01-01 PROCEDURE — 87150 DNA/RNA AMPLIFIED PROBE: CPT | Performed by: FAMILY MEDICINE

## 2019-01-01 PROCEDURE — C1758 CATHETER, URETERAL: HCPCS | Performed by: UROLOGY

## 2019-01-01 PROCEDURE — 80048 BASIC METABOLIC PNL TOTAL CA: CPT | Performed by: INTERNAL MEDICINE

## 2019-01-01 PROCEDURE — 86850 RBC ANTIBODY SCREEN: CPT | Performed by: FAMILY MEDICINE

## 2019-01-01 PROCEDURE — 99211 OFF/OP EST MAY X REQ PHY/QHP: CPT | Performed by: NURSE PRACTITIONER

## 2019-01-01 PROCEDURE — 25010000002 CEFTRIAXONE PER 250 MG: Performed by: INTERNAL MEDICINE

## 2019-01-01 PROCEDURE — 25010000002 METHYLPREDNISOLONE PER 40 MG: Performed by: INTERNAL MEDICINE

## 2019-01-01 PROCEDURE — 85025 COMPLETE CBC W/AUTO DIFF WBC: CPT | Performed by: INTERNAL MEDICINE

## 2019-01-01 PROCEDURE — 80053 COMPREHEN METABOLIC PANEL: CPT | Performed by: FAMILY MEDICINE

## 2019-01-01 PROCEDURE — 76775 US EXAM ABDO BACK WALL LIM: CPT

## 2019-01-01 PROCEDURE — 85610 PROTHROMBIN TIME: CPT | Performed by: NURSE PRACTITIONER

## 2019-01-01 PROCEDURE — 0TP98DZ REMOVAL OF INTRALUMINAL DEVICE FROM URETER, VIA NATURAL OR ARTIFICIAL OPENING ENDOSCOPIC: ICD-10-PCS | Performed by: UROLOGY

## 2019-01-01 PROCEDURE — 36430 TRANSFUSION BLD/BLD COMPNT: CPT

## 2019-01-01 PROCEDURE — 25010000002 LEVOFLOXACIN PER 250 MG: Performed by: UROLOGY

## 2019-01-01 PROCEDURE — 87899 AGENT NOS ASSAY W/OPTIC: CPT | Performed by: FAMILY MEDICINE

## 2019-01-01 PROCEDURE — 25010000002 IOPAMIDOL 61 % SOLUTION: Performed by: UROLOGY

## 2019-01-01 PROCEDURE — 25010000002 MORPHINE PER 10 MG: Performed by: INTERNAL MEDICINE

## 2019-01-01 PROCEDURE — 85610 PROTHROMBIN TIME: CPT | Performed by: INTERNAL MEDICINE

## 2019-01-01 PROCEDURE — 25010000002 NA FERRIC GLUC CPLX PER 12.5 MG: Performed by: INTERNAL MEDICINE

## 2019-01-01 PROCEDURE — 87186 SC STD MICRODIL/AGAR DIL: CPT | Performed by: INTERNAL MEDICINE

## 2019-01-01 PROCEDURE — 94640 AIRWAY INHALATION TREATMENT: CPT

## 2019-01-01 PROCEDURE — 88305 TISSUE EXAM BY PATHOLOGIST: CPT | Performed by: PATHOLOGY

## 2019-01-01 PROCEDURE — 86901 BLOOD TYPING SEROLOGIC RH(D): CPT | Performed by: FAMILY MEDICINE

## 2019-01-01 PROCEDURE — 99214 OFFICE O/P EST MOD 30 MIN: CPT | Performed by: INTERNAL MEDICINE

## 2019-01-01 PROCEDURE — 83540 ASSAY OF IRON: CPT | Performed by: INTERNAL MEDICINE

## 2019-01-01 PROCEDURE — 87077 CULTURE AEROBIC IDENTIFY: CPT | Performed by: INTERNAL MEDICINE

## 2019-01-01 PROCEDURE — 85007 BL SMEAR W/DIFF WBC COUNT: CPT | Performed by: FAMILY MEDICINE

## 2019-01-01 PROCEDURE — 93010 ELECTROCARDIOGRAM REPORT: CPT | Performed by: INTERNAL MEDICINE

## 2019-01-01 PROCEDURE — 93005 ELECTROCARDIOGRAM TRACING: CPT | Performed by: FAMILY MEDICINE

## 2019-01-01 PROCEDURE — 81001 URINALYSIS AUTO W/SCOPE: CPT | Performed by: NURSE PRACTITIONER

## 2019-01-01 PROCEDURE — 99285 EMERGENCY DEPT VISIT HI MDM: CPT

## 2019-01-01 PROCEDURE — 97116 GAIT TRAINING THERAPY: CPT

## 2019-01-01 PROCEDURE — 0TC48ZZ EXTIRPATION OF MATTER FROM LEFT KIDNEY PELVIS, VIA NATURAL OR ARTIFICIAL OPENING ENDOSCOPIC: ICD-10-PCS | Performed by: UROLOGY

## 2019-01-01 PROCEDURE — 25010000002 MEROPENEM PER 100 MG: Performed by: FAMILY MEDICINE

## 2019-01-01 PROCEDURE — 63510000001 EPOETIN ALFA PER 1000 UNITS: Performed by: INTERNAL MEDICINE

## 2019-01-01 PROCEDURE — 85014 HEMATOCRIT: CPT | Performed by: INTERNAL MEDICINE

## 2019-01-01 PROCEDURE — 87486 CHLMYD PNEUM DNA AMP PROBE: CPT | Performed by: FAMILY MEDICINE

## 2019-01-01 PROCEDURE — 25010000002 CEFTRIAXONE PER 250 MG: Performed by: FAMILY MEDICINE

## 2019-01-01 PROCEDURE — 97162 PT EVAL MOD COMPLEX 30 MIN: CPT

## 2019-01-01 PROCEDURE — 86900 BLOOD TYPING SEROLOGIC ABO: CPT | Performed by: INTERNAL MEDICINE

## 2019-01-01 PROCEDURE — 25010000002 LORAZEPAM PER 2 MG: Performed by: INTERNAL MEDICINE

## 2019-01-01 PROCEDURE — 86900 BLOOD TYPING SEROLOGIC ABO: CPT | Performed by: FAMILY MEDICINE

## 2019-01-01 PROCEDURE — 87086 URINE CULTURE/COLONY COUNT: CPT | Performed by: NURSE PRACTITIONER

## 2019-01-01 PROCEDURE — 87186 SC STD MICRODIL/AGAR DIL: CPT | Performed by: FAMILY MEDICINE

## 2019-01-01 PROCEDURE — P9016 RBC LEUKOCYTES REDUCED: HCPCS

## 2019-01-01 PROCEDURE — 93306 TTE W/DOPPLER COMPLETE: CPT

## 2019-01-01 PROCEDURE — 81001 URINALYSIS AUTO W/SCOPE: CPT | Performed by: INTERNAL MEDICINE

## 2019-01-01 PROCEDURE — 25010000002 CEFEPIME 2 G/NS 100 ML SOLUTION: Performed by: FAMILY MEDICINE

## 2019-01-01 PROCEDURE — 93306 TTE W/DOPPLER COMPLETE: CPT | Performed by: INTERNAL MEDICINE

## 2019-01-01 PROCEDURE — 71045 X-RAY EXAM CHEST 1 VIEW: CPT

## 2019-01-01 PROCEDURE — 99221 1ST HOSP IP/OBS SF/LOW 40: CPT | Performed by: NURSE PRACTITIONER

## 2019-01-01 PROCEDURE — 76000 FLUOROSCOPY <1 HR PHYS/QHP: CPT

## 2019-01-01 PROCEDURE — 97162 PT EVAL MOD COMPLEX 30 MIN: CPT | Performed by: PHYSICAL THERAPIST

## 2019-01-01 PROCEDURE — 25010000002 PROPOFOL 10 MG/ML EMULSION: Performed by: NURSE ANESTHETIST, CERTIFIED REGISTERED

## 2019-01-01 PROCEDURE — 25010000002 FENTANYL CITRATE (PF) 100 MCG/2ML SOLUTION: Performed by: NURSE ANESTHETIST, CERTIFIED REGISTERED

## 2019-01-01 PROCEDURE — 85018 HEMOGLOBIN: CPT | Performed by: INTERNAL MEDICINE

## 2019-01-01 PROCEDURE — 82728 ASSAY OF FERRITIN: CPT | Performed by: INTERNAL MEDICINE

## 2019-01-01 PROCEDURE — C1769 GUIDE WIRE: HCPCS | Performed by: UROLOGY

## 2019-01-01 PROCEDURE — 81001 URINALYSIS AUTO W/SCOPE: CPT | Performed by: FAMILY MEDICINE

## 2019-01-01 PROCEDURE — C2617 STENT, NON-COR, TEM W/O DEL: HCPCS | Performed by: UROLOGY

## 2019-01-01 PROCEDURE — 84300 ASSAY OF URINE SODIUM: CPT | Performed by: INTERNAL MEDICINE

## 2019-01-01 PROCEDURE — 70450 CT HEAD/BRAIN W/O DYE: CPT

## 2019-01-01 PROCEDURE — 88305 TISSUE EXAM BY PATHOLOGIST: CPT | Performed by: UROLOGY

## 2019-01-01 PROCEDURE — 74176 CT ABD & PELVIS W/O CONTRAST: CPT

## 2019-01-01 PROCEDURE — 87631 RESP VIRUS 3-5 TARGETS: CPT | Performed by: FAMILY MEDICINE

## 2019-01-01 PROCEDURE — 86900 BLOOD TYPING SEROLOGIC ABO: CPT

## 2019-01-01 PROCEDURE — 86901 BLOOD TYPING SEROLOGIC RH(D): CPT | Performed by: INTERNAL MEDICINE

## 2019-01-01 PROCEDURE — BT1FZZZ FLUOROSCOPY OF LEFT KIDNEY, URETER AND BLADDER: ICD-10-PCS | Performed by: UROLOGY

## 2019-01-01 PROCEDURE — 85025 COMPLETE CBC W/AUTO DIFF WBC: CPT | Performed by: NURSE PRACTITIONER

## 2019-01-01 PROCEDURE — 80053 COMPREHEN METABOLIC PANEL: CPT | Performed by: INTERNAL MEDICINE

## 2019-01-01 PROCEDURE — 87077 CULTURE AEROBIC IDENTIFY: CPT | Performed by: FAMILY MEDICINE

## 2019-01-01 PROCEDURE — 83550 IRON BINDING TEST: CPT | Performed by: INTERNAL MEDICINE

## 2019-01-01 PROCEDURE — 25010000002 MEROPENEM: Performed by: FAMILY MEDICINE

## 2019-01-01 PROCEDURE — 87070 CULTURE OTHR SPECIMN AEROBIC: CPT | Performed by: FAMILY MEDICINE

## 2019-01-01 PROCEDURE — 36415 COLL VENOUS BLD VENIPUNCTURE: CPT | Performed by: FAMILY MEDICINE

## 2019-01-01 PROCEDURE — G0103 PSA SCREENING: HCPCS | Performed by: INTERNAL MEDICINE

## 2019-01-01 PROCEDURE — 25010000002 MORPHINE PER 10 MG: Performed by: NURSE PRACTITIONER

## 2019-01-01 PROCEDURE — 87798 DETECT AGENT NOS DNA AMP: CPT | Performed by: FAMILY MEDICINE

## 2019-01-01 PROCEDURE — 97166 OT EVAL MOD COMPLEX 45 MIN: CPT

## 2019-01-01 PROCEDURE — 86850 RBC ANTIBODY SCREEN: CPT | Performed by: INTERNAL MEDICINE

## 2019-01-01 PROCEDURE — 93005 ELECTROCARDIOGRAM TRACING: CPT | Performed by: INTERNAL MEDICINE

## 2019-01-01 PROCEDURE — 87040 BLOOD CULTURE FOR BACTERIA: CPT | Performed by: NURSE PRACTITIONER

## 2019-01-01 PROCEDURE — 83605 ASSAY OF LACTIC ACID: CPT | Performed by: NURSE PRACTITIONER

## 2019-01-01 PROCEDURE — 74420 UROGRAPHY RTRGR +-KUB: CPT

## 2019-01-01 PROCEDURE — 87086 URINE CULTURE/COLONY COUNT: CPT | Performed by: FAMILY MEDICINE

## 2019-01-01 PROCEDURE — 25010000002 FUROSEMIDE PER 20 MG: Performed by: FAMILY MEDICINE

## 2019-01-01 PROCEDURE — 93970 EXTREMITY STUDY: CPT

## 2019-01-01 PROCEDURE — 83735 ASSAY OF MAGNESIUM: CPT | Performed by: INTERNAL MEDICINE

## 2019-01-01 PROCEDURE — 76857 US EXAM PELVIC LIMITED: CPT

## 2019-01-01 PROCEDURE — 87581 M.PNEUMON DNA AMP PROBE: CPT | Performed by: FAMILY MEDICINE

## 2019-01-01 PROCEDURE — 87205 SMEAR GRAM STAIN: CPT | Performed by: INTERNAL MEDICINE

## 2019-01-01 PROCEDURE — 25010000002 VITAMIN K1 PER 1 MG: Performed by: FAMILY MEDICINE

## 2019-01-01 PROCEDURE — 87040 BLOOD CULTURE FOR BACTERIA: CPT | Performed by: INTERNAL MEDICINE

## 2019-01-01 PROCEDURE — 92610 EVALUATE SWALLOWING FUNCTION: CPT | Performed by: SPEECH-LANGUAGE PATHOLOGIST

## 2019-01-01 PROCEDURE — 87040 BLOOD CULTURE FOR BACTERIA: CPT | Performed by: FAMILY MEDICINE

## 2019-01-01 PROCEDURE — 88300 SURGICAL PATH GROSS: CPT | Performed by: PATHOLOGY

## 2019-01-01 PROCEDURE — 87086 URINE CULTURE/COLONY COUNT: CPT | Performed by: INTERNAL MEDICINE

## 2019-01-01 DEVICE — URETERAL STENT
Type: IMPLANTABLE DEVICE | Status: FUNCTIONAL
Brand: POLARIS™ ULTRA

## 2019-01-01 RX ORDER — CALCIUM CARBONATE 200(500)MG
2 TABLET,CHEWABLE ORAL 2 TIMES DAILY PRN
Status: DISCONTINUED | OUTPATIENT
Start: 2019-01-01 | End: 2019-01-01 | Stop reason: HOSPADM

## 2019-01-01 RX ORDER — FUROSEMIDE 20 MG/1
20 TABLET ORAL
Status: DISCONTINUED | OUTPATIENT
Start: 2019-01-01 | End: 2019-01-01 | Stop reason: HOSPADM

## 2019-01-01 RX ORDER — ONDANSETRON 2 MG/ML
4 INJECTION INTRAMUSCULAR; INTRAVENOUS EVERY 6 HOURS PRN
Status: DISCONTINUED | OUTPATIENT
Start: 2019-01-01 | End: 2019-01-01 | Stop reason: HOSPADM

## 2019-01-01 RX ORDER — FUROSEMIDE 40 MG/1
40 TABLET ORAL 3 TIMES DAILY
Status: DISCONTINUED | OUTPATIENT
Start: 2019-01-01 | End: 2019-01-01

## 2019-01-01 RX ORDER — FERROUS SULFATE TAB EC 324 MG (65 MG FE EQUIVALENT) 324 (65 FE) MG
324 TABLET DELAYED RESPONSE ORAL 2 TIMES DAILY WITH MEALS
Status: DISCONTINUED | OUTPATIENT
Start: 2019-01-01 | End: 2019-01-01

## 2019-01-01 RX ORDER — DILTIAZEM HYDROCHLORIDE 240 MG/1
240 CAPSULE, COATED, EXTENDED RELEASE ORAL NIGHTLY
Status: DISCONTINUED | OUTPATIENT
Start: 2019-01-01 | End: 2019-01-01

## 2019-01-01 RX ORDER — FLUCONAZOLE 100 MG/1
100 TABLET ORAL DAILY
Qty: 1 TABLET | Refills: 0 | Status: SHIPPED | OUTPATIENT
Start: 2019-01-01 | End: 2019-01-01

## 2019-01-01 RX ORDER — DEXTROSE MONOHYDRATE 25 G/50ML
INJECTION, SOLUTION INTRAVENOUS
Status: COMPLETED
Start: 2019-01-01 | End: 2019-01-01

## 2019-01-01 RX ORDER — WARFARIN SODIUM 2.5 MG/1
2.5 TABLET ORAL
Status: DISCONTINUED | OUTPATIENT
Start: 2019-01-01 | End: 2019-01-01

## 2019-01-01 RX ORDER — MORPHINE SULFATE 2 MG/ML
2 INJECTION, SOLUTION INTRAMUSCULAR; INTRAVENOUS 2 TIMES DAILY
Status: DISCONTINUED | OUTPATIENT
Start: 2019-01-01 | End: 2019-01-01 | Stop reason: HOSPADM

## 2019-01-01 RX ORDER — SODIUM CHLORIDE 0.9 % (FLUSH) 0.9 %
10 SYRINGE (ML) INJECTION AS NEEDED
Status: DISCONTINUED | OUTPATIENT
Start: 2019-01-01 | End: 2019-01-01 | Stop reason: HOSPADM

## 2019-01-01 RX ORDER — LANOLIN ALCOHOL/MO/W.PET/CERES
1000 CREAM (GRAM) TOPICAL DAILY
Status: DISCONTINUED | OUTPATIENT
Start: 2019-01-01 | End: 2019-01-01

## 2019-01-01 RX ORDER — LANOLIN ALCOHOL/MO/W.PET/CERES
6 CREAM (GRAM) TOPICAL NIGHTLY PRN
Status: DISCONTINUED | OUTPATIENT
Start: 2019-01-01 | End: 2019-01-01

## 2019-01-01 RX ORDER — METOPROLOL TARTRATE 5 MG/5ML
5 INJECTION INTRAVENOUS
Status: COMPLETED | OUTPATIENT
Start: 2019-01-01 | End: 2019-01-01

## 2019-01-01 RX ORDER — ATORVASTATIN CALCIUM 10 MG/1
10 TABLET, FILM COATED ORAL NIGHTLY
Status: DISCONTINUED | OUTPATIENT
Start: 2019-01-01 | End: 2019-01-01 | Stop reason: HOSPADM

## 2019-01-01 RX ORDER — CEFUROXIME AXETIL 250 MG/1
250 TABLET ORAL EVERY 12 HOURS SCHEDULED
Qty: 7 TABLET | Refills: 0 | Status: SHIPPED | OUTPATIENT
Start: 2019-01-01 | End: 2019-01-01

## 2019-01-01 RX ORDER — PROPOFOL 10 MG/ML
VIAL (ML) INTRAVENOUS AS NEEDED
Status: DISCONTINUED | OUTPATIENT
Start: 2019-01-01 | End: 2019-01-01 | Stop reason: SURG

## 2019-01-01 RX ORDER — MORPHINE SULFATE 2 MG/ML
1 INJECTION, SOLUTION INTRAMUSCULAR; INTRAVENOUS
Status: DISCONTINUED | OUTPATIENT
Start: 2019-01-01 | End: 2019-01-01 | Stop reason: HOSPADM

## 2019-01-01 RX ORDER — POTASSIUM CHLORIDE 1.5 G/1.77G
40 POWDER, FOR SOLUTION ORAL ONCE
Status: COMPLETED | OUTPATIENT
Start: 2019-01-01 | End: 2019-01-01

## 2019-01-01 RX ORDER — PREDNISONE 20 MG/1
40 TABLET ORAL
Status: DISCONTINUED | OUTPATIENT
Start: 2019-01-01 | End: 2019-01-01

## 2019-01-01 RX ORDER — ASPIRIN 81 MG/1
81 TABLET ORAL DAILY
Status: DISCONTINUED | OUTPATIENT
Start: 2019-01-01 | End: 2019-01-01

## 2019-01-01 RX ORDER — DEXTROSE MONOHYDRATE 50 MG/ML
75 INJECTION, SOLUTION INTRAVENOUS CONTINUOUS
Status: DISCONTINUED | OUTPATIENT
Start: 2019-01-01 | End: 2019-01-01

## 2019-01-01 RX ORDER — SENNA AND DOCUSATE SODIUM 50; 8.6 MG/1; MG/1
2 TABLET, FILM COATED ORAL 2 TIMES DAILY PRN
Status: DISCONTINUED | OUTPATIENT
Start: 2019-01-01 | End: 2019-01-01 | Stop reason: HOSPADM

## 2019-01-01 RX ORDER — POLYETHYLENE GLYCOL 3350 17 G/17G
17 POWDER, FOR SOLUTION ORAL 2 TIMES DAILY
Status: DISCONTINUED | OUTPATIENT
Start: 2019-01-01 | End: 2019-01-01

## 2019-01-01 RX ORDER — LANOLIN ALCOHOL/MO/W.PET/CERES
6 CREAM (GRAM) TOPICAL NIGHTLY PRN
Status: DISCONTINUED | OUTPATIENT
Start: 2019-01-01 | End: 2019-01-01 | Stop reason: HOSPADM

## 2019-01-01 RX ORDER — DILTIAZEM HYDROCHLORIDE 240 MG/1
240 CAPSULE, COATED, EXTENDED RELEASE ORAL DAILY
Status: DISCONTINUED | OUTPATIENT
Start: 2019-01-01 | End: 2019-01-01 | Stop reason: HOSPADM

## 2019-01-01 RX ORDER — DEXTROSE MONOHYDRATE 50 MG/ML
50 INJECTION, SOLUTION INTRAVENOUS CONTINUOUS
Status: DISCONTINUED | OUTPATIENT
Start: 2019-01-01 | End: 2019-01-01

## 2019-01-01 RX ORDER — FUROSEMIDE 20 MG/1
20 TABLET ORAL
Status: DISCONTINUED | OUTPATIENT
Start: 2019-01-01 | End: 2019-01-01

## 2019-01-01 RX ORDER — WARFARIN SODIUM 1 MG/1
1 TABLET ORAL
Status: DISCONTINUED | OUTPATIENT
Start: 2019-01-01 | End: 2019-01-01

## 2019-01-01 RX ORDER — FUROSEMIDE 10 MG/ML
20 INJECTION INTRAMUSCULAR; INTRAVENOUS ONCE
Status: DISCONTINUED | OUTPATIENT
Start: 2019-01-01 | End: 2019-01-01

## 2019-01-01 RX ORDER — SODIUM CHLORIDE, SODIUM LACTATE, POTASSIUM CHLORIDE, CALCIUM CHLORIDE 600; 310; 30; 20 MG/100ML; MG/100ML; MG/100ML; MG/100ML
75 INJECTION, SOLUTION INTRAVENOUS CONTINUOUS
Status: DISCONTINUED | OUTPATIENT
Start: 2019-01-01 | End: 2019-01-01

## 2019-01-01 RX ORDER — FLECAINIDE ACETATE 50 MG/1
50 TABLET ORAL EVERY 12 HOURS
Status: DISCONTINUED | OUTPATIENT
Start: 2019-01-01 | End: 2019-01-01 | Stop reason: HOSPADM

## 2019-01-01 RX ORDER — LEVOFLOXACIN 5 MG/ML
500 INJECTION, SOLUTION INTRAVENOUS ONCE
Status: CANCELLED | OUTPATIENT
Start: 2019-01-01

## 2019-01-01 RX ORDER — SODIUM CHLORIDE 9 MG/ML
50 INJECTION, SOLUTION INTRAVENOUS CONTINUOUS
Status: DISCONTINUED | OUTPATIENT
Start: 2019-01-01 | End: 2019-01-01

## 2019-01-01 RX ORDER — LANOLIN ALCOHOL/MO/W.PET/CERES
5 CREAM (GRAM) TOPICAL NIGHTLY PRN
Status: DISCONTINUED | OUTPATIENT
Start: 2019-01-01 | End: 2019-01-01 | Stop reason: ALTCHOICE

## 2019-01-01 RX ORDER — NICOTINE POLACRILEX 4 MG
15 LOZENGE BUCCAL
Status: DISCONTINUED | OUTPATIENT
Start: 2019-01-01 | End: 2019-01-01

## 2019-01-01 RX ORDER — FERROUS SULFATE TAB EC 324 MG (65 MG FE EQUIVALENT) 324 (65 FE) MG
324 TABLET DELAYED RESPONSE ORAL 2 TIMES DAILY WITH MEALS
Status: DISCONTINUED | OUTPATIENT
Start: 2019-01-01 | End: 2019-01-01 | Stop reason: HOSPADM

## 2019-01-01 RX ORDER — IPRATROPIUM BROMIDE AND ALBUTEROL SULFATE 2.5; .5 MG/3ML; MG/3ML
3 SOLUTION RESPIRATORY (INHALATION)
COMMUNITY

## 2019-01-01 RX ORDER — SODIUM CHLORIDE 0.9 % (FLUSH) 0.9 %
3 SYRINGE (ML) INJECTION EVERY 12 HOURS SCHEDULED
Status: DISCONTINUED | OUTPATIENT
Start: 2019-01-01 | End: 2019-01-01 | Stop reason: HOSPADM

## 2019-01-01 RX ORDER — FLECAINIDE ACETATE 50 MG/1
50 TABLET ORAL EVERY 12 HOURS
Status: DISCONTINUED | OUTPATIENT
Start: 2019-01-01 | End: 2019-01-01

## 2019-01-01 RX ORDER — POTASSIUM CHLORIDE 750 MG/1
40 CAPSULE, EXTENDED RELEASE ORAL ONCE
Status: COMPLETED | OUTPATIENT
Start: 2019-01-01 | End: 2019-01-01

## 2019-01-01 RX ORDER — ALBUTEROL SULFATE 2.5 MG/3ML
2.5 SOLUTION RESPIRATORY (INHALATION) EVERY 6 HOURS PRN
Status: DISCONTINUED | OUTPATIENT
Start: 2019-01-01 | End: 2019-01-01 | Stop reason: HOSPADM

## 2019-01-01 RX ORDER — TAMSULOSIN HYDROCHLORIDE 0.4 MG/1
0.4 CAPSULE ORAL NIGHTLY
Status: DISCONTINUED | OUTPATIENT
Start: 2019-01-01 | End: 2019-01-01 | Stop reason: HOSPADM

## 2019-01-01 RX ORDER — DEXTROSE MONOHYDRATE 25 G/50ML
50 INJECTION, SOLUTION INTRAVENOUS
Status: DISCONTINUED | OUTPATIENT
Start: 2019-01-01 | End: 2019-01-01

## 2019-01-01 RX ORDER — LEVOFLOXACIN 5 MG/ML
500 INJECTION, SOLUTION INTRAVENOUS ONCE
Status: COMPLETED | OUTPATIENT
Start: 2019-01-01 | End: 2019-01-01

## 2019-01-01 RX ORDER — SODIUM CHLORIDE 9 MG/ML
INJECTION, SOLUTION INTRAVENOUS
Status: COMPLETED
Start: 2019-01-01 | End: 2019-01-01

## 2019-01-01 RX ORDER — FENTANYL CITRATE 50 UG/ML
INJECTION, SOLUTION INTRAMUSCULAR; INTRAVENOUS AS NEEDED
Status: DISCONTINUED | OUTPATIENT
Start: 2019-01-01 | End: 2019-01-01 | Stop reason: SURG

## 2019-01-01 RX ORDER — LORAZEPAM 0.5 MG/1
0.75 TABLET ORAL 2 TIMES DAILY PRN
Status: DISCONTINUED | OUTPATIENT
Start: 2019-01-01 | End: 2019-01-01 | Stop reason: HOSPADM

## 2019-01-01 RX ORDER — SCOLOPAMINE TRANSDERMAL SYSTEM 1 MG/1
1 PATCH, EXTENDED RELEASE TRANSDERMAL
Status: DISCONTINUED | OUTPATIENT
Start: 2019-01-01 | End: 2019-01-01 | Stop reason: HOSPADM

## 2019-01-01 RX ORDER — IPRATROPIUM BROMIDE AND ALBUTEROL SULFATE 2.5; .5 MG/3ML; MG/3ML
3 SOLUTION RESPIRATORY (INHALATION)
Status: DISCONTINUED | OUTPATIENT
Start: 2019-01-01 | End: 2019-01-01

## 2019-01-01 RX ORDER — METHYLPREDNISOLONE SODIUM SUCCINATE 40 MG/ML
40 INJECTION, POWDER, LYOPHILIZED, FOR SOLUTION INTRAMUSCULAR; INTRAVENOUS EVERY 8 HOURS
Status: DISCONTINUED | OUTPATIENT
Start: 2019-01-01 | End: 2019-01-01

## 2019-01-01 RX ORDER — SODIUM CHLORIDE 9 MG/ML
INJECTION, SOLUTION INTRAVENOUS CONTINUOUS PRN
Status: DISCONTINUED | OUTPATIENT
Start: 2019-01-01 | End: 2019-01-01 | Stop reason: SURG

## 2019-01-01 RX ORDER — ONDANSETRON 4 MG/1
4 TABLET, FILM COATED ORAL EVERY 6 HOURS PRN
Status: DISCONTINUED | OUTPATIENT
Start: 2019-01-01 | End: 2019-01-01 | Stop reason: HOSPADM

## 2019-01-01 RX ORDER — SENNA AND DOCUSATE SODIUM 50; 8.6 MG/1; MG/1
2 TABLET, FILM COATED ORAL 2 TIMES DAILY PRN
Qty: 30 TABLET | Refills: 1 | Status: SHIPPED | OUTPATIENT
Start: 2019-01-01 | End: 2019-01-01

## 2019-01-01 RX ORDER — ATROPINE SULFATE 10 MG/ML
2 SOLUTION/ DROPS OPHTHALMIC 4 TIMES DAILY
Status: DISCONTINUED | OUTPATIENT
Start: 2019-01-01 | End: 2019-01-01 | Stop reason: HOSPADM

## 2019-01-01 RX ORDER — DILTIAZEM HYDROCHLORIDE 5 MG/ML
10 INJECTION INTRAVENOUS ONCE
Status: COMPLETED | OUTPATIENT
Start: 2019-01-01 | End: 2019-01-01

## 2019-01-01 RX ORDER — SODIUM CHLORIDE 0.9 % (FLUSH) 0.9 %
3-10 SYRINGE (ML) INJECTION AS NEEDED
Status: DISCONTINUED | OUTPATIENT
Start: 2019-01-01 | End: 2019-01-01 | Stop reason: HOSPADM

## 2019-01-01 RX ORDER — FLECAINIDE ACETATE 50 MG/1
TABLET ORAL
Qty: 60 TABLET | Refills: 8 | Status: SHIPPED | OUTPATIENT
Start: 2019-01-01

## 2019-01-01 RX ORDER — AZITHROMYCIN 250 MG/1
500 TABLET, FILM COATED ORAL ONCE
Status: COMPLETED | OUTPATIENT
Start: 2019-01-01 | End: 2019-01-01

## 2019-01-01 RX ORDER — FOLIC ACID 1 MG/1
1 TABLET ORAL DAILY
Qty: 30 TABLET | Refills: 1 | Status: SHIPPED | OUTPATIENT
Start: 2019-01-01

## 2019-01-01 RX ORDER — LANOLIN ALCOHOL/MO/W.PET/CERES
6 CREAM (GRAM) TOPICAL NIGHTLY PRN
Qty: 10 TABLET | Refills: 0 | Status: SHIPPED | OUTPATIENT
Start: 2019-01-01

## 2019-01-01 RX ORDER — AZITHROMYCIN 250 MG/1
500 TABLET, FILM COATED ORAL EVERY 24 HOURS
Status: COMPLETED | OUTPATIENT
Start: 2019-01-01 | End: 2019-01-01

## 2019-01-01 RX ORDER — FUROSEMIDE 40 MG/1
40 TABLET ORAL DAILY
Status: DISCONTINUED | OUTPATIENT
Start: 2019-01-01 | End: 2019-01-01

## 2019-01-01 RX ORDER — TAMSULOSIN HYDROCHLORIDE 0.4 MG/1
0.4 CAPSULE ORAL NIGHTLY
Qty: 30 CAPSULE | Refills: 1 | Status: SHIPPED | OUTPATIENT
Start: 2019-01-01

## 2019-01-01 RX ORDER — ACETAMINOPHEN 325 MG/1
650 TABLET ORAL EVERY 6 HOURS PRN
Status: DISCONTINUED | OUTPATIENT
Start: 2019-01-01 | End: 2019-01-01 | Stop reason: HOSPADM

## 2019-01-01 RX ORDER — ONDANSETRON 4 MG/1
4 TABLET, ORALLY DISINTEGRATING ORAL EVERY 6 HOURS PRN
Status: DISCONTINUED | OUTPATIENT
Start: 2019-01-01 | End: 2019-01-01 | Stop reason: HOSPADM

## 2019-01-01 RX ORDER — LORAZEPAM 2 MG/ML
0.5 INJECTION INTRAMUSCULAR EVERY 4 HOURS PRN
Status: DISCONTINUED | OUTPATIENT
Start: 2019-01-01 | End: 2019-01-01 | Stop reason: HOSPADM

## 2019-01-01 RX ORDER — MORPHINE SULFATE 2 MG/ML
1 INJECTION, SOLUTION INTRAMUSCULAR; INTRAVENOUS 2 TIMES DAILY
Status: DISCONTINUED | OUTPATIENT
Start: 2019-01-01 | End: 2019-01-01

## 2019-01-01 RX ORDER — CEFUROXIME AXETIL 250 MG/1
250 TABLET ORAL EVERY 12 HOURS SCHEDULED
Status: DISCONTINUED | OUTPATIENT
Start: 2019-01-01 | End: 2019-01-01 | Stop reason: HOSPADM

## 2019-01-01 RX ORDER — BISACODYL 10 MG
10 SUPPOSITORY, RECTAL RECTAL DAILY PRN
Status: DISCONTINUED | OUTPATIENT
Start: 2019-01-01 | End: 2019-01-01 | Stop reason: HOSPADM

## 2019-01-01 RX ORDER — BACITRACIN ZINC 500 [USP'U]/G
OINTMENT TOPICAL DAILY
Status: DISCONTINUED | OUTPATIENT
Start: 2019-01-01 | End: 2019-01-01 | Stop reason: HOSPADM

## 2019-01-01 RX ORDER — MULTIVITAMIN,THERAPEUTIC
1 TABLET ORAL DAILY
Status: DISCONTINUED | OUTPATIENT
Start: 2019-01-01 | End: 2019-01-01 | Stop reason: HOSPADM

## 2019-01-01 RX ORDER — ATORVASTATIN CALCIUM 10 MG/1
10 TABLET, FILM COATED ORAL NIGHTLY
Status: DISCONTINUED | OUTPATIENT
Start: 2019-01-01 | End: 2019-01-01

## 2019-01-01 RX ORDER — DEXTROSE MONOHYDRATE 25 G/50ML
25 INJECTION, SOLUTION INTRAVENOUS
Status: DISCONTINUED | OUTPATIENT
Start: 2019-01-01 | End: 2019-01-01

## 2019-01-01 RX ORDER — FLUCONAZOLE 100 MG/1
100 TABLET ORAL DAILY
Status: DISCONTINUED | OUTPATIENT
Start: 2019-01-01 | End: 2019-01-01 | Stop reason: HOSPADM

## 2019-01-01 RX ORDER — AZITHROMYCIN 250 MG/1
500 TABLET, FILM COATED ORAL
Status: DISCONTINUED | OUTPATIENT
Start: 2019-01-01 | End: 2019-01-01

## 2019-01-01 RX ORDER — TAMSULOSIN HYDROCHLORIDE 0.4 MG/1
0.4 CAPSULE ORAL NIGHTLY
Status: DISCONTINUED | OUTPATIENT
Start: 2019-01-01 | End: 2019-01-01

## 2019-01-01 RX ORDER — FOLIC ACID 1 MG/1
1 TABLET ORAL DAILY
Status: DISCONTINUED | OUTPATIENT
Start: 2019-01-01 | End: 2019-01-01

## 2019-01-01 RX ORDER — IPRATROPIUM BROMIDE AND ALBUTEROL SULFATE 2.5; .5 MG/3ML; MG/3ML
3 SOLUTION RESPIRATORY (INHALATION)
Status: DISCONTINUED | OUTPATIENT
Start: 2019-01-01 | End: 2019-01-01 | Stop reason: HOSPADM

## 2019-01-01 RX ORDER — SODIUM CHLORIDE 9 MG/ML
INJECTION, SOLUTION INTRAVENOUS
Status: DISPENSED
Start: 2019-01-01 | End: 2019-01-01

## 2019-01-01 RX ORDER — METHYLPREDNISOLONE SODIUM SUCCINATE 40 MG/ML
20 INJECTION, POWDER, LYOPHILIZED, FOR SOLUTION INTRAMUSCULAR; INTRAVENOUS EVERY 8 HOURS
Status: DISCONTINUED | OUTPATIENT
Start: 2019-01-01 | End: 2019-01-01

## 2019-01-01 RX ORDER — LEVOTHYROXINE SODIUM 0.05 MG/1
50 TABLET ORAL DAILY
Status: DISCONTINUED | OUTPATIENT
Start: 2019-01-01 | End: 2019-01-01 | Stop reason: HOSPADM

## 2019-01-01 RX ORDER — LANOLIN ALCOHOL/MO/W.PET/CERES
1000 CREAM (GRAM) TOPICAL DAILY
Status: DISCONTINUED | OUTPATIENT
Start: 2019-01-01 | End: 2019-01-01 | Stop reason: HOSPADM

## 2019-01-01 RX ORDER — DEXTROSE AND SODIUM CHLORIDE 5; .45 G/100ML; G/100ML
75 INJECTION, SOLUTION INTRAVENOUS CONTINUOUS
Status: DISCONTINUED | OUTPATIENT
Start: 2019-01-01 | End: 2019-01-01

## 2019-01-01 RX ORDER — LIDOCAINE HYDROCHLORIDE 10 MG/ML
INJECTION, SOLUTION INFILTRATION; PERINEURAL AS NEEDED
Status: DISCONTINUED | OUTPATIENT
Start: 2019-01-01 | End: 2019-01-01 | Stop reason: SURG

## 2019-01-01 RX ORDER — FOLIC ACID 1 MG/1
1 TABLET ORAL DAILY
Status: DISCONTINUED | OUTPATIENT
Start: 2019-01-01 | End: 2019-01-01 | Stop reason: HOSPADM

## 2019-01-01 RX ORDER — LEVOTHYROXINE SODIUM 0.05 MG/1
50 TABLET ORAL
Status: DISCONTINUED | OUTPATIENT
Start: 2019-01-01 | End: 2019-01-01

## 2019-01-01 RX ORDER — WARFARIN SODIUM 3 MG/1
3 TABLET ORAL
Status: DISCONTINUED | OUTPATIENT
Start: 2019-01-01 | End: 2019-01-01 | Stop reason: HOSPADM

## 2019-01-01 RX ADMIN — LEVOTHYROXINE SODIUM 50 MCG: 50 TABLET ORAL at 08:47

## 2019-01-01 RX ADMIN — CEFEPIME HYDROCHLORIDE 2 G: 2 INJECTION, POWDER, FOR SOLUTION INTRAVENOUS at 14:01

## 2019-01-01 RX ADMIN — ASPIRIN 81 MG: 81 TABLET, COATED ORAL at 09:04

## 2019-01-01 RX ADMIN — SODIUM CHLORIDE, POTASSIUM CHLORIDE, SODIUM LACTATE AND CALCIUM CHLORIDE 75 ML/HR: 600; 310; 30; 20 INJECTION, SOLUTION INTRAVENOUS at 03:14

## 2019-01-01 RX ADMIN — CEFEPIME HYDROCHLORIDE 2 G: 2 INJECTION, POWDER, FOR SOLUTION INTRAVENOUS at 03:08

## 2019-01-01 RX ADMIN — IPRATROPIUM BROMIDE AND ALBUTEROL SULFATE 3 ML: 2.5; .5 SOLUTION RESPIRATORY (INHALATION) at 15:23

## 2019-01-01 RX ADMIN — IPRATROPIUM BROMIDE AND ALBUTEROL SULFATE 3 ML: 2.5; .5 SOLUTION RESPIRATORY (INHALATION) at 11:35

## 2019-01-01 RX ADMIN — CEFEPIME HYDROCHLORIDE 2 G: 2 INJECTION, POWDER, FOR SOLUTION INTRAVENOUS at 02:55

## 2019-01-01 RX ADMIN — TAMSULOSIN HYDROCHLORIDE 0.4 MG: 0.4 CAPSULE ORAL at 20:21

## 2019-01-01 RX ADMIN — FERROUS SULFATE TAB EC 324 MG (65 MG FE EQUIVALENT) 324 MG: 324 (65 FE) TABLET DELAYED RESPONSE at 09:04

## 2019-01-01 RX ADMIN — IPRATROPIUM BROMIDE AND ALBUTEROL SULFATE 3 ML: 2.5; .5 SOLUTION RESPIRATORY (INHALATION) at 06:42

## 2019-01-01 RX ADMIN — FOLIC ACID 1 MG: 1 TABLET ORAL at 09:04

## 2019-01-01 RX ADMIN — CYANOCOBALAMIN TAB 1000 MCG 1000 MCG: 1000 TAB at 08:38

## 2019-01-01 RX ADMIN — SODIUM CHLORIDE 50 ML/HR: 9 INJECTION, SOLUTION INTRAVENOUS at 10:30

## 2019-01-01 RX ADMIN — IPRATROPIUM BROMIDE AND ALBUTEROL SULFATE 3 ML: 2.5; .5 SOLUTION RESPIRATORY (INHALATION) at 07:08

## 2019-01-01 RX ADMIN — SODIUM CHLORIDE, PRESERVATIVE FREE 3 ML: 5 INJECTION INTRAVENOUS at 20:36

## 2019-01-01 RX ADMIN — FERROUS SULFATE TAB EC 324 MG (65 MG FE EQUIVALENT) 324 MG: 324 (65 FE) TABLET DELAYED RESPONSE at 10:15

## 2019-01-01 RX ADMIN — HYDROCORTISONE: 1 CREAM TOPICAL at 10:07

## 2019-01-01 RX ADMIN — FUROSEMIDE 20 MG: 20 TABLET ORAL at 12:12

## 2019-01-01 RX ADMIN — ATORVASTATIN CALCIUM 10 MG: 10 TABLET, FILM COATED ORAL at 20:50

## 2019-01-01 RX ADMIN — POTASSIUM CHLORIDE 40 MEQ: 1.5 POWDER, FOR SOLUTION ORAL at 17:16

## 2019-01-01 RX ADMIN — CYANOCOBALAMIN TAB 1000 MCG 1000 MCG: 1000 TAB at 08:01

## 2019-01-01 RX ADMIN — FLECAINIDE ACETATE 50 MG: 50 TABLET ORAL at 20:47

## 2019-01-01 RX ADMIN — METOPROLOL TARTRATE 5 MG: 5 INJECTION INTRAVENOUS at 23:13

## 2019-01-01 RX ADMIN — FERROUS SULFATE TAB EC 324 MG (65 MG FE EQUIVALENT) 324 MG: 324 (65 FE) TABLET DELAYED RESPONSE at 08:15

## 2019-01-01 RX ADMIN — CEFEPIME HYDROCHLORIDE 2 G: 2 INJECTION, POWDER, FOR SOLUTION INTRAVENOUS at 03:13

## 2019-01-01 RX ADMIN — HYDROCORTISONE: 1 CREAM TOPICAL at 21:09

## 2019-01-01 RX ADMIN — ALBUTEROL SULFATE 2.5 MG: 2.5 SOLUTION RESPIRATORY (INHALATION) at 04:28

## 2019-01-01 RX ADMIN — LEVOTHYROXINE SODIUM 50 MCG: 50 TABLET ORAL at 07:15

## 2019-01-01 RX ADMIN — FLECAINIDE ACETATE 50 MG: 50 TABLET ORAL at 20:42

## 2019-01-01 RX ADMIN — FERROUS SULFATE TAB EC 324 MG (65 MG FE EQUIVALENT) 324 MG: 324 (65 FE) TABLET DELAYED RESPONSE at 10:24

## 2019-01-01 RX ADMIN — PROPOFOL 30 MG: 10 INJECTION, EMULSION INTRAVENOUS at 19:10

## 2019-01-01 RX ADMIN — SODIUM CHLORIDE, PRESERVATIVE FREE 3 ML: 5 INJECTION INTRAVENOUS at 08:38

## 2019-01-01 RX ADMIN — BACITRACIN: 500 OINTMENT TOPICAL at 10:08

## 2019-01-01 RX ADMIN — IPRATROPIUM BROMIDE AND ALBUTEROL SULFATE 3 ML: 2.5; .5 SOLUTION RESPIRATORY (INHALATION) at 20:42

## 2019-01-01 RX ADMIN — BACITRACIN: 500 OINTMENT TOPICAL at 09:10

## 2019-01-01 RX ADMIN — FOLIC ACID 1 MG: 1 TABLET ORAL at 09:16

## 2019-01-01 RX ADMIN — CEFTRIAXONE SODIUM 1 G: 1 INJECTION, POWDER, FOR SOLUTION INTRAMUSCULAR; INTRAVENOUS at 17:45

## 2019-01-01 RX ADMIN — IPRATROPIUM BROMIDE AND ALBUTEROL SULFATE 3 ML: 2.5; .5 SOLUTION RESPIRATORY (INHALATION) at 20:57

## 2019-01-01 RX ADMIN — DEXTROSE AND SODIUM CHLORIDE 75 ML/HR: 5; 450 INJECTION, SOLUTION INTRAVENOUS at 15:36

## 2019-01-01 RX ADMIN — IPRATROPIUM BROMIDE AND ALBUTEROL SULFATE 3 ML: 2.5; .5 SOLUTION RESPIRATORY (INHALATION) at 07:50

## 2019-01-01 RX ADMIN — FERROUS SULFATE TAB EC 324 MG (65 MG FE EQUIVALENT) 324 MG: 324 (65 FE) TABLET DELAYED RESPONSE at 17:16

## 2019-01-01 RX ADMIN — CEFTRIAXONE SODIUM 1 G: 1 INJECTION, POWDER, FOR SOLUTION INTRAMUSCULAR; INTRAVENOUS at 17:54

## 2019-01-01 RX ADMIN — SODIUM CHLORIDE, PRESERVATIVE FREE 3 ML: 5 INJECTION INTRAVENOUS at 20:02

## 2019-01-01 RX ADMIN — SODIUM CHLORIDE, PRESERVATIVE FREE 10 ML: 5 INJECTION INTRAVENOUS at 10:15

## 2019-01-01 RX ADMIN — SODIUM CHLORIDE, POTASSIUM CHLORIDE, SODIUM LACTATE AND CALCIUM CHLORIDE 75 ML/HR: 600; 310; 30; 20 INJECTION, SOLUTION INTRAVENOUS at 15:02

## 2019-01-01 RX ADMIN — WARFARIN SODIUM 3 MG: 3 TABLET ORAL at 17:53

## 2019-01-01 RX ADMIN — DILTIAZEM HYDROCHLORIDE 240 MG: 240 CAPSULE, COATED, EXTENDED RELEASE ORAL at 21:09

## 2019-01-01 RX ADMIN — FOLIC ACID 1 MG: 1 TABLET ORAL at 09:07

## 2019-01-01 RX ADMIN — IPRATROPIUM BROMIDE AND ALBUTEROL SULFATE 3 ML: 2.5; .5 SOLUTION RESPIRATORY (INHALATION) at 07:47

## 2019-01-01 RX ADMIN — PROPOFOL 40 MG: 10 INJECTION, EMULSION INTRAVENOUS at 19:38

## 2019-01-01 RX ADMIN — BACITRACIN: 500 OINTMENT TOPICAL at 10:24

## 2019-01-01 RX ADMIN — SODIUM CHLORIDE, PRESERVATIVE FREE 3 ML: 5 INJECTION INTRAVENOUS at 08:00

## 2019-01-01 RX ADMIN — SODIUM CHLORIDE, POTASSIUM CHLORIDE, SODIUM LACTATE AND CALCIUM CHLORIDE 75 ML/HR: 600; 310; 30; 20 INJECTION, SOLUTION INTRAVENOUS at 03:37

## 2019-01-01 RX ADMIN — BISACODYL 10 MG: 10 SUPPOSITORY RECTAL at 14:58

## 2019-01-01 RX ADMIN — FUROSEMIDE 20 MG: 20 TABLET ORAL at 20:44

## 2019-01-01 RX ADMIN — LEVOTHYROXINE SODIUM 50 MCG: 50 TABLET ORAL at 09:08

## 2019-01-01 RX ADMIN — PHYTONADIONE 10 MG: 10 INJECTION, EMULSION INTRAMUSCULAR; INTRAVENOUS; SUBCUTANEOUS at 18:44

## 2019-01-01 RX ADMIN — THERA TABS 1 TABLET: TAB at 08:15

## 2019-01-01 RX ADMIN — DEXTROSE MONOHYDRATE 50 ML: 25 INJECTION, SOLUTION INTRAVENOUS at 03:32

## 2019-01-01 RX ADMIN — FOLIC ACID 1 MG: 1 TABLET ORAL at 19:06

## 2019-01-01 RX ADMIN — FLECAINIDE ACETATE 50 MG: 50 TABLET ORAL at 19:30

## 2019-01-01 RX ADMIN — FERROUS SULFATE TAB EC 324 MG (65 MG FE EQUIVALENT) 324 MG: 324 (65 FE) TABLET DELAYED RESPONSE at 09:09

## 2019-01-01 RX ADMIN — SODIUM CHLORIDE 100 ML/HR: 9 INJECTION, SOLUTION INTRAVENOUS at 23:18

## 2019-01-01 RX ADMIN — IPRATROPIUM BROMIDE AND ALBUTEROL SULFATE 3 ML: 2.5; .5 SOLUTION RESPIRATORY (INHALATION) at 10:28

## 2019-01-01 RX ADMIN — HYDROCORTISONE: 1 CREAM TOPICAL at 20:50

## 2019-01-01 RX ADMIN — SODIUM CHLORIDE, PRESERVATIVE FREE 3 ML: 5 INJECTION INTRAVENOUS at 20:45

## 2019-01-01 RX ADMIN — HYDROCORTISONE: 1 CREAM TOPICAL at 10:25

## 2019-01-01 RX ADMIN — CYANOCOBALAMIN TAB 1000 MCG 1000 MCG: 1000 TAB at 09:01

## 2019-01-01 RX ADMIN — FOLIC ACID 1 MG: 1 TABLET ORAL at 09:02

## 2019-01-01 RX ADMIN — FERROUS SULFATE TAB EC 324 MG (65 MG FE EQUIVALENT) 324 MG: 324 (65 FE) TABLET DELAYED RESPONSE at 17:52

## 2019-01-01 RX ADMIN — IPRATROPIUM BROMIDE AND ALBUTEROL SULFATE 3 ML: 2.5; .5 SOLUTION RESPIRATORY (INHALATION) at 15:38

## 2019-01-01 RX ADMIN — FOLIC ACID 1 MG: 1 TABLET ORAL at 08:17

## 2019-01-01 RX ADMIN — IPRATROPIUM BROMIDE AND ALBUTEROL SULFATE 3 ML: 2.5; .5 SOLUTION RESPIRATORY (INHALATION) at 09:02

## 2019-01-01 RX ADMIN — DILTIAZEM HYDROCHLORIDE 240 MG: 240 CAPSULE, COATED, EXTENDED RELEASE ORAL at 19:30

## 2019-01-01 RX ADMIN — SODIUM CHLORIDE, PRESERVATIVE FREE 3 ML: 5 INJECTION INTRAVENOUS at 21:05

## 2019-01-01 RX ADMIN — IPRATROPIUM BROMIDE AND ALBUTEROL SULFATE 3 ML: 2.5; .5 SOLUTION RESPIRATORY (INHALATION) at 07:32

## 2019-01-01 RX ADMIN — FLECAINIDE ACETATE 50 MG: 50 TABLET ORAL at 20:29

## 2019-01-01 RX ADMIN — LEVOTHYROXINE SODIUM 50 MCG: 50 TABLET ORAL at 07:45

## 2019-01-01 RX ADMIN — PROPOFOL 10 MG: 10 INJECTION, EMULSION INTRAVENOUS at 19:40

## 2019-01-01 RX ADMIN — CEFTRIAXONE SODIUM 1 G: 1 INJECTION, POWDER, FOR SOLUTION INTRAMUSCULAR; INTRAVENOUS at 17:00

## 2019-01-01 RX ADMIN — LEVOTHYROXINE SODIUM 50 MCG: 50 TABLET ORAL at 06:33

## 2019-01-01 RX ADMIN — WARFARIN SODIUM 1 MG: 1 TABLET ORAL at 17:15

## 2019-01-01 RX ADMIN — DEXTROSE MONOHYDRATE 50 ML/HR: 50 INJECTION, SOLUTION INTRAVENOUS at 15:19

## 2019-01-01 RX ADMIN — HYDROCORTISONE: 1 CREAM TOPICAL at 09:05

## 2019-01-01 RX ADMIN — CEFEPIME HYDROCHLORIDE 2 G: 2 INJECTION, POWDER, FOR SOLUTION INTRAVENOUS at 04:32

## 2019-01-01 RX ADMIN — SODIUM CHLORIDE 50 ML/HR: 9 INJECTION, SOLUTION INTRAVENOUS at 21:29

## 2019-01-01 RX ADMIN — ATROPINE SULFATE 2 DROP: 10 SOLUTION OPHTHALMIC at 11:50

## 2019-01-01 RX ADMIN — SODIUM CHLORIDE 100 ML/HR: 9 INJECTION, SOLUTION INTRAVENOUS at 17:00

## 2019-01-01 RX ADMIN — ATORVASTATIN CALCIUM 10 MG: 10 TABLET, FILM COATED ORAL at 20:49

## 2019-01-01 RX ADMIN — CEFEPIME HYDROCHLORIDE 2 G: 2 INJECTION, POWDER, FOR SOLUTION INTRAVENOUS at 15:44

## 2019-01-01 RX ADMIN — LEVOTHYROXINE SODIUM 50 MCG: 50 TABLET ORAL at 08:07

## 2019-01-01 RX ADMIN — AZITHROMYCIN 500 MG: 250 TABLET, FILM COATED ORAL at 17:17

## 2019-01-01 RX ADMIN — HYDROCORTISONE: 1 CREAM TOPICAL at 21:06

## 2019-01-01 RX ADMIN — LEVOTHYROXINE SODIUM 50 MCG: 50 TABLET ORAL at 09:06

## 2019-01-01 RX ADMIN — HYDROCORTISONE: 1 CREAM TOPICAL at 21:02

## 2019-01-01 RX ADMIN — HYDROCORTISONE: 1 CREAM TOPICAL at 08:35

## 2019-01-01 RX ADMIN — TAMSULOSIN HYDROCHLORIDE 0.4 MG: 0.4 CAPSULE ORAL at 21:21

## 2019-01-01 RX ADMIN — ATORVASTATIN CALCIUM 10 MG: 10 TABLET, FILM COATED ORAL at 20:21

## 2019-01-01 RX ADMIN — WARFARIN SODIUM 3 MG: 3 TABLET ORAL at 17:52

## 2019-01-01 RX ADMIN — DILTIAZEM HYDROCHLORIDE 240 MG: 240 CAPSULE, COATED, EXTENDED RELEASE ORAL at 19:58

## 2019-01-01 RX ADMIN — SODIUM CHLORIDE, PRESERVATIVE FREE 3 ML: 5 INJECTION INTRAVENOUS at 21:36

## 2019-01-01 RX ADMIN — TAMSULOSIN HYDROCHLORIDE 0.4 MG: 0.4 CAPSULE ORAL at 21:11

## 2019-01-01 RX ADMIN — FUROSEMIDE 20 MG: 20 TABLET ORAL at 17:15

## 2019-01-01 RX ADMIN — FERROUS SULFATE TAB EC 324 MG (65 MG FE EQUIVALENT) 324 MG: 324 (65 FE) TABLET DELAYED RESPONSE at 10:23

## 2019-01-01 RX ADMIN — SODIUM CHLORIDE 100 ML/HR: 9 INJECTION, SOLUTION INTRAVENOUS at 13:03

## 2019-01-01 RX ADMIN — FLECAINIDE ACETATE 50 MG: 50 TABLET ORAL at 08:01

## 2019-01-01 RX ADMIN — ASPIRIN 81 MG: 81 TABLET, COATED ORAL at 10:14

## 2019-01-01 RX ADMIN — HYDROCORTISONE: 1 CREAM TOPICAL at 08:54

## 2019-01-01 RX ADMIN — IPRATROPIUM BROMIDE AND ALBUTEROL SULFATE 3 ML: 2.5; .5 SOLUTION RESPIRATORY (INHALATION) at 16:35

## 2019-01-01 RX ADMIN — FOLIC ACID 1 MG: 1 TABLET ORAL at 09:11

## 2019-01-01 RX ADMIN — FOLIC ACID 1 MG: 1 TABLET ORAL at 08:15

## 2019-01-01 RX ADMIN — ATORVASTATIN CALCIUM 10 MG: 10 TABLET, FILM COATED ORAL at 20:22

## 2019-01-01 RX ADMIN — IPRATROPIUM BROMIDE AND ALBUTEROL SULFATE 3 ML: 2.5; .5 SOLUTION RESPIRATORY (INHALATION) at 19:01

## 2019-01-01 RX ADMIN — LEVOTHYROXINE SODIUM 50 MCG: 50 TABLET ORAL at 05:14

## 2019-01-01 RX ADMIN — IPRATROPIUM BROMIDE AND ALBUTEROL SULFATE 3 ML: 2.5; .5 SOLUTION RESPIRATORY (INHALATION) at 14:41

## 2019-01-01 RX ADMIN — DILTIAZEM HYDROCHLORIDE 240 MG: 240 CAPSULE, COATED, EXTENDED RELEASE ORAL at 21:11

## 2019-01-01 RX ADMIN — FLECAINIDE ACETATE 50 MG: 50 TABLET ORAL at 08:38

## 2019-01-01 RX ADMIN — DILTIAZEM HYDROCHLORIDE 240 MG: 240 CAPSULE, COATED, EXTENDED RELEASE ORAL at 20:23

## 2019-01-01 RX ADMIN — CEFEPIME HYDROCHLORIDE 2 G: 2 INJECTION, POWDER, FOR SOLUTION INTRAVENOUS at 17:10

## 2019-01-01 RX ADMIN — FERROUS SULFATE TAB EC 324 MG (65 MG FE EQUIVALENT) 324 MG: 324 (65 FE) TABLET DELAYED RESPONSE at 17:10

## 2019-01-01 RX ADMIN — FUROSEMIDE 20 MG: 20 TABLET ORAL at 08:07

## 2019-01-01 RX ADMIN — SODIUM CHLORIDE, PRESERVATIVE FREE 3 ML: 5 INJECTION INTRAVENOUS at 21:09

## 2019-01-01 RX ADMIN — IPRATROPIUM BROMIDE AND ALBUTEROL SULFATE 3 ML: 2.5; .5 SOLUTION RESPIRATORY (INHALATION) at 20:11

## 2019-01-01 RX ADMIN — TAMSULOSIN HYDROCHLORIDE 0.4 MG: 0.4 CAPSULE ORAL at 20:00

## 2019-01-01 RX ADMIN — ATORVASTATIN CALCIUM 10 MG: 10 TABLET, FILM COATED ORAL at 22:28

## 2019-01-01 RX ADMIN — FUROSEMIDE 40 MG: 40 TABLET ORAL at 15:47

## 2019-01-01 RX ADMIN — HYDROCORTISONE: 1 CREAM TOPICAL at 20:36

## 2019-01-01 RX ADMIN — IPRATROPIUM BROMIDE AND ALBUTEROL SULFATE 3 ML: 2.5; .5 SOLUTION RESPIRATORY (INHALATION) at 19:22

## 2019-01-01 RX ADMIN — IPRATROPIUM BROMIDE AND ALBUTEROL SULFATE 3 ML: 2.5; .5 SOLUTION RESPIRATORY (INHALATION) at 10:39

## 2019-01-01 RX ADMIN — SODIUM CHLORIDE, PRESERVATIVE FREE 3 ML: 5 INJECTION INTRAVENOUS at 20:21

## 2019-01-01 RX ADMIN — DILTIAZEM HYDROCHLORIDE 240 MG: 240 CAPSULE, COATED, EXTENDED RELEASE ORAL at 20:50

## 2019-01-01 RX ADMIN — IPRATROPIUM BROMIDE AND ALBUTEROL SULFATE 3 ML: 2.5; .5 SOLUTION RESPIRATORY (INHALATION) at 11:26

## 2019-01-01 RX ADMIN — IPRATROPIUM BROMIDE AND ALBUTEROL SULFATE 3 ML: 2.5; .5 SOLUTION RESPIRATORY (INHALATION) at 14:31

## 2019-01-01 RX ADMIN — IPRATROPIUM BROMIDE AND ALBUTEROL SULFATE 3 ML: 2.5; .5 SOLUTION RESPIRATORY (INHALATION) at 06:29

## 2019-01-01 RX ADMIN — ATORVASTATIN CALCIUM 10 MG: 10 TABLET, FILM COATED ORAL at 20:30

## 2019-01-01 RX ADMIN — ASPIRIN 81 MG: 81 TABLET, COATED ORAL at 09:11

## 2019-01-01 RX ADMIN — HYDROCORTISONE: 1 CREAM TOPICAL at 09:42

## 2019-01-01 RX ADMIN — IPRATROPIUM BROMIDE AND ALBUTEROL SULFATE 3 ML: 2.5; .5 SOLUTION RESPIRATORY (INHALATION) at 09:22

## 2019-01-01 RX ADMIN — IPRATROPIUM BROMIDE AND ALBUTEROL SULFATE 3 ML: 2.5; .5 SOLUTION RESPIRATORY (INHALATION) at 18:48

## 2019-01-01 RX ADMIN — DEXTROSE MONOHYDRATE 25 G: 25 INJECTION, SOLUTION INTRAVENOUS at 22:16

## 2019-01-01 RX ADMIN — METHYLPREDNISOLONE SODIUM SUCCINATE 20 MG: 40 INJECTION, POWDER, FOR SOLUTION INTRAMUSCULAR; INTRAVENOUS at 04:25

## 2019-01-01 RX ADMIN — IPRATROPIUM BROMIDE AND ALBUTEROL SULFATE 3 ML: 2.5; .5 SOLUTION RESPIRATORY (INHALATION) at 14:35

## 2019-01-01 RX ADMIN — FUROSEMIDE 20 MG: 20 TABLET ORAL at 17:19

## 2019-01-01 RX ADMIN — THERA TABS 1 TABLET: TAB at 09:02

## 2019-01-01 RX ADMIN — FLECAINIDE ACETATE 50 MG: 50 TABLET ORAL at 20:48

## 2019-01-01 RX ADMIN — IPRATROPIUM BROMIDE AND ALBUTEROL SULFATE 3 ML: 2.5; .5 SOLUTION RESPIRATORY (INHALATION) at 10:55

## 2019-01-01 RX ADMIN — TAMSULOSIN HYDROCHLORIDE 0.4 MG: 0.4 CAPSULE ORAL at 20:44

## 2019-01-01 RX ADMIN — FLECAINIDE ACETATE 50 MG: 50 TABLET ORAL at 08:07

## 2019-01-01 RX ADMIN — ATORVASTATIN CALCIUM 10 MG: 10 TABLET, FILM COATED ORAL at 21:08

## 2019-01-01 RX ADMIN — HYDROCORTISONE: 1 CREAM TOPICAL at 21:21

## 2019-01-01 RX ADMIN — TAMSULOSIN HYDROCHLORIDE 0.4 MG: 0.4 CAPSULE ORAL at 21:34

## 2019-01-01 RX ADMIN — TAMSULOSIN HYDROCHLORIDE 0.4 MG: 0.4 CAPSULE ORAL at 20:49

## 2019-01-01 RX ADMIN — CEFTRIAXONE SODIUM 1 G: 1 INJECTION, POWDER, FOR SOLUTION INTRAMUSCULAR; INTRAVENOUS at 17:55

## 2019-01-01 RX ADMIN — SODIUM CHLORIDE 75 ML/HR: 9 INJECTION, SOLUTION INTRAVENOUS at 15:49

## 2019-01-01 RX ADMIN — IPRATROPIUM BROMIDE AND ALBUTEROL SULFATE 3 ML: 2.5; .5 SOLUTION RESPIRATORY (INHALATION) at 10:54

## 2019-01-01 RX ADMIN — FUROSEMIDE 20 MG: 20 TABLET ORAL at 18:33

## 2019-01-01 RX ADMIN — CEFEPIME HYDROCHLORIDE 2 G: 2 INJECTION, POWDER, FOR SOLUTION INTRAVENOUS at 03:01

## 2019-01-01 RX ADMIN — HYDROCORTISONE: 1 CREAM TOPICAL at 20:24

## 2019-01-01 RX ADMIN — DEXTROSE MONOHYDRATE 50 ML: 25 INJECTION, SOLUTION INTRAVENOUS at 01:32

## 2019-01-01 RX ADMIN — CYANOCOBALAMIN TAB 1000 MCG 1000 MCG: 1000 TAB at 10:15

## 2019-01-01 RX ADMIN — HYDROCORTISONE: 1 CREAM TOPICAL at 10:32

## 2019-01-01 RX ADMIN — IPRATROPIUM BROMIDE AND ALBUTEROL SULFATE 3 ML: 2.5; .5 SOLUTION RESPIRATORY (INHALATION) at 19:11

## 2019-01-01 RX ADMIN — METHYLPREDNISOLONE SODIUM SUCCINATE 20 MG: 40 INJECTION, POWDER, FOR SOLUTION INTRAMUSCULAR; INTRAVENOUS at 20:47

## 2019-01-01 RX ADMIN — DILTIAZEM HYDROCHLORIDE 240 MG: 240 CAPSULE, COATED, EXTENDED RELEASE ORAL at 21:04

## 2019-01-01 RX ADMIN — FLECAINIDE ACETATE 50 MG: 50 TABLET ORAL at 22:28

## 2019-01-01 RX ADMIN — FERROUS SULFATE TAB EC 324 MG (65 MG FE EQUIVALENT) 324 MG: 324 (65 FE) TABLET DELAYED RESPONSE at 17:19

## 2019-01-01 RX ADMIN — AZITHROMYCIN 500 MG: 250 TABLET, FILM COATED ORAL at 19:05

## 2019-01-01 RX ADMIN — FUROSEMIDE 20 MG: 20 TABLET ORAL at 09:06

## 2019-01-01 RX ADMIN — PROPOFOL 20 MG: 10 INJECTION, EMULSION INTRAVENOUS at 19:45

## 2019-01-01 RX ADMIN — FUROSEMIDE 40 MG: 40 TABLET ORAL at 16:04

## 2019-01-01 RX ADMIN — FLECAINIDE ACETATE 50 MG: 50 TABLET ORAL at 08:57

## 2019-01-01 RX ADMIN — CYANOCOBALAMIN TAB 1000 MCG 1000 MCG: 1000 TAB at 08:15

## 2019-01-01 RX ADMIN — ATORVASTATIN CALCIUM 10 MG: 10 TABLET, FILM COATED ORAL at 22:01

## 2019-01-01 RX ADMIN — FLECAINIDE ACETATE 50 MG: 50 TABLET ORAL at 10:23

## 2019-01-01 RX ADMIN — DEXTROSE MONOHYDRATE 75 ML/HR: 50 INJECTION, SOLUTION INTRAVENOUS at 10:13

## 2019-01-01 RX ADMIN — DILTIAZEM HYDROCHLORIDE 240 MG: 240 CAPSULE, COATED, EXTENDED RELEASE ORAL at 21:30

## 2019-01-01 RX ADMIN — IPRATROPIUM BROMIDE AND ALBUTEROL SULFATE 3 ML: 2.5; .5 SOLUTION RESPIRATORY (INHALATION) at 19:14

## 2019-01-01 RX ADMIN — DILTIAZEM HYDROCHLORIDE 240 MG: 240 CAPSULE, COATED, EXTENDED RELEASE ORAL at 20:21

## 2019-01-01 RX ADMIN — POLYETHYLENE GLYCOL 3350 17 G: 17 POWDER, FOR SOLUTION ORAL at 09:07

## 2019-01-01 RX ADMIN — FERROUS SULFATE TAB EC 324 MG (65 MG FE EQUIVALENT) 324 MG: 324 (65 FE) TABLET DELAYED RESPONSE at 20:42

## 2019-01-01 RX ADMIN — ATORVASTATIN CALCIUM 10 MG: 10 TABLET, FILM COATED ORAL at 20:42

## 2019-01-01 RX ADMIN — TAMSULOSIN HYDROCHLORIDE 0.4 MG: 0.4 CAPSULE ORAL at 21:30

## 2019-01-01 RX ADMIN — MORPHINE SULFATE 1 MG: 2 INJECTION, SOLUTION INTRAMUSCULAR; INTRAVENOUS at 15:59

## 2019-01-01 RX ADMIN — IPRATROPIUM BROMIDE AND ALBUTEROL SULFATE 3 ML: 2.5; .5 SOLUTION RESPIRATORY (INHALATION) at 14:28

## 2019-01-01 RX ADMIN — ASPIRIN 81 MG: 81 TABLET, COATED ORAL at 10:24

## 2019-01-01 RX ADMIN — PROPOFOL 10 MG: 10 INJECTION, EMULSION INTRAVENOUS at 19:53

## 2019-01-01 RX ADMIN — FERROUS SULFATE TAB EC 324 MG (65 MG FE EQUIVALENT) 324 MG: 324 (65 FE) TABLET DELAYED RESPONSE at 09:11

## 2019-01-01 RX ADMIN — IPRATROPIUM BROMIDE AND ALBUTEROL SULFATE 3 ML: 2.5; .5 SOLUTION RESPIRATORY (INHALATION) at 10:49

## 2019-01-01 RX ADMIN — METHYLPREDNISOLONE SODIUM SUCCINATE 20 MG: 40 INJECTION, POWDER, FOR SOLUTION INTRAMUSCULAR; INTRAVENOUS at 20:58

## 2019-01-01 RX ADMIN — HYDROCORTISONE: 1 CREAM TOPICAL at 10:16

## 2019-01-01 RX ADMIN — FLECAINIDE ACETATE 50 MG: 50 TABLET ORAL at 21:21

## 2019-01-01 RX ADMIN — HYDROCORTISONE: 1 CREAM TOPICAL at 21:00

## 2019-01-01 RX ADMIN — HYDROCORTISONE: 1 CREAM TOPICAL at 20:45

## 2019-01-01 RX ADMIN — LEVOTHYROXINE SODIUM 50 MCG: 50 TABLET ORAL at 08:37

## 2019-01-01 RX ADMIN — DEXTROSE MONOHYDRATE 50 ML/HR: 50 INJECTION, SOLUTION INTRAVENOUS at 21:29

## 2019-01-01 RX ADMIN — FERROUS SULFATE TAB EC 324 MG (65 MG FE EQUIVALENT) 324 MG: 324 (65 FE) TABLET DELAYED RESPONSE at 18:33

## 2019-01-01 RX ADMIN — FUROSEMIDE 20 MG: 20 TABLET ORAL at 18:05

## 2019-01-01 RX ADMIN — CEFTRIAXONE SODIUM 1 G: 1 INJECTION, POWDER, FOR SOLUTION INTRAMUSCULAR; INTRAVENOUS at 17:22

## 2019-01-01 RX ADMIN — POTASSIUM CHLORIDE 40 MEQ: 750 CAPSULE, EXTENDED RELEASE ORAL at 12:42

## 2019-01-01 RX ADMIN — BACITRACIN: 500 OINTMENT TOPICAL at 09:16

## 2019-01-01 RX ADMIN — DILTIAZEM HYDROCHLORIDE 240 MG: 240 CAPSULE, COATED, EXTENDED RELEASE ORAL at 20:29

## 2019-01-01 RX ADMIN — IPRATROPIUM BROMIDE AND ALBUTEROL SULFATE 3 ML: 2.5; .5 SOLUTION RESPIRATORY (INHALATION) at 08:07

## 2019-01-01 RX ADMIN — SODIUM CHLORIDE, PRESERVATIVE FREE 3 ML: 5 INJECTION INTRAVENOUS at 08:35

## 2019-01-01 RX ADMIN — CEFEPIME HYDROCHLORIDE 2 G: 2 INJECTION, POWDER, FOR SOLUTION INTRAVENOUS at 14:58

## 2019-01-01 RX ADMIN — FUROSEMIDE 20 MG: 20 TABLET ORAL at 17:52

## 2019-01-01 RX ADMIN — SODIUM CHLORIDE, PRESERVATIVE FREE 3 ML: 5 INJECTION INTRAVENOUS at 10:09

## 2019-01-01 RX ADMIN — MORPHINE SULFATE 1 MG: 2 INJECTION, SOLUTION INTRAMUSCULAR; INTRAVENOUS at 13:20

## 2019-01-01 RX ADMIN — HYDROCORTISONE: 1 CREAM TOPICAL at 21:31

## 2019-01-01 RX ADMIN — DILTIAZEM HYDROCHLORIDE 240 MG: 240 CAPSULE, COATED, EXTENDED RELEASE ORAL at 21:05

## 2019-01-01 RX ADMIN — FUROSEMIDE 20 MG: 20 TABLET ORAL at 09:11

## 2019-01-01 RX ADMIN — LEVOFLOXACIN 500 MG: 5 INJECTION, SOLUTION INTRAVENOUS at 18:54

## 2019-01-01 RX ADMIN — LORAZEPAM 0.5 MG: 2 INJECTION, SOLUTION INTRAMUSCULAR; INTRAVENOUS at 04:28

## 2019-01-01 RX ADMIN — THERA TABS 1 TABLET: TAB at 08:01

## 2019-01-01 RX ADMIN — DILTIAZEM HYDROCHLORIDE 240 MG: 240 CAPSULE, COATED, EXTENDED RELEASE ORAL at 22:01

## 2019-01-01 RX ADMIN — HYDROCORTISONE: 1 CREAM TOPICAL at 09:10

## 2019-01-01 RX ADMIN — CYANOCOBALAMIN TAB 1000 MCG 1000 MCG: 1000 TAB at 09:09

## 2019-01-01 RX ADMIN — SODIUM CHLORIDE 100 ML/HR: 9 INJECTION, SOLUTION INTRAVENOUS at 04:02

## 2019-01-01 RX ADMIN — IPRATROPIUM BROMIDE AND ALBUTEROL SULFATE 3 ML: 2.5; .5 SOLUTION RESPIRATORY (INHALATION) at 19:44

## 2019-01-01 RX ADMIN — FLECAINIDE ACETATE 50 MG: 50 TABLET ORAL at 08:12

## 2019-01-01 RX ADMIN — CYANOCOBALAMIN TAB 1000 MCG 1000 MCG: 1000 TAB at 09:04

## 2019-01-01 RX ADMIN — BACITRACIN: 500 OINTMENT TOPICAL at 09:00

## 2019-01-01 RX ADMIN — DILTIAZEM HYDROCHLORIDE 240 MG: 240 CAPSULE, COATED, EXTENDED RELEASE ORAL at 21:21

## 2019-01-01 RX ADMIN — SODIUM CHLORIDE, PRESERVATIVE FREE 3 ML: 5 INJECTION INTRAVENOUS at 20:49

## 2019-01-01 RX ADMIN — IPRATROPIUM BROMIDE AND ALBUTEROL SULFATE 3 ML: 2.5; .5 SOLUTION RESPIRATORY (INHALATION) at 15:12

## 2019-01-01 RX ADMIN — IPRATROPIUM BROMIDE AND ALBUTEROL SULFATE 3 ML: 2.5; .5 SOLUTION RESPIRATORY (INHALATION) at 21:08

## 2019-01-01 RX ADMIN — IPRATROPIUM BROMIDE AND ALBUTEROL SULFATE 3 ML: 2.5; .5 SOLUTION RESPIRATORY (INHALATION) at 18:41

## 2019-01-01 RX ADMIN — SCOPALAMINE 1 PATCH: 1 PATCH, EXTENDED RELEASE TRANSDERMAL at 11:25

## 2019-01-01 RX ADMIN — FUROSEMIDE 20 MG: 20 TABLET ORAL at 17:10

## 2019-01-01 RX ADMIN — ATORVASTATIN CALCIUM 10 MG: 10 TABLET, FILM COATED ORAL at 19:31

## 2019-01-01 RX ADMIN — FLECAINIDE ACETATE 50 MG: 50 TABLET ORAL at 09:42

## 2019-01-01 RX ADMIN — FLECAINIDE ACETATE 50 MG: 50 TABLET ORAL at 09:16

## 2019-01-01 RX ADMIN — FUROSEMIDE 40 MG: 40 TABLET ORAL at 17:19

## 2019-01-01 RX ADMIN — LEVOTHYROXINE SODIUM 50 MCG: 50 TABLET ORAL at 08:12

## 2019-01-01 RX ADMIN — IPRATROPIUM BROMIDE AND ALBUTEROL SULFATE 3 ML: 2.5; .5 SOLUTION RESPIRATORY (INHALATION) at 20:34

## 2019-01-01 RX ADMIN — WARFARIN SODIUM 1 MG: 1 TABLET ORAL at 17:10

## 2019-01-01 RX ADMIN — FOLIC ACID 1 MG: 1 TABLET ORAL at 10:14

## 2019-01-01 RX ADMIN — FLECAINIDE ACETATE 50 MG: 50 TABLET ORAL at 20:00

## 2019-01-01 RX ADMIN — HYDROCORTISONE: 1 CREAM TOPICAL at 21:35

## 2019-01-01 RX ADMIN — CEFEPIME HYDROCHLORIDE 2 G: 2 INJECTION, POWDER, FOR SOLUTION INTRAVENOUS at 14:18

## 2019-01-01 RX ADMIN — FERROUS SULFATE TAB EC 324 MG (65 MG FE EQUIVALENT) 324 MG: 324 (65 FE) TABLET DELAYED RESPONSE at 08:38

## 2019-01-01 RX ADMIN — FLECAINIDE ACETATE 50 MG: 50 TABLET ORAL at 21:08

## 2019-01-01 RX ADMIN — FERROUS SULFATE TAB EC 324 MG (65 MG FE EQUIVALENT) 324 MG: 324 (65 FE) TABLET DELAYED RESPONSE at 18:38

## 2019-01-01 RX ADMIN — FLUCONAZOLE 100 MG: 100 TABLET ORAL at 09:36

## 2019-01-01 RX ADMIN — CYANOCOBALAMIN TAB 1000 MCG 1000 MCG: 1000 TAB at 09:11

## 2019-01-01 RX ADMIN — IPRATROPIUM BROMIDE AND ALBUTEROL SULFATE 3 ML: 2.5; .5 SOLUTION RESPIRATORY (INHALATION) at 08:44

## 2019-01-01 RX ADMIN — ASPIRIN 81 MG: 81 TABLET, COATED ORAL at 09:15

## 2019-01-01 RX ADMIN — MEROPENEM 1 G: 1 INJECTION, POWDER, FOR SOLUTION INTRAVENOUS at 01:15

## 2019-01-01 RX ADMIN — ATORVASTATIN CALCIUM 10 MG: 10 TABLET, FILM COATED ORAL at 20:27

## 2019-01-01 RX ADMIN — LEVOTHYROXINE SODIUM 50 MCG: 50 TABLET ORAL at 09:15

## 2019-01-01 RX ADMIN — METHYLPREDNISOLONE SODIUM SUCCINATE 20 MG: 40 INJECTION, POWDER, FOR SOLUTION INTRAMUSCULAR; INTRAVENOUS at 04:58

## 2019-01-01 RX ADMIN — FOLIC ACID 1 MG: 1 TABLET ORAL at 09:09

## 2019-01-01 RX ADMIN — HYDROCORTISONE: 1 CREAM TOPICAL at 08:38

## 2019-01-01 RX ADMIN — FLECAINIDE ACETATE 50 MG: 50 TABLET ORAL at 20:30

## 2019-01-01 RX ADMIN — ATORVASTATIN CALCIUM 10 MG: 10 TABLET, FILM COATED ORAL at 20:47

## 2019-01-01 RX ADMIN — WARFARIN SODIUM 2.5 MG: 2.5 TABLET ORAL at 17:22

## 2019-01-01 RX ADMIN — CYANOCOBALAMIN TAB 1000 MCG 1000 MCG: 1000 TAB at 08:17

## 2019-01-01 RX ADMIN — FLECAINIDE ACETATE 50 MG: 50 TABLET ORAL at 21:05

## 2019-01-01 RX ADMIN — FUROSEMIDE 20 MG: 20 TABLET ORAL at 10:23

## 2019-01-01 RX ADMIN — CEFEPIME HYDROCHLORIDE 2 G: 2 INJECTION, POWDER, FOR SOLUTION INTRAVENOUS at 03:39

## 2019-01-01 RX ADMIN — FUROSEMIDE 20 MG: 20 TABLET ORAL at 09:16

## 2019-01-01 RX ADMIN — METHYLPREDNISOLONE SODIUM SUCCINATE 40 MG: 40 INJECTION, POWDER, FOR SOLUTION INTRAMUSCULAR; INTRAVENOUS at 12:52

## 2019-01-01 RX ADMIN — FERROUS SULFATE TAB EC 324 MG (65 MG FE EQUIVALENT) 324 MG: 324 (65 FE) TABLET DELAYED RESPONSE at 09:06

## 2019-01-01 RX ADMIN — HYDROCORTISONE: 1 CREAM TOPICAL at 22:20

## 2019-01-01 RX ADMIN — CEFUROXIME AXETIL 250 MG: 250 TABLET ORAL at 09:36

## 2019-01-01 RX ADMIN — LORAZEPAM 0.5 MG: 2 INJECTION, SOLUTION INTRAMUSCULAR; INTRAVENOUS at 13:19

## 2019-01-01 RX ADMIN — FLECAINIDE ACETATE 50 MG: 50 TABLET ORAL at 09:05

## 2019-01-01 RX ADMIN — WARFARIN SODIUM 3 MG: 3 TABLET ORAL at 17:45

## 2019-01-01 RX ADMIN — IPRATROPIUM BROMIDE AND ALBUTEROL SULFATE 3 ML: 2.5; .5 SOLUTION RESPIRATORY (INHALATION) at 10:26

## 2019-01-01 RX ADMIN — BACITRACIN: 500 OINTMENT TOPICAL at 10:16

## 2019-01-01 RX ADMIN — CEFTRIAXONE SODIUM 1 G: 1 INJECTION, POWDER, FOR SOLUTION INTRAMUSCULAR; INTRAVENOUS at 17:52

## 2019-01-01 RX ADMIN — FLECAINIDE ACETATE 50 MG: 50 TABLET ORAL at 09:10

## 2019-01-01 RX ADMIN — MEROPENEM 1 G: 1 INJECTION, POWDER, FOR SOLUTION INTRAVENOUS at 02:54

## 2019-01-01 RX ADMIN — MORPHINE SULFATE 2 MG: 2 INJECTION, SOLUTION INTRAMUSCULAR; INTRAVENOUS at 12:00

## 2019-01-01 RX ADMIN — IPRATROPIUM BROMIDE AND ALBUTEROL SULFATE 3 ML: 2.5; .5 SOLUTION RESPIRATORY (INHALATION) at 15:40

## 2019-01-01 RX ADMIN — FUROSEMIDE 20 MG: 20 TABLET ORAL at 08:15

## 2019-01-01 RX ADMIN — TAMSULOSIN HYDROCHLORIDE 0.4 MG: 0.4 CAPSULE ORAL at 20:48

## 2019-01-01 RX ADMIN — FERROUS SULFATE TAB EC 324 MG (65 MG FE EQUIVALENT) 324 MG: 324 (65 FE) TABLET DELAYED RESPONSE at 10:22

## 2019-01-01 RX ADMIN — HYDROCORTISONE: 1 CREAM TOPICAL at 09:17

## 2019-01-01 RX ADMIN — IPRATROPIUM BROMIDE AND ALBUTEROL SULFATE 3 ML: 2.5; .5 SOLUTION RESPIRATORY (INHALATION) at 19:56

## 2019-01-01 RX ADMIN — FUROSEMIDE 20 MG: 10 INJECTION, SOLUTION INTRAVENOUS at 22:06

## 2019-01-01 RX ADMIN — HYDROCORTISONE: 1 CREAM TOPICAL at 21:37

## 2019-01-01 RX ADMIN — CEFUROXIME AXETIL 250 MG: 250 TABLET ORAL at 12:30

## 2019-01-01 RX ADMIN — FUROSEMIDE 20 MG: 20 TABLET ORAL at 10:22

## 2019-01-01 RX ADMIN — WARFARIN SODIUM 3 MG: 3 TABLET ORAL at 17:14

## 2019-01-01 RX ADMIN — FLECAINIDE ACETATE 50 MG: 50 TABLET ORAL at 09:09

## 2019-01-01 RX ADMIN — CEFEPIME HYDROCHLORIDE 2 G: 2 INJECTION, POWDER, FOR SOLUTION INTRAVENOUS at 02:06

## 2019-01-01 RX ADMIN — CYANOCOBALAMIN TAB 1000 MCG 1000 MCG: 1000 TAB at 09:15

## 2019-01-01 RX ADMIN — HYDROCORTISONE: 1 CREAM TOPICAL at 09:12

## 2019-01-01 RX ADMIN — CEFEPIME HYDROCHLORIDE 2 G: 2 INJECTION, POWDER, FOR SOLUTION INTRAVENOUS at 02:46

## 2019-01-01 RX ADMIN — BACITRACIN: 500 OINTMENT TOPICAL at 08:54

## 2019-01-01 RX ADMIN — IPRATROPIUM BROMIDE AND ALBUTEROL SULFATE 3 ML: 2.5; .5 SOLUTION RESPIRATORY (INHALATION) at 14:59

## 2019-01-01 RX ADMIN — CEFEPIME HYDROCHLORIDE 2 G: 2 INJECTION, POWDER, FOR SOLUTION INTRAVENOUS at 02:14

## 2019-01-01 RX ADMIN — ATORVASTATIN CALCIUM 10 MG: 10 TABLET, FILM COATED ORAL at 21:11

## 2019-01-01 RX ADMIN — WARFARIN SODIUM 1 MG: 1 TABLET ORAL at 17:19

## 2019-01-01 RX ADMIN — SODIUM CHLORIDE, PRESERVATIVE FREE 10 ML: 5 INJECTION INTRAVENOUS at 09:07

## 2019-01-01 RX ADMIN — PROPOFOL 30 MG: 10 INJECTION, EMULSION INTRAVENOUS at 19:49

## 2019-01-01 RX ADMIN — TAMSULOSIN HYDROCHLORIDE 0.4 MG: 0.4 CAPSULE ORAL at 22:28

## 2019-01-01 RX ADMIN — AZITHROMYCIN 500 MG: 250 TABLET, FILM COATED ORAL at 18:06

## 2019-01-01 RX ADMIN — LEVOTHYROXINE SODIUM 50 MCG: 50 TABLET ORAL at 06:01

## 2019-01-01 RX ADMIN — SODIUM CHLORIDE, PRESERVATIVE FREE 3 ML: 5 INJECTION INTRAVENOUS at 09:34

## 2019-01-01 RX ADMIN — LEVOTHYROXINE SODIUM 50 MCG: 50 TABLET ORAL at 06:09

## 2019-01-01 RX ADMIN — TAMSULOSIN HYDROCHLORIDE 0.4 MG: 0.4 CAPSULE ORAL at 20:57

## 2019-01-01 RX ADMIN — DILTIAZEM HYDROCHLORIDE 240 MG: 240 CAPSULE, COATED, EXTENDED RELEASE ORAL at 20:48

## 2019-01-01 RX ADMIN — TAMSULOSIN HYDROCHLORIDE 0.4 MG: 0.4 CAPSULE ORAL at 21:08

## 2019-01-01 RX ADMIN — SODIUM CHLORIDE: 9 INJECTION, SOLUTION INTRAVENOUS at 18:46

## 2019-01-01 RX ADMIN — MORPHINE SULFATE 1 MG: 2 INJECTION, SOLUTION INTRAMUSCULAR; INTRAVENOUS at 06:59

## 2019-01-01 RX ADMIN — ASPIRIN 81 MG: 81 TABLET, COATED ORAL at 09:09

## 2019-01-01 RX ADMIN — FUROSEMIDE 40 MG: 40 TABLET ORAL at 21:21

## 2019-01-01 RX ADMIN — IPRATROPIUM BROMIDE AND ALBUTEROL SULFATE 3 ML: 2.5; .5 SOLUTION RESPIRATORY (INHALATION) at 14:54

## 2019-01-01 RX ADMIN — IPRATROPIUM BROMIDE AND ALBUTEROL SULFATE 3 ML: 2.5; .5 SOLUTION RESPIRATORY (INHALATION) at 07:29

## 2019-01-01 RX ADMIN — CEFTRIAXONE SODIUM 1 G: 1 INJECTION, POWDER, FOR SOLUTION INTRAMUSCULAR; INTRAVENOUS at 17:21

## 2019-01-01 RX ADMIN — SODIUM CHLORIDE 100 ML/HR: 9 INJECTION, SOLUTION INTRAVENOUS at 08:13

## 2019-01-01 RX ADMIN — SODIUM CHLORIDE, PRESERVATIVE FREE 3 ML: 5 INJECTION INTRAVENOUS at 20:24

## 2019-01-01 RX ADMIN — FUROSEMIDE 40 MG: 40 TABLET ORAL at 21:34

## 2019-01-01 RX ADMIN — ASPIRIN 81 MG: 81 TABLET, COATED ORAL at 09:07

## 2019-01-01 RX ADMIN — FUROSEMIDE 20 MG: 20 TABLET ORAL at 20:22

## 2019-01-01 RX ADMIN — FUROSEMIDE 20 MG: 20 TABLET ORAL at 17:53

## 2019-01-01 RX ADMIN — BACITRACIN: 500 OINTMENT TOPICAL at 08:35

## 2019-01-01 RX ADMIN — IPRATROPIUM BROMIDE AND ALBUTEROL SULFATE 3 ML: 2.5; .5 SOLUTION RESPIRATORY (INHALATION) at 15:04

## 2019-01-01 RX ADMIN — PROPOFOL 10 MG: 10 INJECTION, EMULSION INTRAVENOUS at 19:00

## 2019-01-01 RX ADMIN — CEFTRIAXONE SODIUM 1 G: 1 INJECTION, POWDER, FOR SOLUTION INTRAMUSCULAR; INTRAVENOUS at 17:01

## 2019-01-01 RX ADMIN — LEVOTHYROXINE SODIUM 50 MCG: 50 TABLET ORAL at 06:00

## 2019-01-01 RX ADMIN — TAMSULOSIN HYDROCHLORIDE 0.4 MG: 0.4 CAPSULE ORAL at 20:50

## 2019-01-01 RX ADMIN — PROPOFOL 30 MG: 10 INJECTION, EMULSION INTRAVENOUS at 19:42

## 2019-01-01 RX ADMIN — FUROSEMIDE 20 MG: 20 TABLET ORAL at 10:24

## 2019-01-01 RX ADMIN — FERROUS SULFATE TAB EC 324 MG (65 MG FE EQUIVALENT) 324 MG: 324 (65 FE) TABLET DELAYED RESPONSE at 09:36

## 2019-01-01 RX ADMIN — IPRATROPIUM BROMIDE AND ALBUTEROL SULFATE 3 ML: 2.5; .5 SOLUTION RESPIRATORY (INHALATION) at 12:15

## 2019-01-01 RX ADMIN — SODIUM CHLORIDE, PRESERVATIVE FREE 10 ML: 5 INJECTION INTRAVENOUS at 17:00

## 2019-01-01 RX ADMIN — IPRATROPIUM BROMIDE AND ALBUTEROL SULFATE 3 ML: 2.5; .5 SOLUTION RESPIRATORY (INHALATION) at 10:44

## 2019-01-01 RX ADMIN — CEFEPIME HYDROCHLORIDE 2 G: 2 INJECTION, POWDER, FOR SOLUTION INTRAVENOUS at 16:15

## 2019-01-01 RX ADMIN — ATORVASTATIN CALCIUM 10 MG: 10 TABLET, FILM COATED ORAL at 20:23

## 2019-01-01 RX ADMIN — CEFEPIME HYDROCHLORIDE 2 G: 2 INJECTION, POWDER, FOR SOLUTION INTRAVENOUS at 15:08

## 2019-01-01 RX ADMIN — TAMSULOSIN HYDROCHLORIDE 0.4 MG: 0.4 CAPSULE ORAL at 21:04

## 2019-01-01 RX ADMIN — LEVOTHYROXINE SODIUM 50 MCG: 50 TABLET ORAL at 06:45

## 2019-01-01 RX ADMIN — FLECAINIDE ACETATE 50 MG: 50 TABLET ORAL at 21:30

## 2019-01-01 RX ADMIN — SODIUM CHLORIDE, PRESERVATIVE FREE 3 ML: 5 INJECTION INTRAVENOUS at 21:04

## 2019-01-01 RX ADMIN — METHYLPREDNISOLONE SODIUM SUCCINATE 40 MG: 40 INJECTION, POWDER, FOR SOLUTION INTRAMUSCULAR; INTRAVENOUS at 20:35

## 2019-01-01 RX ADMIN — SODIUM CHLORIDE, PRESERVATIVE FREE 3 ML: 5 INJECTION INTRAVENOUS at 21:02

## 2019-01-01 RX ADMIN — SODIUM CHLORIDE 250 ML: 9 INJECTION, SOLUTION INTRAVENOUS at 09:30

## 2019-01-01 RX ADMIN — IPRATROPIUM BROMIDE AND ALBUTEROL SULFATE 3 ML: 2.5; .5 SOLUTION RESPIRATORY (INHALATION) at 14:32

## 2019-01-01 RX ADMIN — BACITRACIN: 500 OINTMENT TOPICAL at 17:17

## 2019-01-01 RX ADMIN — IPRATROPIUM BROMIDE AND ALBUTEROL SULFATE 3 ML: 2.5; .5 SOLUTION RESPIRATORY (INHALATION) at 11:41

## 2019-01-01 RX ADMIN — HYDROCORTISONE: 1 CREAM TOPICAL at 09:16

## 2019-01-01 RX ADMIN — CEFTRIAXONE SODIUM 1 G: 1 INJECTION, POWDER, FOR SOLUTION INTRAMUSCULAR; INTRAVENOUS at 18:09

## 2019-01-01 RX ADMIN — HYDROCORTISONE: 1 CREAM TOPICAL at 20:30

## 2019-01-01 RX ADMIN — BACITRACIN: 500 OINTMENT TOPICAL at 10:25

## 2019-01-01 RX ADMIN — FLECAINIDE ACETATE 50 MG: 50 TABLET ORAL at 09:24

## 2019-01-01 RX ADMIN — IPRATROPIUM BROMIDE AND ALBUTEROL SULFATE 3 ML: 2.5; .5 SOLUTION RESPIRATORY (INHALATION) at 07:11

## 2019-01-01 RX ADMIN — CEFTRIAXONE SODIUM 1 G: 1 INJECTION, POWDER, FOR SOLUTION INTRAMUSCULAR; INTRAVENOUS at 18:24

## 2019-01-01 RX ADMIN — DEXTROSE MONOHYDRATE 50 ML/HR: 50 INJECTION, SOLUTION INTRAVENOUS at 10:40

## 2019-01-01 RX ADMIN — FUROSEMIDE 20 MG: 20 TABLET ORAL at 10:14

## 2019-01-01 RX ADMIN — FLUCONAZOLE 100 MG: 100 TABLET ORAL at 12:30

## 2019-01-01 RX ADMIN — LIDOCAINE HYDROCHLORIDE 30 MG: 10 INJECTION, SOLUTION INFILTRATION; PERINEURAL at 19:38

## 2019-01-01 RX ADMIN — FLECAINIDE ACETATE 50 MG: 50 TABLET ORAL at 09:04

## 2019-01-01 RX ADMIN — FERROUS SULFATE TAB EC 324 MG (65 MG FE EQUIVALENT) 324 MG: 324 (65 FE) TABLET DELAYED RESPONSE at 17:15

## 2019-01-01 RX ADMIN — BACITRACIN: 500 OINTMENT TOPICAL at 09:13

## 2019-01-01 RX ADMIN — TAMSULOSIN HYDROCHLORIDE 0.4 MG: 0.4 CAPSULE ORAL at 20:27

## 2019-01-01 RX ADMIN — SODIUM CHLORIDE, PRESERVATIVE FREE 10 ML: 5 INJECTION INTRAVENOUS at 12:52

## 2019-01-01 RX ADMIN — THERA TABS 1 TABLET: TAB at 09:36

## 2019-01-01 RX ADMIN — CEFEPIME HYDROCHLORIDE 2 G: 2 INJECTION, POWDER, FOR SOLUTION INTRAVENOUS at 17:13

## 2019-01-01 RX ADMIN — ATORVASTATIN CALCIUM 10 MG: 10 TABLET, FILM COATED ORAL at 21:04

## 2019-01-01 RX ADMIN — DEXTROSE AND SODIUM CHLORIDE 75 ML/HR: 5; 450 INJECTION, SOLUTION INTRAVENOUS at 12:52

## 2019-01-01 RX ADMIN — POLYETHYLENE GLYCOL 3350 17 G: 17 POWDER, FOR SOLUTION ORAL at 10:14

## 2019-01-01 RX ADMIN — ATORVASTATIN CALCIUM 10 MG: 10 TABLET, FILM COATED ORAL at 20:57

## 2019-01-01 RX ADMIN — IPRATROPIUM BROMIDE AND ALBUTEROL SULFATE 3 ML: 2.5; .5 SOLUTION RESPIRATORY (INHALATION) at 16:47

## 2019-01-01 RX ADMIN — SODIUM CHLORIDE, PRESERVATIVE FREE 3 ML: 5 INJECTION INTRAVENOUS at 20:47

## 2019-01-01 RX ADMIN — IPRATROPIUM BROMIDE AND ALBUTEROL SULFATE 3 ML: 2.5; .5 SOLUTION RESPIRATORY (INHALATION) at 11:49

## 2019-01-01 RX ADMIN — DEXTROSE AND SODIUM CHLORIDE 75 ML/HR: 5; 450 INJECTION, SOLUTION INTRAVENOUS at 01:49

## 2019-01-01 RX ADMIN — SODIUM CHLORIDE, PRESERVATIVE FREE 3 ML: 5 INJECTION INTRAVENOUS at 20:01

## 2019-01-01 RX ADMIN — FLECAINIDE ACETATE 50 MG: 50 TABLET ORAL at 20:57

## 2019-01-01 RX ADMIN — FLECAINIDE ACETATE 50 MG: 50 TABLET ORAL at 21:04

## 2019-01-01 RX ADMIN — TAMSULOSIN HYDROCHLORIDE 0.4 MG: 0.4 CAPSULE ORAL at 22:01

## 2019-01-01 RX ADMIN — IPRATROPIUM BROMIDE AND ALBUTEROL SULFATE 3 ML: 2.5; .5 SOLUTION RESPIRATORY (INHALATION) at 06:55

## 2019-01-01 RX ADMIN — METOPROLOL TARTRATE 5 MG: 5 INJECTION INTRAVENOUS at 22:55

## 2019-01-01 RX ADMIN — PROPOFOL 10 MG: 10 INJECTION, EMULSION INTRAVENOUS at 19:03

## 2019-01-01 RX ADMIN — ATORVASTATIN CALCIUM 10 MG: 10 TABLET, FILM COATED ORAL at 21:34

## 2019-01-01 RX ADMIN — FOLIC ACID 1 MG: 1 TABLET ORAL at 08:38

## 2019-01-01 RX ADMIN — MORPHINE SULFATE 1 MG: 2 INJECTION, SOLUTION INTRAMUSCULAR; INTRAVENOUS at 21:01

## 2019-01-01 RX ADMIN — IPRATROPIUM BROMIDE AND ALBUTEROL SULFATE 3 ML: 2.5; .5 SOLUTION RESPIRATORY (INHALATION) at 08:19

## 2019-01-01 RX ADMIN — LEVOTHYROXINE SODIUM 50 MCG: 50 TABLET ORAL at 10:22

## 2019-01-01 RX ADMIN — SODIUM CHLORIDE, PRESERVATIVE FREE 3 ML: 5 INJECTION INTRAVENOUS at 10:23

## 2019-01-01 RX ADMIN — WARFARIN SODIUM 3 MG: 3 TABLET ORAL at 17:21

## 2019-01-01 RX ADMIN — FERROUS SULFATE TAB EC 324 MG (65 MG FE EQUIVALENT) 324 MG: 324 (65 FE) TABLET DELAYED RESPONSE at 18:05

## 2019-01-01 RX ADMIN — PROPOFOL 10 MG: 10 INJECTION, EMULSION INTRAVENOUS at 19:47

## 2019-01-01 RX ADMIN — FLECAINIDE ACETATE 50 MG: 50 TABLET ORAL at 21:29

## 2019-01-01 RX ADMIN — FLECAINIDE ACETATE 50 MG: 50 TABLET ORAL at 20:21

## 2019-01-01 RX ADMIN — IPRATROPIUM BROMIDE AND ALBUTEROL SULFATE 3 ML: 2.5; .5 SOLUTION RESPIRATORY (INHALATION) at 07:28

## 2019-01-01 RX ADMIN — IPRATROPIUM BROMIDE AND ALBUTEROL SULFATE 3 ML: 2.5; .5 SOLUTION RESPIRATORY (INHALATION) at 06:33

## 2019-01-01 RX ADMIN — SODIUM CHLORIDE, PRESERVATIVE FREE 3 ML: 5 INJECTION INTRAVENOUS at 09:11

## 2019-01-01 RX ADMIN — LEVOTHYROXINE SODIUM 50 MCG: 50 TABLET ORAL at 08:57

## 2019-01-01 RX ADMIN — SODIUM CHLORIDE, PRESERVATIVE FREE 3 ML: 5 INJECTION INTRAVENOUS at 10:24

## 2019-01-01 RX ADMIN — BACITRACIN: 500 OINTMENT TOPICAL at 09:33

## 2019-01-01 RX ADMIN — SODIUM CHLORIDE, PRESERVATIVE FREE 10 ML: 5 INJECTION INTRAVENOUS at 17:10

## 2019-01-01 RX ADMIN — IPRATROPIUM BROMIDE AND ALBUTEROL SULFATE 3 ML: 2.5; .5 SOLUTION RESPIRATORY (INHALATION) at 20:22

## 2019-01-01 RX ADMIN — CYANOCOBALAMIN TAB 1000 MCG 1000 MCG: 1000 TAB at 19:06

## 2019-01-01 RX ADMIN — SODIUM CHLORIDE, POTASSIUM CHLORIDE, SODIUM LACTATE AND CALCIUM CHLORIDE 75 ML/HR: 600; 310; 30; 20 INJECTION, SOLUTION INTRAVENOUS at 11:51

## 2019-01-01 RX ADMIN — METHYLPREDNISOLONE SODIUM SUCCINATE 40 MG: 40 INJECTION, POWDER, FOR SOLUTION INTRAMUSCULAR; INTRAVENOUS at 15:01

## 2019-01-01 RX ADMIN — HYDROCORTISONE: 1 CREAM TOPICAL at 20:57

## 2019-01-01 RX ADMIN — ATORVASTATIN CALCIUM 10 MG: 10 TABLET, FILM COATED ORAL at 20:02

## 2019-01-01 RX ADMIN — IPRATROPIUM BROMIDE AND ALBUTEROL SULFATE 3 ML: 2.5; .5 SOLUTION RESPIRATORY (INHALATION) at 21:15

## 2019-01-01 RX ADMIN — DILTIAZEM HYDROCHLORIDE 240 MG: 240 CAPSULE, COATED, EXTENDED RELEASE ORAL at 21:34

## 2019-01-01 RX ADMIN — PROPOFOL 10 MG: 10 INJECTION, EMULSION INTRAVENOUS at 18:57

## 2019-01-01 RX ADMIN — CYANOCOBALAMIN TAB 1000 MCG 1000 MCG: 1000 TAB at 10:23

## 2019-01-01 RX ADMIN — FERROUS SULFATE TAB EC 324 MG (65 MG FE EQUIVALENT) 324 MG: 324 (65 FE) TABLET DELAYED RESPONSE at 17:32

## 2019-01-01 RX ADMIN — IPRATROPIUM BROMIDE AND ALBUTEROL SULFATE 3 ML: 2.5; .5 SOLUTION RESPIRATORY (INHALATION) at 06:54

## 2019-01-01 RX ADMIN — HYDROCORTISONE: 1 CREAM TOPICAL at 20:47

## 2019-01-01 RX ADMIN — IPRATROPIUM BROMIDE AND ALBUTEROL SULFATE 3 ML: 2.5; .5 SOLUTION RESPIRATORY (INHALATION) at 15:41

## 2019-01-01 RX ADMIN — MEROPENEM 1 G: 1 INJECTION, POWDER, FOR SOLUTION INTRAVENOUS at 21:35

## 2019-01-01 RX ADMIN — IPRATROPIUM BROMIDE AND ALBUTEROL SULFATE 3 ML: 2.5; .5 SOLUTION RESPIRATORY (INHALATION) at 12:02

## 2019-01-01 RX ADMIN — BACITRACIN: 500 OINTMENT TOPICAL at 09:20

## 2019-01-01 RX ADMIN — MORPHINE SULFATE 1 MG: 2 INJECTION, SOLUTION INTRAMUSCULAR; INTRAVENOUS at 06:18

## 2019-01-01 RX ADMIN — IPRATROPIUM BROMIDE AND ALBUTEROL SULFATE 3 ML: 2.5; .5 SOLUTION RESPIRATORY (INHALATION) at 20:07

## 2019-01-01 RX ADMIN — IPRATROPIUM BROMIDE AND ALBUTEROL SULFATE 3 ML: 2.5; .5 SOLUTION RESPIRATORY (INHALATION) at 20:29

## 2019-01-01 RX ADMIN — MELATONIN 6 MG: at 00:24

## 2019-01-01 RX ADMIN — FUROSEMIDE 20 MG: 20 TABLET ORAL at 17:14

## 2019-01-01 RX ADMIN — IPRATROPIUM BROMIDE AND ALBUTEROL SULFATE 3 ML: 2.5; .5 SOLUTION RESPIRATORY (INHALATION) at 14:53

## 2019-01-01 RX ADMIN — MORPHINE SULFATE 1 MG: 2 INJECTION, SOLUTION INTRAMUSCULAR; INTRAVENOUS at 09:24

## 2019-01-01 RX ADMIN — IPRATROPIUM BROMIDE AND ALBUTEROL SULFATE 3 ML: 2.5; .5 SOLUTION RESPIRATORY (INHALATION) at 15:59

## 2019-01-01 RX ADMIN — THERA TABS 1 TABLET: TAB at 09:11

## 2019-01-01 RX ADMIN — MORPHINE SULFATE 1 MG: 2 INJECTION, SOLUTION INTRAMUSCULAR; INTRAVENOUS at 01:24

## 2019-01-01 RX ADMIN — DEXTROSE MONOHYDRATE 50 ML/HR: 50 INJECTION, SOLUTION INTRAVENOUS at 14:58

## 2019-01-01 RX ADMIN — BACITRACIN: 500 OINTMENT TOPICAL at 09:05

## 2019-01-01 RX ADMIN — LEVOTHYROXINE SODIUM 50 MCG: 50 TABLET ORAL at 06:16

## 2019-01-01 RX ADMIN — FLECAINIDE ACETATE 50 MG: 50 TABLET ORAL at 08:47

## 2019-01-01 RX ADMIN — DEXTROSE MONOHYDRATE 50 ML/HR: 50 INJECTION, SOLUTION INTRAVENOUS at 06:04

## 2019-01-01 RX ADMIN — SODIUM CHLORIDE, PRESERVATIVE FREE 3 ML: 5 INJECTION INTRAVENOUS at 09:13

## 2019-01-01 RX ADMIN — DILTIAZEM HYDROCHLORIDE 240 MG: 240 CAPSULE, COATED, EXTENDED RELEASE ORAL at 20:57

## 2019-01-01 RX ADMIN — CEFEPIME HYDROCHLORIDE 2 G: 2 INJECTION, POWDER, FOR SOLUTION INTRAVENOUS at 15:19

## 2019-01-01 RX ADMIN — IPRATROPIUM BROMIDE AND ALBUTEROL SULFATE 3 ML: 2.5; .5 SOLUTION RESPIRATORY (INHALATION) at 18:59

## 2019-01-01 RX ADMIN — MORPHINE SULFATE 1 MG: 2 INJECTION, SOLUTION INTRAMUSCULAR; INTRAVENOUS at 08:45

## 2019-01-01 RX ADMIN — MEROPENEM 1 G: 1 INJECTION, POWDER, FOR SOLUTION INTRAVENOUS at 14:46

## 2019-01-01 RX ADMIN — IPRATROPIUM BROMIDE AND ALBUTEROL SULFATE 3 ML: 2.5; .5 SOLUTION RESPIRATORY (INHALATION) at 18:51

## 2019-01-01 RX ADMIN — METHYLPREDNISOLONE SODIUM SUCCINATE 40 MG: 40 INJECTION, POWDER, FOR SOLUTION INTRAMUSCULAR; INTRAVENOUS at 05:43

## 2019-01-01 RX ADMIN — FUROSEMIDE 20 MG: 20 TABLET ORAL at 08:00

## 2019-01-01 RX ADMIN — IPRATROPIUM BROMIDE AND ALBUTEROL SULFATE 3 ML: 2.5; .5 SOLUTION RESPIRATORY (INHALATION) at 08:11

## 2019-01-01 RX ADMIN — FOLIC ACID 1 MG: 1 TABLET ORAL at 10:23

## 2019-01-01 RX ADMIN — ALBUTEROL SULFATE 2.5 MG: 2.5 SOLUTION RESPIRATORY (INHALATION) at 03:18

## 2019-01-01 RX ADMIN — FLECAINIDE ACETATE 50 MG: 50 TABLET ORAL at 20:50

## 2019-01-01 RX ADMIN — CEFTRIAXONE SODIUM 1 G: 1 INJECTION, POWDER, FOR SOLUTION INTRAMUSCULAR; INTRAVENOUS at 20:35

## 2019-01-01 RX ADMIN — IPRATROPIUM BROMIDE AND ALBUTEROL SULFATE 3 ML: 2.5; .5 SOLUTION RESPIRATORY (INHALATION) at 07:58

## 2019-01-01 RX ADMIN — HYDROCORTISONE: 1 CREAM TOPICAL at 21:04

## 2019-01-01 RX ADMIN — IPRATROPIUM BROMIDE AND ALBUTEROL SULFATE 3 ML: 2.5; .5 SOLUTION RESPIRATORY (INHALATION) at 11:17

## 2019-01-01 RX ADMIN — IPRATROPIUM BROMIDE AND ALBUTEROL SULFATE 3 ML: 2.5; .5 SOLUTION RESPIRATORY (INHALATION) at 16:33

## 2019-01-01 RX ADMIN — FUROSEMIDE 20 MG: 20 TABLET ORAL at 17:21

## 2019-01-01 RX ADMIN — HYDROCORTISONE: 1 CREAM TOPICAL at 09:00

## 2019-01-01 RX ADMIN — LEVOTHYROXINE SODIUM 50 MCG: 50 TABLET ORAL at 06:54

## 2019-01-01 RX ADMIN — FUROSEMIDE 40 MG: 40 TABLET ORAL at 08:51

## 2019-01-01 RX ADMIN — IPRATROPIUM BROMIDE AND ALBUTEROL SULFATE 3 ML: 2.5; .5 SOLUTION RESPIRATORY (INHALATION) at 07:36

## 2019-01-01 RX ADMIN — FERROUS SULFATE TAB EC 324 MG (65 MG FE EQUIVALENT) 324 MG: 324 (65 FE) TABLET DELAYED RESPONSE at 09:15

## 2019-01-01 RX ADMIN — TAMSULOSIN HYDROCHLORIDE 0.4 MG: 0.4 CAPSULE ORAL at 20:23

## 2019-01-01 RX ADMIN — FUROSEMIDE 40 MG: 40 TABLET ORAL at 09:04

## 2019-01-01 RX ADMIN — FLECAINIDE ACETATE 50 MG: 50 TABLET ORAL at 08:50

## 2019-01-01 RX ADMIN — FLECAINIDE ACETATE 50 MG: 50 TABLET ORAL at 10:22

## 2019-01-01 RX ADMIN — POTASSIUM CHLORIDE 40 MEQ: 10 CAPSULE, COATED, EXTENDED RELEASE ORAL at 20:23

## 2019-01-01 RX ADMIN — DEXTROSE MONOHYDRATE 75 ML/HR: 50 INJECTION, SOLUTION INTRAVENOUS at 05:43

## 2019-01-01 RX ADMIN — IPRATROPIUM BROMIDE AND ALBUTEROL SULFATE 3 ML: 2.5; .5 SOLUTION RESPIRATORY (INHALATION) at 08:18

## 2019-01-01 RX ADMIN — FENTANYL CITRATE 25 MCG: 50 INJECTION, SOLUTION INTRAMUSCULAR; INTRAVENOUS at 18:54

## 2019-01-01 RX ADMIN — FLECAINIDE ACETATE 50 MG: 50 TABLET ORAL at 10:15

## 2019-01-01 RX ADMIN — LEVOTHYROXINE SODIUM 50 MCG: 50 TABLET ORAL at 05:37

## 2019-01-01 RX ADMIN — PROPOFOL 20 MG: 10 INJECTION, EMULSION INTRAVENOUS at 19:46

## 2019-01-01 RX ADMIN — TAMSULOSIN HYDROCHLORIDE 0.4 MG: 0.4 CAPSULE ORAL at 20:29

## 2019-01-01 RX ADMIN — LEVOTHYROXINE SODIUM 50 MCG: 50 TABLET ORAL at 14:28

## 2019-01-01 RX ADMIN — ASPIRIN 81 MG: 81 TABLET, COATED ORAL at 10:23

## 2019-01-01 RX ADMIN — DILTIAZEM HYDROCHLORIDE 240 MG: 240 CAPSULE, COATED, EXTENDED RELEASE ORAL at 20:47

## 2019-01-01 RX ADMIN — HYDROCORTISONE: 1 CREAM TOPICAL at 20:55

## 2019-01-01 RX ADMIN — ATORVASTATIN CALCIUM 10 MG: 10 TABLET, FILM COATED ORAL at 20:00

## 2019-01-01 RX ADMIN — ATORVASTATIN CALCIUM 10 MG: 10 TABLET, FILM COATED ORAL at 21:21

## 2019-01-01 RX ADMIN — THERA TABS 1 TABLET: TAB at 08:17

## 2019-01-01 RX ADMIN — WARFARIN SODIUM 2.5 MG: 2.5 TABLET ORAL at 17:33

## 2019-01-01 RX ADMIN — EPOETIN ALFA 10000 UNITS: 10000 SOLUTION INTRAVENOUS; SUBCUTANEOUS at 13:31

## 2019-01-01 RX ADMIN — FUROSEMIDE 40 MG: 40 TABLET ORAL at 09:08

## 2019-01-01 RX ADMIN — HYDROCORTISONE: 1 CREAM TOPICAL at 10:24

## 2019-01-01 RX ADMIN — FLECAINIDE ACETATE 50 MG: 50 TABLET ORAL at 21:34

## 2019-01-01 RX ADMIN — CEFUROXIME AXETIL 250 MG: 250 TABLET ORAL at 22:28

## 2019-01-01 RX ADMIN — SODIUM CHLORIDE, PRESERVATIVE FREE 3 ML: 5 INJECTION INTRAVENOUS at 08:18

## 2019-01-01 RX ADMIN — FERROUS SULFATE TAB EC 324 MG (65 MG FE EQUIVALENT) 324 MG: 324 (65 FE) TABLET DELAYED RESPONSE at 17:53

## 2019-01-01 RX ADMIN — IPRATROPIUM BROMIDE AND ALBUTEROL SULFATE 3 ML: 2.5; .5 SOLUTION RESPIRATORY (INHALATION) at 14:57

## 2019-01-01 RX ADMIN — IPRATROPIUM BROMIDE AND ALBUTEROL SULFATE 3 ML: 2.5; .5 SOLUTION RESPIRATORY (INHALATION) at 10:40

## 2019-01-01 RX ADMIN — DEXTROSE MONOHYDRATE 75 ML/HR: 50 INJECTION, SOLUTION INTRAVENOUS at 03:11

## 2019-01-01 RX ADMIN — CYANOCOBALAMIN TAB 1000 MCG 1000 MCG: 1000 TAB at 09:35

## 2019-01-01 RX ADMIN — SODIUM CHLORIDE, PRESERVATIVE FREE 10 ML: 5 INJECTION INTRAVENOUS at 09:04

## 2019-01-01 RX ADMIN — FUROSEMIDE 20 MG: 20 TABLET ORAL at 09:35

## 2019-01-01 RX ADMIN — FOLIC ACID 1 MG: 1 TABLET ORAL at 09:36

## 2019-01-01 RX ADMIN — FOLIC ACID 1 MG: 1 TABLET ORAL at 10:22

## 2019-01-01 RX ADMIN — ATORVASTATIN CALCIUM 10 MG: 10 TABLET, FILM COATED ORAL at 21:05

## 2019-01-01 RX ADMIN — FLECAINIDE ACETATE 50 MG: 50 TABLET ORAL at 21:11

## 2019-01-01 RX ADMIN — SODIUM CHLORIDE 125 MG: 9 INJECTION, SOLUTION INTRAVENOUS at 09:24

## 2019-01-01 RX ADMIN — SODIUM CHLORIDE, PRESERVATIVE FREE 3 ML: 5 INJECTION INTRAVENOUS at 20:57

## 2019-01-01 RX ADMIN — ACETAMINOPHEN 650 MG: 325 TABLET, FILM COATED ORAL at 22:40

## 2019-01-01 RX ADMIN — ATORVASTATIN CALCIUM 10 MG: 10 TABLET, FILM COATED ORAL at 21:29

## 2019-01-01 RX ADMIN — IPRATROPIUM BROMIDE AND ALBUTEROL SULFATE 3 ML: 2.5; .5 SOLUTION RESPIRATORY (INHALATION) at 07:17

## 2019-01-01 RX ADMIN — IPRATROPIUM BROMIDE AND ALBUTEROL SULFATE 3 ML: 2.5; .5 SOLUTION RESPIRATORY (INHALATION) at 21:13

## 2019-01-01 RX ADMIN — FLECAINIDE ACETATE 50 MG: 50 TABLET ORAL at 08:00

## 2019-01-01 RX ADMIN — IPRATROPIUM BROMIDE AND ALBUTEROL SULFATE 3 ML: 2.5; .5 SOLUTION RESPIRATORY (INHALATION) at 07:21

## 2019-01-01 RX ADMIN — IPRATROPIUM BROMIDE AND ALBUTEROL SULFATE 3 ML: 2.5; .5 SOLUTION RESPIRATORY (INHALATION) at 19:38

## 2019-01-01 RX ADMIN — IPRATROPIUM BROMIDE AND ALBUTEROL SULFATE 3 ML: 2.5; .5 SOLUTION RESPIRATORY (INHALATION) at 14:49

## 2019-01-01 RX ADMIN — FOLIC ACID 1 MG: 1 TABLET ORAL at 10:24

## 2019-01-01 RX ADMIN — CEFEPIME HYDROCHLORIDE 2 G: 2 INJECTION, POWDER, FOR SOLUTION INTRAVENOUS at 03:11

## 2019-01-01 RX ADMIN — PROPOFOL 10 MG: 10 INJECTION, EMULSION INTRAVENOUS at 19:06

## 2019-01-01 RX ADMIN — BACITRACIN: 500 OINTMENT TOPICAL at 08:38

## 2019-01-01 RX ADMIN — CYANOCOBALAMIN TAB 1000 MCG 1000 MCG: 1000 TAB at 10:22

## 2019-01-01 RX ADMIN — FLECAINIDE ACETATE 50 MG: 50 TABLET ORAL at 10:24

## 2019-01-01 RX ADMIN — SODIUM CHLORIDE, POTASSIUM CHLORIDE, SODIUM LACTATE AND CALCIUM CHLORIDE 75 ML/HR: 600; 310; 30; 20 INJECTION, SOLUTION INTRAVENOUS at 14:01

## 2019-01-01 RX ADMIN — IPRATROPIUM BROMIDE AND ALBUTEROL SULFATE 3 ML: 2.5; .5 SOLUTION RESPIRATORY (INHALATION) at 15:00

## 2019-01-01 RX ADMIN — FUROSEMIDE 20 MG: 20 TABLET ORAL at 19:06

## 2019-01-01 RX ADMIN — IPRATROPIUM BROMIDE AND ALBUTEROL SULFATE 3 ML: 2.5; .5 SOLUTION RESPIRATORY (INHALATION) at 22:41

## 2019-01-01 RX ADMIN — CEFEPIME HYDROCHLORIDE 2 G: 2 INJECTION, POWDER, FOR SOLUTION INTRAVENOUS at 15:31

## 2019-01-01 RX ADMIN — FLECAINIDE ACETATE 50 MG: 50 TABLET ORAL at 09:36

## 2019-01-01 RX ADMIN — SODIUM CHLORIDE, PRESERVATIVE FREE 3 ML: 5 INJECTION INTRAVENOUS at 08:07

## 2019-01-01 RX ADMIN — CEFTRIAXONE SODIUM 1 G: 1 INJECTION, POWDER, FOR SOLUTION INTRAMUSCULAR; INTRAVENOUS at 17:14

## 2019-01-01 RX ADMIN — TAMSULOSIN HYDROCHLORIDE 0.4 MG: 0.4 CAPSULE ORAL at 21:53

## 2019-01-01 RX ADMIN — LEVOTHYROXINE SODIUM 50 MCG: 50 TABLET ORAL at 06:38

## 2019-01-01 RX ADMIN — CYANOCOBALAMIN TAB 1000 MCG 1000 MCG: 1000 TAB at 09:06

## 2019-01-01 RX ADMIN — IPRATROPIUM BROMIDE AND ALBUTEROL SULFATE 3 ML: 2.5; .5 SOLUTION RESPIRATORY (INHALATION) at 19:42

## 2019-01-01 RX ADMIN — SODIUM CHLORIDE, PRESERVATIVE FREE 3 ML: 5 INJECTION INTRAVENOUS at 20:30

## 2019-01-01 RX ADMIN — IPRATROPIUM BROMIDE AND ALBUTEROL SULFATE 3 ML: 2.5; .5 SOLUTION RESPIRATORY (INHALATION) at 11:03

## 2019-01-01 RX ADMIN — FLECAINIDE ACETATE 50 MG: 50 TABLET ORAL at 20:02

## 2019-01-01 RX ADMIN — ACETAMINOPHEN 650 MG: 325 TABLET, FILM COATED ORAL at 20:00

## 2019-01-01 RX ADMIN — PROPOFOL 30 MG: 10 INJECTION, EMULSION INTRAVENOUS at 19:51

## 2019-01-01 RX ADMIN — DEXTROSE AND SODIUM CHLORIDE 75 ML/HR: 5; 450 INJECTION, SOLUTION INTRAVENOUS at 06:18

## 2019-01-01 RX ADMIN — IPRATROPIUM BROMIDE AND ALBUTEROL SULFATE 3 ML: 2.5; .5 SOLUTION RESPIRATORY (INHALATION) at 12:39

## 2019-01-01 RX ADMIN — FLECAINIDE ACETATE 50 MG: 50 TABLET ORAL at 09:14

## 2019-01-01 RX ADMIN — CEFTRIAXONE SODIUM 1 G: 1 INJECTION, POWDER, FOR SOLUTION INTRAMUSCULAR; INTRAVENOUS at 18:02

## 2019-01-01 RX ADMIN — FLECAINIDE ACETATE 50 MG: 50 TABLET ORAL at 08:15

## 2019-01-01 RX ADMIN — SODIUM CHLORIDE, PRESERVATIVE FREE 3 ML: 5 INJECTION INTRAVENOUS at 20:34

## 2019-01-01 RX ADMIN — METHYLPREDNISOLONE SODIUM SUCCINATE 20 MG: 40 INJECTION, POWDER, FOR SOLUTION INTRAMUSCULAR; INTRAVENOUS at 15:33

## 2019-01-01 RX ADMIN — ATORVASTATIN CALCIUM 10 MG: 10 TABLET, FILM COATED ORAL at 21:30

## 2019-01-01 RX ADMIN — IPRATROPIUM BROMIDE AND ALBUTEROL SULFATE 3 ML: 2.5; .5 SOLUTION RESPIRATORY (INHALATION) at 20:10

## 2019-01-01 RX ADMIN — FERROUS SULFATE TAB EC 324 MG (65 MG FE EQUIVALENT) 324 MG: 324 (65 FE) TABLET DELAYED RESPONSE at 17:21

## 2019-01-01 RX ADMIN — DILTIAZEM HYDROCHLORIDE 240 MG: 240 CAPSULE, COATED, EXTENDED RELEASE ORAL at 22:28

## 2019-01-01 RX ADMIN — SODIUM CHLORIDE, PRESERVATIVE FREE 3 ML: 5 INJECTION INTRAVENOUS at 21:22

## 2019-01-01 RX ADMIN — WARFARIN SODIUM 1 MG: 1 TABLET ORAL at 20:42

## 2019-01-01 RX ADMIN — FOLIC ACID 1 MG: 1 TABLET ORAL at 08:00

## 2019-01-01 RX ADMIN — HYDROCORTISONE: 1 CREAM TOPICAL at 09:33

## 2019-01-01 RX ADMIN — BACITRACIN: 500 OINTMENT TOPICAL at 10:33

## 2019-01-01 RX ADMIN — FERROUS SULFATE TAB EC 324 MG (65 MG FE EQUIVALENT) 324 MG: 324 (65 FE) TABLET DELAYED RESPONSE at 22:01

## 2019-01-01 RX ADMIN — TAMSULOSIN HYDROCHLORIDE 0.4 MG: 0.4 CAPSULE ORAL at 21:06

## 2019-01-01 RX ADMIN — IPRATROPIUM BROMIDE AND ALBUTEROL SULFATE 3 ML: 2.5; .5 SOLUTION RESPIRATORY (INHALATION) at 10:41

## 2019-01-01 RX ADMIN — PROPOFOL 30 MG: 10 INJECTION, EMULSION INTRAVENOUS at 18:54

## 2019-01-01 RX ADMIN — LORAZEPAM 0.75 MG: 0.5 TABLET ORAL at 21:52

## 2019-01-01 RX ADMIN — POLYETHYLENE GLYCOL 3350 17 G: 17 POWDER, FOR SOLUTION ORAL at 20:49

## 2019-01-01 RX ADMIN — SODIUM CHLORIDE, PRESERVATIVE FREE 3 ML: 5 INJECTION INTRAVENOUS at 21:33

## 2019-01-01 RX ADMIN — THERA TABS 1 TABLET: TAB at 19:06

## 2019-01-01 RX ADMIN — FLECAINIDE ACETATE 50 MG: 50 TABLET ORAL at 22:01

## 2019-01-01 RX ADMIN — DILTIAZEM HYDROCHLORIDE 10 MG: 5 INJECTION INTRAVENOUS at 12:57

## 2019-01-01 RX ADMIN — METOPROLOL TARTRATE 5 MG: 5 INJECTION INTRAVENOUS at 20:16

## 2019-01-08 NOTE — PROGRESS NOTES
Pt called to say he started keflex yesterday for 7 days for UTI.  Pt will complete AB on Monday of next wk.  CC appt made for this Friday the 11th.. Left coumadin dose the same for now.

## 2019-01-11 NOTE — PROGRESS NOTES
PT continues Keflex until Monday. Denies any other med changes or bleeding issues. Denies any s/s of blood clot. PT ate broccoli last night. Instructed to continue same dose but increase vit k on Saturday and Monday. PT will keep appt for next Friday since he is seeing Dr Lopez that day as well. Patient instructed regarding medication; results given and questions answered. Nutritional counseling given.  Dietary factors affecting therapy addressed.  Patient instructed to monitor for excessive bruising or bleeding. PT verbalizes understanding.         This document has been electronically signed by Macey Corrales, SOREN @ on January 11, 2019 7:53 AM

## 2019-01-18 NOTE — PROGRESS NOTES
PT here today seeing Dr Lopez. PT finished AB on Monday. Denies any med changes or bleeding issues. Denies any s/s of blood clot. PT instructed to continue same dose and Coumadin friendly diet. PT will be seen in 1 month. Patient instructed regarding medication; results given and questions answered. Nutritional counseling given.  Dietary factors affecting therapy addressed.  Patient instructed to monitor for excessive bruising or bleeding. PT verbalizes understanding.         This document has been electronically signed by Macey Corrales, SOREN @ on January 18, 2019 8:10 AM

## 2019-01-18 NOTE — PROGRESS NOTES
Seth Montiel  83 y.o. male    01/18/2019  1. Nonrheumatic aortic valve disorder, unspecified    2. Atrial fibrillation, unspecified type (CMS/HCC)    3. Aortic valve disorder    4. Long-term (current) use of anticoagulants    5. SOB (shortness of breath)        History of Present Illness  Mr. Montiel is an 83-year-old male who is here for follow-up of his above stated problems.  He has long-standing dyspnea on exertion.  He is status post aortic valve replacement with bioprosthetic valve.  He has long-standing COPD on home oxygen.  Echocardiogram in February 2018 showed  · Left ventricular wall thickness is consistent with mild concentric hypertrophy.  · Left ventricular systolic function is normal. Estimated EF = 58%.  · Left ventricular diastolic dysfunction (grade I a) consistent with impaired relaxation.  · Right ventricular cavity is borderline dilated.  · Left atrial cavity size is moderate-to-severely dilated.  · Mild mitral valve regurgitation is present  · Mild tricuspid valve regurgitation is present.  · Mild pulmonic valve regurgitation is present.  · There is a bioprosthetic valve present. The aortic valve peak and mean gradients are elevated. Peak gradient 67 and mean gradient 34 mm hg.. 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 Moderate transvalvular regurgitation is present.     He has been compliant with his medication but continues to have significant dyspnea.  I understand that the patient does not wish to go on of anesthesia and has a DNR status.  TAVR has been discussed with him in the past but he has been reluctant to consider this.  He continues on anticoagulation with warfarin and his PT and INR are in the therapeutic range.        SUBJECTIVE    No Known Allergies      Past Medical History:   Diagnosis Date   • Aortic valve disorder    • Atrial fibrillation (CMS/HCC)    • COPD (chronic obstructive pulmonary disease) (CMS/HCC)    • Hyperlipidemia    •  Hypertension    • Nonrheumatic aortic valve disorder, unspecified    • SOB (shortness of breath)          Past Surgical History:   Procedure Laterality Date   • AORTIC VALVE SURGERY     • APPENDECTOMY     • CARDIAC SURGERY     • GALLBLADDER SURGERY     • ROTATOR CUFF REPAIR           Family History   Problem Relation Age of Onset   • No Known Problems Mother    • No Known Problems Father          Social History     Socioeconomic History   • Marital status:      Spouse name: Not on file   • Number of children: Not on file   • Years of education: Not on file   • Highest education level: Not on file   Social Needs   • Financial resource strain: Not on file   • Food insecurity - worry: Not on file   • Food insecurity - inability: Not on file   • Transportation needs - medical: Not on file   • Transportation needs - non-medical: Not on file   Occupational History   • Not on file   Tobacco Use   • Smoking status: Former Smoker   • Smokeless tobacco: Never Used   Substance and Sexual Activity   • Alcohol use: Yes     Comment: social   • Drug use: No   • Sexual activity: Defer   Other Topics Concern   • Not on file   Social History Narrative   • Not on file         Current Outpatient Medications   Medication Sig Dispense Refill   • aspirin 81 MG EC tablet Take 81 mg by mouth daily.     • atorvastatin (LIPITOR) 10 MG tablet Take 10 mg by mouth Every Night.     • Cholecalciferol (VITAMIN D3) 5000 UNITS capsule capsule Take 5,000 Units by mouth daily.     • diltiazem CD (CARDIZEM CD) 240 MG 24 hr capsule Take 240 mg by mouth daily.     • ferrous sulfate 325 (65 FE) MG tablet Take 325 mg by mouth 2 (two) times a day.     • flecainide (TAMBOCOR) 50 MG tablet TAKE ONE TABLET BY MOUTH EVERY 12 HOURS 60 tablet 7   • furosemide (LASIX) 40 MG tablet Take 40 mg by mouth 2 (two) times a day.     • levothyroxine (SYNTHROID, LEVOTHROID) 50 MCG tablet Take 50 mcg by mouth daily.     • Specialty Vitamins Products ([facility] EYE HEALTH  "FORMULA) capsule Take 1 capsule by mouth Daily.     • sulfamethoxazole-trimethoprim (BACTRIM DS,SEPTRA DS) 800-160 MG per tablet Take 1 tablet by mouth 2 (Two) Times a Day.     • warfarin (COUMADIN) 4 MG tablet Take 4 mg by mouth daily. As directed per [facility]       No current facility-administered medications for this visit.          OBJECTIVE    /80   Pulse 68   Ht 175.3 cm (69.02\")   Wt 78 kg (172 lb)   SpO2 95%   BMI 25.39 kg/m²         Review of Systems     Constitutional:  Denies recent weight loss, weight gain, fever or chills     HENT:  Denies any hearing loss, epistaxis, hoarseness, or difficulty speaking.     Eyes: Wears eyeglasses or contact lenses     Respiratory:  Dyspnea with exertion, long-standing COPD on home oxygen    Cardiovascular: Negative for palpations, chest pain    Gastrointestinal:  Denies change in bowel habits, dyspepsia, ulcer disease, hematochezia, or melena.     Endocrine: Negative for cold intolerance, heat intolerance, polydipsia, polyphagia and polyuria.     Genitourinary: Negative.      Musculoskeletal: DJD      Physical Exam     Constitutional: Cooperative, alert and oriented,  in no acute distress.     HENT:   Head: Normocephalic, normal hair patterns, no masses or tenderness.  Ears, Nose, and Throat: No gross abnormalities. No pallor or cyanosis.   Eyes: EOMS intact, PERRL, conjunctivae and lids unremarkable. Fundoscopic exam and visual fields not performed.   Neck: No palpable masses or adenopathy, no thyromegaly, no JVD, carotid pulses are full and equal bilaterally and without  Bruits.     Cardiovascular: Regular rhythm, S1 and S2 normal, no S3 or S4. 3/6 systolic murmur, prominent early diastolic murmur.  No gallops, or rubs detected.     Pulmonary/Chest: Chest: Increased AP diameter of the chest, normal respiratory excursion, no intercostal retraction, no use of accessory muscles.            Pulmonary: Normal breath sounds. No rales or " rodrigo.    Abdominal: Abdomen soft, bowel sounds normoactive, no masses, no hepatosplenomegaly, non-tender, no bruits.     Musculoskeletal: No deformities, clubbing, cyanosis, erythema, or edema observed.     Neurological: No gross motor or sensory deficits noted, affect appropriate, oriented to time, person, place.     Skin: Warm and dry to the touch, no apparent skin lesions or masses noted.     Psychiatric: He has a normal mood and affect. His behavior is normal. Judgment and thought content normal.         Procedures      Lab Results   Component Value Date    WBC 6.6 07/19/2016    HGB 9.6 (L) 07/19/2016    HCT 29.7 (L) 07/19/2016    MCV 92.5 07/19/2016     07/19/2016     Lab Results   Component Value Date    GLUCOSE 76 07/19/2016    BUN 27 (H) 07/19/2016    CREATININE 1.3 07/19/2016    CO2 43 (H) 07/19/2016    CALCIUM 7.8 (L) 07/19/2016    ALBUMIN 2.7 (L) 07/17/2016    AST 44 07/17/2016    ALT 55 07/17/2016     No results found for: CHOL  No results found for: TRIG  No results found for: HDL  No components found for: LDLCALC  No results found for: LDL  No results found for: HDLLDLRATIO  No components found for: CHOLHDL  No results found for: HGBA1C  Lab Results   Component Value Date    TSH 3.75 04/22/2015           ASSESSMENT AND PLAN  Mr. 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  I did discuss different treatment options with him at this point, treatment options will be restricted in view of his pulmonary status.  He is DNR.   He indicated that he is not a candidate for anesthesia.   I did present the option of TAVR to him fully understanding the potential risks, with a possibility of some improvement in his dyspnea.  He wishes to think about it.  I have continued antiplatelet therapy with aspirin, anticoagulation with warfarin, lipid-lowering therapy with atorvastatin,  management of atrial fibrillation with flecainide and Cardizem CD.  Lasix has been continued.        Seth was seen today for follow-up.    Diagnoses and all orders for this visit:    Nonrheumatic aortic valve disorder, unspecified    Atrial fibrillation, unspecified type (CMS/HCC)    Aortic valve disorder    Long-term (current) use of anticoagulants    SOB (shortness of breath)        Patient's Body mass index is 25.39 kg/m². BMI is within normal parameters. No follow-up required..      Wu Mcarthur MD  1/18/2019  8:33 AM

## 2019-02-19 NOTE — PROGRESS NOTES
Today's INR is 3.3.   Pt states no changes to meds or diet.  No bleeding issues.  Adjusted coumadin dose and instructed pt to increase Vit K intake today with a cup serving of broccoli.  Recheck INR next wk.  Pt verbalizes.  Patient instructed regarding medication; results given and questions answered. Nutritional counseling given.  Dietary factors affecting therapy addressed.  Patient instructed to monitor for excessive bruising or bleeding.        This document has been electronically signed by SOREN Kent @ on February 19, 2019 7:51 AM

## 2019-02-24 PROBLEM — R31.9 HEMATURIA: Status: ACTIVE | Noted: 2019-01-01

## 2019-02-28 PROBLEM — N13.30 HYDRONEPHROSIS: Status: ACTIVE | Noted: 2019-01-01

## 2019-03-26 PROBLEM — J18.9 PNEUMONIA OF LEFT LOWER LOBE DUE TO INFECTIOUS ORGANISM: Status: ACTIVE | Noted: 2019-01-01

## 2019-03-26 PROBLEM — E78.5 HYPERLIPIDEMIA: Chronic | Status: ACTIVE | Noted: 2019-01-01

## 2019-03-26 PROBLEM — I10 HYPERTENSION: Chronic | Status: ACTIVE | Noted: 2019-01-01

## 2019-04-01 NOTE — ANESTHESIA PREPROCEDURE EVALUATION
Anesthesia Evaluation     Patient summary reviewed and Nursing notes reviewed   no history of anesthetic complications (Patient advised to be NPO after 0800 4/2/19. Daughter understands and agrees.):  NPO Solid Status: > 8 hours  NPO Liquid Status: > 8 hours           Airway   Mallampati: II  TM distance: >3 FB  Neck ROM: full  No difficulty expected  Comment: Full beard.  Dental    (+) poor dentition    Pulmonary    (+) a smoker Former, COPD moderate, shortness of breath, decreased breath sounds,     ROS comment: COMPARISON: Chest July 16, 2016.     FINDINGS: Cardiac silhouette is normal in size. Pulmonary  vascularity is unremarkable.      Sternal sutures from prior cardiac surgery.  No focal infiltrate or consolidation.  No pleural effusion.  No  pneumothorax.  Subtle fibrotic changes left lung base.        IMPRESSION:  CONCLUSION: Midline sternal sutures from prior cardiac surgery.  Subtle fibrotic changes left lung base. Otherwise unremarkable  chest. No evidence of active disease.     Electronically signed by:  Chano Samuels MD  2/24/2019 4:29 PM CST  Cardiovascular   Exercise tolerance: poor (<4 METS)    NYHA Classification: IV  ECG reviewed  PT is on anticoagulation therapy  Patient on routine beta blocker  Rhythm: irregular  Rate: normal    (+) hypertension, valvular problems/murmurs (Aortic valve replaced;see Transthoracic echo.) AS and murmur, dysrhythmias Atrial Fib, GLASER, murmur (Grade II/VI systolic), hyperlipidemia,     ROS comment: Atrial fibrillation with a competing junctional pacemaker with premature  ventricular or aberrantly conducted complexes  Voltage criteria for left ventricular hypertrophy  Baseline artifact , poor tracing  Abnormal ECG    Confirmed by YVAN LOWE MD ([id number]) on 3/27/2019 9:34:38 AM· Left atrial cavity size is moderate-to-severely dilated.  · Severe aortic valve stenosis is present.  · Mild mitral valve regurgitation is present  · Mild tricuspid valve regurgitation is  present.  · Estimated EF = 65%.  · Left ventricular systolic function is normal.  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 and the gradients across the valve is high  · Consider BRIT    Neuro/Psych  (+) weakness, psychiatric history Anxiety,     GI/Hepatic/Renal/Endo    (+)  GERD well controlled,  renal disease (Creatinine 1.32) CRI and stones,     ROS Comment: HGB 7.5 HCT 25.1 4/1/19    Musculoskeletal     (+) arthralgias, back pain, chronic pain,   Abdominal    Substance History - negative use     OB/GYN negative ob/gyn ROS         Other   (+) arthritis                       Anesthesia Plan    ASA 4     MAC   (Patient now with hydronephrosis along with a history of moderate to severe aortic valve stenosis (porcine aortic valve seven years ago).  Will give a gentle sedation for the cysto and stent. However, if laser used, then LMA may be required to minimize movement. Daughter in addition to patient understands and agrees. Advised NPO after 0800.)  intravenous induction   Anesthetic plan, all risks, benefits, and alternatives have been provided, discussed and informed consent has been obtained with: patient, child and healthcare power of .

## 2019-04-01 NOTE — THERAPY TREATMENT NOTE
Acute Care - Physical Therapy Treatment Note  [facility]     Patient Name: Seth Montiel  : 1935  MRN: [id number]  Today's Date: 2019  Onset of Illness/Injury or Date of Surgery: 19  Date of Referral to PT: 19  Referring Physician: ERNA Curran MD    Admit Date: 3/26/2019    Visit Dx:    ICD-10-CM ICD-9-CM   1. Pneumonia of left lower lobe due to infectious organism (CMS/HCC) J18.1 486   2. Poisoning by warfarin sodium, accidental or unintentional, initial encounter T45.511A 964.2     E858.2   3. Impaired functional mobility, balance, gait, and endurance Z74.09 V49.89   4. Impaired mobility and activities of daily living Z74.09 799.89   5. Dysphagia, unspecified type R13.10 787.20     Patient Active Problem List   Diagnosis   • Atrial fibrillation [I48.91]   • Long-term (current) use of anticoagulants   • Nonrheumatic aortic valve disorder, unspecified   • Atrial fibrillation (CMS/HCC)   • COPD (chronic obstructive pulmonary disease) (CMS/HCC)   • SOB (shortness of breath)   • Aortic valve disorder   • Hematuria   • Hydronephrosis   • Pneumonia of left lower lobe due to infectious organism (CMS/HCC)   • Hypertension   • Hyperlipidemia       Therapy Treatment    Rehabilitation Treatment Summary     Row Name 19 1358 19 0805          Treatment Time/Intention    Discipline  physical therapy assistant  -CA  occupational therapy assistant  -BB     Document Type  therapy note (daily note)  -CA  therapy note (daily note)  -BB     Subjective Information  no complaints  -CA  complains of;weakness  -BB     Mode of Treatment  individual therapy;physical therapy  -CA  individual therapy;occupational therapy  -BB     Patient/Family Observations  no family present  -CA  no family present  -BB     Total Minutes, Occupational Therapy Treatment  --  40  -BB     Therapy Frequency (OT Eval)  --  other (see comments) 5-7 days/wk  -BB     Patient Effort  --  good  -BB     Existing  "Precautions/Restrictions  --  fall;oxygen therapy device and L/min  -BB     Recorded by [CA] Doug Ponce, PTA 04/01/19 1413 [BB] Munira Ferris COTA/L 04/01/19 1350     Row Name 04/01/19 1358 04/01/19 0805          Vital Signs    Pretreatment Heart Rate (beats/min)  --  70  -BB     Posttreatment Heart Rate (beats/min)  --  64  -BB     Pre SpO2 (%)  --  94  -BB     O2 Delivery Pre Treatment  --  supplemental O2  -BB     Post SpO2 (%)  --  90  -BB     O2 Delivery Post Treatment  --  supplemental O2  -BB     Pre Patient Position  Supine  -CA  Supine  -BB     Intra Patient Position  Supine  -CA  --     Post Patient Position  Supine  -CA  Supine  -BB     Recorded by [CA] Doug Ponce, PTA 04/01/19 1441 [BB] Munira Ferris COTA/L 04/01/19 1350     Row Name 04/01/19 1358 04/01/19 0805          Cognitive Assessment/Intervention- PT/OT    Orientation Status (Cognition)  oriented x 4  -CA  oriented x 4  -BB     Follows Commands (Cognition)  WFL  -CA  WFL  -BB     Safety Deficit (Cognitive)  mild deficit  -CA  --     Recorded by [CA] Doug Ponce, PTA 04/01/19 1441 [BB] Munira Ferris COTA/L 04/01/19 1350     Row Name 04/01/19 0805             Safety Issues, Functional Mobility    Comment, Safety Issues/Impairments (Mobility)  Discussed PLB, E/C and W/S with pt  -BB      Recorded by [BB] Munira Ferris COTA/L 04/01/19 1354      Row Name 04/01/19 1358 04/01/19 0810 04/01/19 0805       Bed Mobility Assessment/Treatment    Comment (Bed Mobility)  pt. deferred EOB  -CA   Pt encouraged to at least sit EOB, however defers.  -BB  Pt defers EOB/OOB this am,stating \"I am having an off day. Maybe later.\"  -BB    Recorded by [CA] Doug Ponce, PTA 04/01/19 1441 [BB] Munira Ferris SOLOMON/L 04/01/19 1350 [BB] Munira Ferris, SOLOMON/L 04/01/19 1350    Row Name 04/01/19 1358             Transfer Assessment/Treatment    Comment (Transfers)  pt. deferred  -CA      Recorded by [CA] Doug Ponce, PTA " 04/01/19 1441      Row Name 04/01/19 0810             ADL Assessment/Intervention    Additional Documentation  -- Pt defers ADL   -BB      Recorded by [BB] Munira Ferris COTA/L 04/01/19 1350      Row Name 04/01/19 1358             Therapeutic Exercise    Therapeutic Exercise  supine, lower extremities  -CA      Recorded by [CA] Doug Ponce, [facility] [place] 04/01/19 1441      Row Name 04/01/19 1358             Lower Extremity Supine Therapeutic Exercise    Performed, Supine Lower Extremity (Therapeutic Exercise)  hip flexion/extension;hip abduction/adduction;quadriceps sets;ankle pumps;heel slides;hip external/internal rotation  -CA      Exercise Type, Supine Lower Extremity (Therapeutic Exercise)  AROM (active range of motion)  -CA      Sets/Reps Detail, Supine Lower Extremity (Therapeutic Exercise)  15x1  -CA      Recorded by [CA] Doug Ponce, [facility] [place] 04/01/19 1441      Row Name 04/01/19 0810             Therapeutic Exercise    Upper Extremity Range of Motion (Therapeutic Exercise)  shoulder flexion/extension, bilateral;shoulder internal/external rotation, bilateral;elbow flexion/extension, bilateral;forearm supination/pronation, bilateral;wrist flexion/extension, bilateral  -BB      Hand (Therapeutic Exercise)  finger flexion/extension, bilateral  -BB      Weight/Resistance (Therapeutic Exercise)  1 pound  -BB      Exercise Type (Therapeutic Exercise)  AROM (active range of motion)  -BB      Sets/Reps (Therapeutic Exercise)  2x15 with RBs  -BB      Equipment (Therapeutic Exercise)  free weight, barbell  -BB      Expected Outcome (Therapeutic Exercise)  improve functional stability;improve functional tolerance, self-care activity;improve performance, transfer skills  -BB      Recorded by [BB] Munira Ferris COTA/L 04/01/19 1350      Row Name 04/01/19 1358 04/01/19 0810          Positioning and Restraints    Pre-Treatment Position  in bed  -CA  in bed  -BB     Post Treatment Position  bed  -CA  bed  -BB     In Bed   supine;call light within reach;encouraged to call for assist;exit alarm on  -CA  supine;call light within reach;encouraged to call for assist;exit alarm on  -BB     Recorded by [CA] Doug Ponce, PTA 04/01/19 1441 [BB] Munira Ferris COTA/L 04/01/19 1350     Row Name 04/01/19 1358 04/01/19 0810          Pain Scale: Numbers Pre/Post-Treatment    Pain Scale: Numbers, Pretreatment  0/10 - no pain  -CA  0/10 - no pain  -BB     Pain Scale: Numbers, Post-Treatment  0/10 - no pain  -CA  0/10 - no pain  -BB     Recorded by [CA] Doug Ponce, PTA 04/01/19 1441 [BB] Munira Ferris COTA/L 04/01/19 1350     Row Name                Wound 03/26/19 2220 coccyx pressure injury    Wound - Properties Group Date first assessed: 03/26/19 [CN] Time first assessed: 2220 [CN] Present On Admission : yes;picture taken [CN] Location: coccyx [CN] Type: pressure injury [CN] Stage, Pressure Injury: Stage 2 [CN] Recorded by:  [CN] Roland Simpson RN 03/26/19 2241    Row Name                Wound 03/26/19 2220 Left lower gluteal pressure injury    Wound - Properties Group Date first assessed: 03/26/19 [CN] Time first assessed: 2220 [CN] Side: Left [CN] Orientation: lower [CN] Location: gluteal [CN] Type: pressure injury [CN] Stage, Pressure Injury: Stage 2 [CN] Recorded by:  [CN] Roland Simpson RN 03/26/19 2242    Row Name                Wound 03/27/19 1420 Left lower arm skin tear    Wound - Properties Group Date first assessed: 03/27/19 [BL] Time first assessed: 1420 [BL] Present On Admission : yes [BL] Side: Left [BL] Orientation: lower [BL] Location: arm [BL] Type: skin tear [BL] Recorded by:  [BL] Chelsea Chaves RN 03/27/19 1503    Row Name 04/01/19 0810             Plan of Care Review    Plan of Care Reviewed With  patient  -BB      Recorded by [BB] Munira Ferris COTA/L 04/01/19 1350      Row Name 04/01/19 0810             Outcome Summary/Treatment Plan (OT)    Daily Summary of Progress (OT)  progress toward  functional goals is gradual  -BB      Plan for Continued Treatment (OT)  continue POC  -BB      Anticipated Discharge Disposition (OT)  skilled nursing facility  -BB      Recorded by [BB] Munira Ferris COTA/L 04/01/19 1350        User Key  (r) = Recorded By, (t) = Taken By, (c) = Cosigned By    Initials Name Effective Dates Discipline    CA Doug Ponce, PTA 03/07/18 -  PT    BB Munira Ferris COTA/L 03/07/18 -  OT    BL Chelsea Chaves RN 07/26/16 -  Nurse    CN Roland Simpson RN 10/17/16 -  Nurse          Wound 03/26/19 2220 coccyx pressure injury (Active)   Dressing Appearance open to air 4/1/2019  8:10 AM   Closure Open to air 4/1/2019  8:10 AM   Base red/granulating 4/1/2019  8:10 AM   Periwound redness 4/1/2019  8:10 AM   Drainage Amount none 4/1/2019  8:10 AM   Care, Wound barrier applied 4/1/2019  8:10 AM   Periwound Care, Wound barrier ointment applied 3/31/2019  7:42 PM       Wound 03/26/19 2220 Left lower gluteal pressure injury (Active)   Closure Open to air 4/1/2019  8:10 AM   Base red/granulating 4/1/2019  8:10 AM   Periwound blanchable;redness 4/1/2019  8:10 AM   Periwound Temperature warm 3/31/2019  7:42 PM   Periwound Skin Turgor soft 3/31/2019  7:42 PM   Drainage Amount none 4/1/2019  8:10 AM   Care, Wound barrier applied 4/1/2019  8:10 AM   Periwound Care, Wound barrier ointment applied 3/31/2019  7:42 PM       Wound 03/27/19 1420 Left lower arm skin tear (Active)   Dressing Appearance dry 4/1/2019  8:10 AM   Closure Open to air 4/1/2019  8:10 AM   Base pink;scab 4/1/2019  8:10 AM   Periwound dry 4/1/2019  8:10 AM   Periwound Temperature warm 4/1/2019  8:10 AM   Drainage Amount none 4/1/2019  8:10 AM   Care, Wound antimicrobial agent applied 4/1/2019  8:10 AM       Rehab Goal Summary     Row Name 04/01/19 1358 04/01/19 0805          Physical Therapy Goals    Bed Mobility Goal Selection (PT)  bed mobility, PT goal 1  -CA  --     Transfer Goal Selection (PT)  transfer, PT goal 1   -CA  --     Gait Training Goal Selection (PT)  gait training, PT goal 1  -CA  --     Strength Goal Selection (PT)  strength, PT goal 1  -CA  --        Bed Mobility Goal 1 (PT)    Activity/Assistive Device (Bed Mobility Goal 1, PT)  bed mobility activities, all  -CA  --     [place] Level/Cues Needed (Bed Mobility Goal 1, PT)  standby assist  -CA  --     Time Frame (Bed Mobility Goal 1, PT)  4 days  -CA  --     Barriers (Bed Mobility Goal 1, PT)  SOB, cognition  -CA  --     Progress/Outcomes (Bed Mobility Goal 1, PT)  goal not met  -CA  --        Transfer Goal 1 (PT)    Activity/Assistive Device (Transfer Goal 1, PT)  sit-to-stand/stand-to-sit;bed-to-chair/chair-to-bed;walker, rolling  -CA  --     [place] Level/Cues Needed (Transfer Goal 1, PT)  standby assist  -CA  --     Time Frame (Transfer Goal 1, PT)  4 days  -CA  --     Barriers (Transfers Goal 1, PT)  SOB, cognition  -CA  --     Progress/Outcome (Transfer Goal 1, PT)  goal not met  -CA  --        Gait Training Goal 1 (PT)    Activity/Assistive Device (Gait Training Goal 1, PT)  walker, rolling;assistive device use;increase endurance/gait distance;increase energy conservation;decrease fall risk  -CA  --     [place] Level (Gait Training Goal 1, PT)  contact guard assist;verbal cues required  -CA  --     Distance (Gait Goal 1, PT)  40 feet  -CA  --     Time Frame (Gait Training Goal 1, PT)  5 days  -CA  --     Barriers (Gait Training Goal 1, PT)  SOB, cognition  -CA  --     Progress/Outcome (Gait Training Goal 1, PT)  goal not met  -CA  --        Strength Goal 1 (PT)    Strength Goal 1 (PT)  Patient will be able to complete 20 reps of at least 5 seated LE exercises in order to demo improved strength for transfers and ambulation  -CA  --     Time Frame (Strength Goal 1, PT)  4 days  -CA  --     Barriers (Strength Goal 1, PT)  SOB, cognition  -CA  --     Progress/Outcome (Strength Goal 1, PT)  goal not met  -CA  --        Occupational Therapy Goals     Bed Mobility Goal Selection (OT)  --  bed mobility, OT goal 1  -BB     Transfer Goal Selection (OT)  --  transfer, OT goal 1  -BB     Bathing Goal Selection (OT)  --  bathing, OT goal 1  -BB     Dressing Goal Selection (OT)  --  dressing, OT goal 1  -BB        Bed Mobility Goal 1 (OT)    Activity/Assistive Device (Bed Mobility Goal 1, OT)  --  bed mobility activities, all  -BB     [place] Level/Cues Needed (Bed Mobility Goal 1, OT)  --  contact guard assist  -BB     Time Frame (Bed Mobility Goal 1, OT)  --  long term goal (LTG)  -BB     Progress/Outcomes (Bed Mobility Goal 1, OT)  --  goal not met  -BB        Transfer Goal 1 (OT)    Activity/Assistive Device (Transfer Goal 1, OT)  --  transfers, all  -BB     [place] Level/Cues Needed (Transfer Goal 1, OT)  --  supervision required  -BB     Time Frame (Transfer Goal 1, OT)  --  long term goal (LTG)  -BB     Progress/Outcome (Transfer Goal 1, OT)  --  goal not met  -BB        Bathing Goal 1 (OT)    Activity/Assistive Device (Bathing Goal 1, OT)  --  bathing skills, all  -BB     [place] Level/Cues Needed (Bathing Goal 1, OT)  --  minimum assist (75% or more patient effort)  -BB     Time Frame (Bathing Goal 1, OT)  --  long term goal (LTG)  -BB     Progress/Outcomes (Bathing Goal 1, OT)  --  goal not met  -BB        Dressing Goal 1 (OT)    Activity/Assistive Device (Dressing Goal 1, OT)  --  dressing skills, all  -BB     [place]/Cues Needed (Dressing Goal 1, OT)  --  minimum assist (75% or more patient effort)  -BB     Time Frame (Dressing Goal 1, OT)  --  long term goal (LTG)  -BB     Progress/Outcome (Dressing Goal 1, OT)  --  goal not met  -BB        Patient Education Goal (OT)    Activity (Patient Education Goal, OT)  --  B UE HEP for increased independence with functional mobility and ADLs, EC/WS and home safety/fall prev.  -BB     [place]/Cues/Accuracy (Memory Goal 2, OT)  --  demonstrates adequately;verbalizes understanding  -BB     Time  Frame (Patient Education Goal, OT)  --  long term goal (LTG)  -BB     Progress/Outcome (Patient Education Goal, OT)  --  goal not met  -BB       User Key  (r) = Recorded By, (t) = Taken By, (c) = Cosigned By    Initials Name Provider Type Discipline    CA Doug Ponce, PTA Physical Therapy Assistant PT    BB Munira Ferris, SOLOMON/L Occupational Therapy Assistant OT          Physical Therapy Education     Title: PT OT SLP Therapies (In Progress)     Topic: Physical Therapy (In Progress)     Point: Mobility training (In Progress)     Learning Progress Summary           Patient Acceptance, E, NR by  at 3/27/2019  3:35 PM    Comment:  Role of PT, PT POC, transfer technique                   Point: Precautions (In Progress)     Learning Progress Summary           Patient Acceptance, E, NR by  at 3/27/2019  3:36 PM    Comment:  gait belt, call light, staff assistance with mobility                               User Key     Initials Effective Dates Name Provider Type Discipline     07/23/18 -  Debbie Galeana, PT Physical Therapist PT                PT Recommendation and Plan     Plan of Care Reviewed With: patient  Progress: no change  Outcome Summary: pt. needs max encouragement to work with therapy.  pt. deferred EOB/OOB and did complete supine therex  Outcome Measures     Row Name 04/01/19 1358 04/01/19 0805 03/31/19 1030       How much help from another person do you currently need...    Turning from your back to your side while in flat bed without using bedrails?  3  -CA  --  --    Moving from lying on back to sitting on the side of a flat bed without bedrails?  3  -CA  --  --    Moving to and from a bed to a chair (including a wheelchair)?  3  -CA  --  --    Standing up from a chair using your arms (e.g., wheelchair, bedside chair)?  3  -CA  --  --    Climbing 3-5 steps with a railing?  2  -CA  --  --    To walk in hospital room?  2  -CA  --  --    AM-PAC 6 Clicks Score  16  -CA  --  --       How much help  from another is currently needed...    Putting on and taking off regular lower body clothing?  --  2  -BB  2  -BL    Bathing (including washing, rinsing, and drying)  --  2  -BB  2  -BL    Toileting (which includes using toilet bed pan or urinal)  --  2  -BB  2  -BL    Putting on and taking off regular upper body clothing  --  3  -BB  3  -BL    Taking care of personal grooming (such as brushing teeth)  --  3  -BB  3  -BL    Eating meals  --  3  -BB  3  -BL    Score  --  15  -BB  15  -BL       Functional Assessment    Outcome Measure Options  AM-PAC 6 Clicks Basic Mobility (PT)  -CA  --  AM-PAC 6 Clicks Daily Activity (OT)  -BL    Row Name 03/31/19 1000 03/30/19 1500 03/30/19 1420       How much help from another person do you currently need...    Turning from your back to your side while in flat bed without using bedrails?  3  -RW  3  -RW  --    Moving from lying on back to sitting on the side of a flat bed without bedrails?  3  -RW  3  -RW  --    Moving to and from a bed to a chair (including a wheelchair)?  3  -RW  2  -RW  --    Standing up from a chair using your arms (e.g., wheelchair, bedside chair)?  3  -RW  2  -RW  --    Climbing 3-5 steps with a railing?  2  -RW  2  -RW  --    To walk in hospital room?  2  -RW  2  -RW  --    AM-PAC 6 Clicks Score  16  -RW  14  -RW  --       How much help from another is currently needed...    Putting on and taking off regular lower body clothing?  --  --  2  -TO    Bathing (including washing, rinsing, and drying)  --  --  2  -TO    Toileting (which includes using toilet bed pan or urinal)  --  --  2  -TO    Putting on and taking off regular upper body clothing  --  --  3  -TO    Taking care of personal grooming (such as brushing teeth)  --  --  3  -TO    Eating meals  --  --  3  -TO    Score  --  --  15  -TO    Row Name 03/29/19 1600             How much help from another person do you currently need...    Turning from your back to your side while in flat bed without using  bedrails?  3  -JW      Moving from lying on back to sitting on the side of a flat bed without bedrails?  3  -JW      Moving to and from a bed to a chair (including a wheelchair)?  2  -JW      Standing up from a chair using your arms (e.g., wheelchair, bedside chair)?  3  -JW      Climbing 3-5 steps with a railing?  1  -JW      To walk in hospital room?  2  -JW      AM-PAC 6 Clicks Score  14  -JW         Functional Assessment    Outcome Measure Options  AM-PAC 6 Clicks Basic Mobility (PT)  -JW        User Key  (r) = Recorded By, (t) = Taken By, (c) = Cosigned By    Initials Name Provider Type    JW Todd Chen, PTA Physical Therapy Assistant    Doug Pate, MAK Physical Therapy Assistant    RW Dustin Sanchez, PTA Physical Therapy Assistant    BB Munira Ferris, SOLOMON/L Occupational Therapy Assistant    BL Keri Camargo, SOLOMON/L Occupational Therapy Assistant    TO Alexis Tobias, SOLOMON/L Occupational Therapy Assistant         Time Calculation:   PT Charges     Row Name 04/01/19 1444             Time Calculation    Start Time  1358  -CA      Stop Time  1422  -CA      Time Calculation (min)  24 min  -CA      PT Received On  04/01/19  -CA         Time Calculation- PT    Total Timed Code Minutes- PT  24 minute(s)  -CA        User Key  (r) = Recorded By, (t) = Taken By, (c) = Cosigned By    Initials Name Provider Type    Doug Pate, MAK Physical Therapy Assistant        Therapy Charges for Today     Code Description Service Date Service Provider Modifiers Qty    [id number] HC PT THER PROC EA 15 MIN 4/1/2019 Doug Ponce PTA GP 2          PT G-Codes  Outcome Measure Options: AM-PAC 6 Clicks Basic Mobility (PT)  AM-PAC 6 Clicks Score: 16  Score: 15    Doug oPnce PTA  4/1/2019

## 2019-04-01 NOTE — THERAPY TREATMENT NOTE
Acute Care - Occupational Therapy Treatment Note  [facility]     Patient Name: Seth Montiel  : 1935  MRN: [id number]  Today's Date: 2019  Onset of Illness/Injury or Date of Surgery: 19  Date of Referral to OT: 19  Referring Physician: ERNA Curran MD    Admit Date: 3/26/2019       ICD-10-CM ICD-9-CM   1. Pneumonia of left lower lobe due to infectious organism (CMS/HCC) J18.1 486   2. Poisoning by warfarin sodium, accidental or unintentional, initial encounter T45.511A 964.2     E858.2   3. Impaired functional mobility, balance, gait, and endurance Z74.09 V49.89   4. Impaired mobility and activities of daily living Z74.09 799.89   5. Dysphagia, unspecified type R13.10 787.20     Patient Active Problem List   Diagnosis   • Atrial fibrillation [I48.91]   • Long-term (current) use of anticoagulants   • Nonrheumatic aortic valve disorder, unspecified   • Atrial fibrillation (CMS/HCC)   • COPD (chronic obstructive pulmonary disease) (CMS/HCC)   • SOB (shortness of breath)   • Aortic valve disorder   • Hematuria   • Hydronephrosis   • Pneumonia of left lower lobe due to infectious organism (CMS/HCC)   • Hypertension   • Hyperlipidemia     Past Medical History:   Diagnosis Date   • Aortic valve disorder    • Atrial fibrillation (CMS/HCC)    • COPD (chronic obstructive pulmonary disease) (CMS/HCC)    • Hyperlipidemia    • Hypertension    • Nonrheumatic aortic valve disorder, unspecified    • SOB (shortness of breath)      Past Surgical History:   Procedure Laterality Date   • AORTIC VALVE SURGERY     • APPENDECTOMY     • CARDIAC SURGERY     • CYSTOSCOPY, URETEROSCOPY, RETROGRADE PYELOGRAM, STENT INSERTION Left 2019    Procedure: CYSTOSCOPY, LEFT RETROGRADE, URETEROSCOPY, LASER LITHOTRIPSY, STENT PLACEMENT;  Surgeon: [place], Holland HOLBROOK MD;  Location: [facility];  Service: Urology   • GALLBLADDER SURGERY     • ROTATOR CUFF REPAIR         Therapy Treatment    Rehabilitation Treatment  "Summary     Row Name 04/01/19 0805             Treatment Time/Intention    Discipline  occupational therapy assistant  -BB      Document Type  therapy note (daily note)  -BB      Subjective Information  complains of;weakness  -BB      Mode of Treatment  individual therapy;occupational therapy  -BB      Patient/Family Observations  no family present  -BB      Total Minutes, Occupational Therapy Treatment  40  -BB      Therapy Frequency (OT Eval)  other (see comments) 5-7 days/wk  -BB      Patient Effort  good  -BB      Existing Precautions/Restrictions  fall;oxygen therapy device and L/min  -BB      Recorded by [BB] Munira Ferris COTA/L 04/01/19 1350      Row Name 04/01/19 0805             Vital Signs    Pretreatment Heart Rate (beats/min)  70  -BB      Posttreatment Heart Rate (beats/min)  64  -BB      Pre SpO2 (%)  94  -BB      O2 Delivery Pre Treatment  supplemental O2  -BB      Post SpO2 (%)  90  -BB      O2 Delivery Post Treatment  supplemental O2  -BB      Pre Patient Position  Supine  -BB      Post Patient Position  Supine  -BB      Recorded by [BB] Munira Ferris COTA/L 04/01/19 1350      Row Name 04/01/19 0805             Cognitive Assessment/Intervention- PT/OT    Orientation Status (Cognition)  oriented x 4  -BB      Follows Commands (Cognition)  WFL  -BB      Recorded by [BB] Munira Ferris COTA/L 04/01/19 1350      Row Name 04/01/19 0805             Safety Issues, Functional Mobility    Comment, Safety Issues/Impairments (Mobility)  Discussed PLB, E/C and W/S with pt  -BB      Recorded by [BB] Munira Ferris COTA/L 04/01/19 1354      Row Name 04/01/19 0810 04/01/19 0805          Bed Mobility Assessment/Treatment    Comment (Bed Mobility)   Pt encouraged to at least sit EOB, however defers.  -BB  Pt defers EOB/OOB this am,stating \"I am having an off day. Maybe later.\"  -BB     Recorded by [BB] Munira Ferris COTA/RANDELL 04/01/19 1350 [BB] Munira Ferris COTA/L 04/01/19 1350 "     Row Name 04/01/19 0810             ADL Assessment/Intervention    Additional Documentation  -- Pt defers ADL   -BB      Recorded by [BB] Munira Ferris COTA/L 04/01/19 1350      Row Name 04/01/19 0810             Therapeutic Exercise    Upper Extremity Range of Motion (Therapeutic Exercise)  shoulder flexion/extension, bilateral;shoulder internal/external rotation, bilateral;elbow flexion/extension, bilateral;forearm supination/pronation, bilateral;wrist flexion/extension, bilateral  -BB      Hand (Therapeutic Exercise)  finger flexion/extension, bilateral  -BB      Weight/Resistance (Therapeutic Exercise)  1 pound  -BB      Exercise Type (Therapeutic Exercise)  AROM (active range of motion)  -BB      Sets/Reps (Therapeutic Exercise)  2x15 with RBs  -BB      Equipment (Therapeutic Exercise)  free weight, barbell  -BB      Expected Outcome (Therapeutic Exercise)  improve functional stability;improve functional tolerance, self-care activity;improve performance, transfer skills  -BB      Recorded by [BB] Munira Ferris COTA/L 04/01/19 1350      Row Name 04/01/19 0810             Positioning and Restraints    Pre-Treatment Position  in bed  -BB      Post Treatment Position  bed  -BB      In Bed  supine;call light within reach;encouraged to call for assist;exit alarm on  -BB      Recorded by [BB] Munira Ferris COTA/L 04/01/19 1350      Row Name 04/01/19 0810             Pain Scale: Numbers Pre/Post-Treatment    Pain Scale: Numbers, Pretreatment  0/10 - no pain  -BB      Pain Scale: Numbers, Post-Treatment  0/10 - no pain  -BB      Recorded by [BB] Munira Ferris COTA/L 04/01/19 1350      Row Name                Wound 03/26/19 2220 coccyx pressure injury    Wound - Properties Group Date first assessed: 03/26/19 [CN] Time first assessed: 2220 [CN] Present On Admission : yes;picture taken [CN] Location: coccyx [CN] Type: pressure injury [CN] Stage, Pressure Injury: Stage 2 [CN] Recorded by:   [CN] Roland Simpson RN 03/26/19 2241    Row Name                Wound 03/26/19 2220 Left lower gluteal pressure injury    Wound - Properties Group Date first assessed: 03/26/19 [CN] Time first assessed: 2220 [CN] Side: Left [CN] Orientation: lower [CN] Location: gluteal [CN] Type: pressure injury [CN] Stage, Pressure Injury: Stage 2 [CN] Recorded by:  [CN] Roland Simpson RN 03/26/19 2242    Row Name                Wound 03/27/19 1420 Left lower arm skin tear    Wound - Properties Group Date first assessed: 03/27/19 [BL] Time first assessed: 1420 [BL] Present On Admission : yes [BL] Side: Left [BL] Orientation: lower [BL] Location: arm [BL] Type: skin tear [BL] Recorded by:  [BL] Chelsea Chaves RN 03/27/19 1503    Row Name 04/01/19 0810             Plan of Care Review    Plan of Care Reviewed With  patient  -BB      Recorded by [BB] Munira Ferris COTA/L 04/01/19 1350      Row Name 04/01/19 0810             Outcome Summary/Treatment Plan (OT)    Daily Summary of Progress (OT)  progress toward functional goals is gradual  -BB      Plan for Continued Treatment (OT)  continue POC  -BB      Anticipated Discharge Disposition (OT)  skilled nursing facility  -BB      Recorded by [BB] Munira Ferris COTA/L 04/01/19 1350        User Key  (r) = Recorded By, (t) = Taken By, (c) = Cosigned By    Initials Name Effective Dates Discipline    BB Munira Ferris COTA/L 03/07/18 -  OT    BL Chelsea Chaves RN 07/26/16 -  Nurse    CN Roland Simpson RN 10/17/16 -  Nurse        Wound 03/26/19 2220 coccyx pressure injury (Active)   Dressing Appearance open to air 4/1/2019  8:10 AM   Closure Open to air 4/1/2019  8:10 AM   Base red/granulating 4/1/2019  8:10 AM   Periwound redness 4/1/2019  8:10 AM   Drainage Amount none 4/1/2019  8:10 AM   Care, Wound barrier applied 4/1/2019  8:10 AM   Periwound Care, Wound barrier ointment applied 3/31/2019  7:42 PM       Wound 03/26/19 2220 Left lower gluteal pressure  injury (Active)   Closure Open to air 4/1/2019  8:10 AM   Base red/granulating 4/1/2019  8:10 AM   Periwound blanchable;redness 4/1/2019  8:10 AM   Periwound Temperature warm 3/31/2019  7:42 PM   Periwound Skin Turgor soft 3/31/2019  7:42 PM   Drainage Amount none 4/1/2019  8:10 AM   Care, Wound barrier applied 4/1/2019  8:10 AM   Periwound Care, Wound barrier ointment applied 3/31/2019  7:42 PM       Wound 03/27/19 1420 Left lower arm skin tear (Active)   Dressing Appearance dry 4/1/2019  8:10 AM   Closure Open to air 4/1/2019  8:10 AM   Base pink;scab 4/1/2019  8:10 AM   Periwound dry 4/1/2019  8:10 AM   Periwound Temperature warm 4/1/2019  8:10 AM   Drainage Amount none 4/1/2019  8:10 AM   Care, Wound antimicrobial agent applied 4/1/2019  8:10 AM     Rehab Goal Summary     Row Name 04/01/19 0805             Occupational Therapy Goals    Bed Mobility Goal Selection (OT)  bed mobility, OT goal 1  -BB      Transfer Goal Selection (OT)  transfer, OT goal 1  -BB      Bathing Goal Selection (OT)  bathing, OT goal 1  -BB      Dressing Goal Selection (OT)  dressing, OT goal 1  -BB         Bed Mobility Goal 1 (OT)    Activity/Assistive Device (Bed Mobility Goal 1, OT)  bed mobility activities, all  -BB      [place] Level/Cues Needed (Bed Mobility Goal 1, OT)  contact guard assist  -BB      Time Frame (Bed Mobility Goal 1, OT)  long term goal (LTG)  -BB      Progress/Outcomes (Bed Mobility Goal 1, OT)  goal not met  -BB         Transfer Goal 1 (OT)    Activity/Assistive Device (Transfer Goal 1, OT)  transfers, all  -BB      [place] Level/Cues Needed (Transfer Goal 1, OT)  supervision required  -BB      Time Frame (Transfer Goal 1, OT)  long term goal (LTG)  -BB      Progress/Outcome (Transfer Goal 1, OT)  goal not met  -BB         Bathing Goal 1 (OT)    Activity/Assistive Device (Bathing Goal 1, OT)  bathing skills, all  -BB      [place] Level/Cues Needed (Bathing Goal 1, OT)  minimum assist (75% or more  patient effort)  -BB      Time Frame (Bathing Goal 1, OT)  long term goal (LTG)  -BB      Progress/Outcomes (Bathing Goal 1, OT)  goal not met  -BB         Dressing Goal 1 (OT)    Activity/Assistive Device (Dressing Goal 1, OT)  dressing skills, all  -BB      [place]/Cues Needed (Dressing Goal 1, OT)  minimum assist (75% or more patient effort)  -BB      Time Frame (Dressing Goal 1, OT)  long term goal (LTG)  -BB      Progress/Outcome (Dressing Goal 1, OT)  goal not met  -BB         Patient Education Goal (OT)    Activity (Patient Education Goal, OT)  B UE HEP for increased independence with functional mobility and ADLs, EC/WS and home safety/fall prev.  -BB      [place]/Cues/Accuracy (Memory Goal 2, OT)  demonstrates adequately;verbalizes understanding  -BB      Time Frame (Patient Education Goal, OT)  long term goal (LTG)  -BB      Progress/Outcome (Patient Education Goal, OT)  goal not met  -BB        User Key  (r) = Recorded By, (t) = Taken By, (c) = Cosigned By    Initials Name Provider Type Discipline    Munira Holliday COTA/L Occupational Therapy Assistant OT        Occupational Therapy Education     Title: PT OT SLP Therapies (In Progress)     Topic: Occupational Therapy (In Progress)     Point: ADL training (In Progress)     Description: Instruct learner(s) on proper safety adaptation and remediation techniques during self care or transfers.   Instruct in proper use of assistive devices.    Learning Progress Summary           Patient Acceptance, E, NR by ALMA ROSA at 3/28/2019 10:44 AM                   Point: Home exercise program (In Progress)     Description: Instruct learner(s) on appropriate technique for monitoring, assisting and/or progressing therapeutic exercises/activities.    Learning Progress Summary           Patient Acceptance, E, NR by ALMA ROSA at 4/1/2019  1:51 PM                   Point: Precautions (Done)     Description: Instruct learner(s) on prescribed precautions during self-care  and functional transfers.    Learning Progress Summary           Patient Acceptance, E, VU,NR by TO at 3/30/2019  3:04 PM    Comment:  Pt educated on benefits of OOB and EOB to increase strength, improve circulation, and increase ax tolerance. pt declined this tx, but agree to do next date.    Acceptance, E, VU,NR by RB at 3/27/2019  4:21 PM    Comment:  Edu pt on use of gait belt and non skid socks when OOB and no OOB without assist.                               User Key     Initials Effective Dates Name Provider Type Discipline    RB 06/15/16 -  Ryley Suarez, OT Occupational Therapist OT    BB 03/07/18 -  Munira Ferris COTA/L Occupational Therapy Assistant OT    TO 03/07/18 -  Alexis Tobias COTA/L Occupational Therapy Assistant OT              Non-skid socks and gait belt in place. Toileting offered. Call light and needs within reach. Pt advised to not get up alone and call the nurse for assistance.  Bed alarm on.   OT Recommendation and Plan  Outcome Summary/Treatment Plan (OT)  Daily Summary of Progress (OT): progress toward functional goals is gradual  Plan for Continued Treatment (OT): continue POC  Anticipated Discharge Disposition (OT): skilled nursing facility  Therapy Frequency (OT Eval): other (see comments)(5-7 days/wk)  Daily Summary of Progress (OT): progress toward functional goals is gradual  Plan of Care Review  Plan of Care Reviewed With: patient  Plan of Care Reviewed With: patient  Outcome Summary: Pt defers EOB/OOB this am. Pt also defers ADL, however states he will work on bed exercises. B UE exercises were performed with RBs as needed. No goals met this tx.  Outcome Measures     Row Name 04/01/19 0805 03/31/19 1030 03/31/19 1000       How much help from another person do you currently need...    Turning from your back to your side while in flat bed without using bedrails?  --  --  3  -RW    Moving from lying on back to sitting on the side of a flat bed without bedrails?  --  --  3   -RW    Moving to and from a bed to a chair (including a wheelchair)?  --  --  3  -RW    Standing up from a chair using your arms (e.g., wheelchair, bedside chair)?  --  --  3  -RW    Climbing 3-5 steps with a railing?  --  --  2  -RW    To walk in hospital room?  --  --  2  -RW    AM-PAC 6 Clicks Score  --  --  16  -RW       How much help from another is currently needed...    Putting on and taking off regular lower body clothing?  2  -BB  2  -BL  --    Bathing (including washing, rinsing, and drying)  2  -BB  2  -BL  --    Toileting (which includes using toilet bed pan or urinal)  2  -BB  2  -BL  --    Putting on and taking off regular upper body clothing  3  -BB  3  -BL  --    Taking care of personal grooming (such as brushing teeth)  3  -BB  3  -BL  --    Eating meals  3  -BB  3  -BL  --    Score  15  -BB  15  -BL  --       Functional Assessment    Outcome Measure Options  --  AM-PAC 6 Clicks Daily Activity (OT)  -BL  --    Row Name 03/30/19 1500 03/30/19 1420 03/29/19 1600       How much help from another person do you currently need...    Turning from your back to your side while in flat bed without using bedrails?  3  -RW  --  3  -JW    Moving from lying on back to sitting on the side of a flat bed without bedrails?  3  -RW  --  3  -JW    Moving to and from a bed to a chair (including a wheelchair)?  2  -RW  --  2  -JW    Standing up from a chair using your arms (e.g., wheelchair, bedside chair)?  2  -RW  --  3  -JW    Climbing 3-5 steps with a railing?  2  -RW  --  1  -JW    To walk in hospital room?  2  -RW  --  2  -JW    AM-PAC 6 Clicks Score  14  -RW  --  14  -JW       How much help from another is currently needed...    Putting on and taking off regular lower body clothing?  --  2  -TO  --    Bathing (including washing, rinsing, and drying)  --  2  -TO  --    Toileting (which includes using toilet bed pan or urinal)  --  2  -TO  --    Putting on and taking off regular upper body clothing  --  3  -TO  --     Taking care of personal grooming (such as brushing teeth)  --  3  -TO  --    Eating meals  --  3  -TO  --    Score  --  15  -TO  --       Functional Assessment    Outcome Measure Options  --  --  AM-PAC 6 Clicks Basic Mobility (PT)  -      User Key  (r) = Recorded By, (t) = Taken By, (c) = Cosigned By    Initials Name Provider Type    Todd Linton, PTA Physical Therapy Assistant    RW Dustin Sanchez, PTA Physical Therapy Assistant    Munira Holliday, JUANITO/L Occupational Therapy Assistant    Keri Lozano, SOLOMON/L Occupational Therapy Assistant    Alexis Warner, SOLOMON/L Occupational Therapy Assistant           Time Calculation:   Time Calculation- OT     Row Name 04/01/19 1353             Time Calculation- OT    OT Start Time  0805  -BB      OT Stop Time  0845  -BB      OT Time Calculation (min)  40 min  -BB      Total Timed Code Minutes- OT  40 minute(s)  -BB      OT Received On  04/01/19  -ALMA ROSA        User Key  (r) = Recorded By, (t) = Taken By, (c) = Cosigned By    Initials Name Provider Type    Munira Holliday COTA/L Occupational Therapy Assistant        Therapy Charges for Today     Code Description Service Date Service Provider Modifiers Qty    18553580316 HC OT SELF CARE/MGMT/TRAIN EA 15 MIN 4/1/2019 Munira Ferris COTA/L GO 1    90843202129 HC OT THER PROC EA 15 MIN 4/1/2019 Munira Ferris COTA/L GO 2               AUDI Smith  4/1/2019

## 2019-04-01 NOTE — PROGRESS NOTES
"   LOS: 6 days   Patient Care Team:  Seth Arce MD as PCP - General  [place], LIZET Douglas as PCP - Claims Attributed    Subjective     Subjective:  Symptoms:  He reports malaise, cough and weakness.  No shortness of breath.  (No  changes. ).    Diet:  Adequate intake.  No nausea or vomiting.    Activity level: Impaired due to weakness.    Pain:  He reports no pain.        History taken from: patient chart RN    Objective     Vital Signs  Temp:  [96 °F (35.6 °C)-96.8 °F (36 °C)] 96 °F (35.6 °C)  Heart Rate:  [62-79] 70  Resp:  [18-20] 20  BP: (105-125)/(40-63) 125/60    Objective:  General Appearance:  In no acute distress.    Vital signs: (most recent): Blood pressure 125/60, pulse 70, temperature 96 °F (35.6 °C), temperature source Oral, resp. rate 20, height 175.3 cm (69\"), weight 68.5 kg (151 lb), SpO2 93 %.  Vital signs are normal.  No fever.    Output: Producing urine.    HEENT: Normal HEENT exam.    Lungs:  Normal effort and normal respiratory rate.  There are decreased breath sounds.    Heart: Normal rate.  Regular rhythm.  S1 normal and S2 normal.  Positive for murmur.    Chest: Symmetric chest wall expansion.   Abdomen: Abdomen is soft and non-distended.  Bowel sounds are normal.   There is no abdominal tenderness.     Extremities: Normal range of motion.    Pulses: Distal pulses are intact.    Neurological: Patient is alert and oriented to person, place and time.  GCS score is 15.    Pupils:  Pupils are equal, round, and reactive to light.    Skin:  Warm, dry and pale.  No rash, ecchymosis or cyanosis.             Results Review:    Lab Results (last 24 hours)     Procedure Component Value Units Date/Time    Respiratory Culture - Sputum, Cough [[id number]] Collected:  03/30/19 1601    Specimen:  Sputum from Cough Updated:  04/01/19 0921     Respiratory Culture Scant growth (1+) Normal Respiratory Emily     Gram Stain Moderate (3+) WBCs per low power field      Few (2+) Epithelial cells per low " power field      Mixed bacterial tin    Extra Tubes [[id number]] Collected:  04/01/19 0609    Specimen:  Blood, Venous Line Updated:  04/01/19 0715    Narrative:       The following orders were created for panel order Extra Tubes.  Procedure                               Abnormality         Status                     ---------                               -----------         ------                     Gold Top - SST[[id number]]                                   Final result                 Please view results for these tests on the individual orders.    [facility] [[id number]] Collected:  04/01/19 0609    Specimen:  Blood Updated:  04/01/19 0715     Extra Tube Hold for add-ons.     Comment: Auto resulted.       Protime-INR [[id number]]  (Abnormal) Collected:  04/01/19 0609    Specimen:  Blood Updated:  04/01/19 0710     Protime 27.8 Seconds      INR 2.75    Narrative:       Therapeutic range for most indications is 2.0-3.0 INR,  or 2.5-3.5 for mechanical heart valves.    Basic Metabolic Panel [[id number]]  (Abnormal) Collected:  04/01/19 0609    Specimen:  Blood Updated:  04/01/19 0656     Glucose 91 mg/dL      BUN 42 mg/dL      Creatinine 1.32 mg/dL      Sodium 149 mmol/L      Potassium 3.6 mmol/L      Chloride 99 mmol/L      CO2 39.0 mmol/L      Calcium 9.0 mg/dL      eGFR Non African Amer 52 mL/min/1.73      BUN/Creatinine Ratio 31.8     Anion Gap 11.0 mmol/L     Narrative:       GFR Normal >60  Chronic Kidney Disease <60  Kidney Failure <15    CBC & Differential [[id number]] Collected:  04/01/19 0609    Specimen:  Blood Updated:  04/01/19 0639    Narrative:       The following orders were created for panel order CBC & Differential.  Procedure                               Abnormality         Status                     ---------                               -----------         ------                     CBC Auto Differential[[id number]]        Abnormal            Final result                 Please view results  for these tests on the individual orders.    CBC Auto Differential [[id number]]  (Abnormal) Collected:  04/01/19 0609    Specimen:  Blood Updated:  04/01/19 0639     WBC 10.40 10*3/mm3      RBC 2.80 10*6/mm3      Hemoglobin 7.5 g/dL      Hematocrit 25.1 %      MCV 89.6 fL      MCH 26.8 pg      MCHC 29.9 g/dL      RDW 16.6 %      RDW-SD 54.5 fl      MPV 11.3 fL      Platelets 214 10*3/mm3      Neutrophil % 83.7 %      Lymphocyte % 6.6 %      Monocyte % 7.1 %      Eosinophil % 1.9 %      Basophil % 0.3 %      Immature Grans % 0.4 %      Neutrophils, Absolute 8.70 10*3/mm3      Lymphocytes, Absolute 0.69 10*3/mm3      Monocytes, Absolute 0.74 10*3/mm3      Eosinophils, Absolute 0.20 10*3/mm3      Basophils, Absolute 0.03 10*3/mm3      Immature Grans, Absolute 0.04 10*3/mm3      nRBC 0.0 /100 WBC          Imaging Results (last 24 hours)     ** No results found for the last 24 hours. **           I reviewed the patient's new clinical results.  I reviewed the patient's new imaging results and agree with the interpretation.  I reviewed the patient's other test results and agree with the interpretation      Assessment/Plan       Pneumonia of left lower lobe due to infectious organism (CMS/[facility], located within [facility])    Atrial fibrillation [I48.91]    Long-term (current) use of anticoagulants    COPD (chronic obstructive pulmonary disease) (CMS/[facility]AC, located within [facility])    Hypertension    Hyperlipidemia      Assessment & Plan    1. Bacteremia  2. UTI cystitis   3. History of left hydronephrosis with J-stent in place  -2/28/19 cystoscopy left ureteroscopy (findings: ), J-stent placed--pathology is negative for neoplasm  -WBC  15.52-->11.96-->10.62-->9.65-->9.66-->10.40  -Estimated Creatinine Clearance: 41.8 mL/min (A) (by C-G formula based on SCr of 1.32 mg/dL (H)).   -Cr 1.46-->1.51-->1.33-->1.30-->1.32  -3/26/19 urine and blood cultures positive for Pseudomonas aeruginosa  -Cefepime day #6  -Flomax      Plan:   Continue Flomax and antibiotic  Plan removal of J-stent  on Tuesday PM.     LIZET Walters  04/01/19  9:53 AM

## 2019-04-01 NOTE — PLAN OF CARE
Problem: Patient Care Overview  Goal: Plan of Care Review  Outcome: Ongoing (interventions implemented as appropriate)   04/01/19 6585   OTHER   Outcome Summary pt. needs max encouragement to work with therapy. pt. deferred EOB/OOB and did complete supine therex   Coping/Psychosocial   Plan of Care Reviewed With patient   Plan of Care Review   Progress no change

## 2019-04-01 NOTE — PROGRESS NOTES
"Anticoagulation by Pharmacy - Warfarin    Seth Montiel is a 83 y.o.male  [Ht: 175.3 cm (69\"); Wt: 68.5 kg (151 lb)] on Warfarin 1 mg PO  for indication of a-fib.    Goal INR: 2-3  PTA: 4mg daily except 2mg on Tues/Fri (3.4mg average daily dose)      Today's INR:   Lab Results   Component Value Date    INR 2.75 (H) 04/01/2019         Lab Results   Component Value Date    INR 2.75 (H) 04/01/2019    INR 3.11 (H) 03/31/2019    INR 3.11 (H) 03/30/2019    PROTIME 27.8 (H) 04/01/2019    PROTIME 30.5 (H) 03/31/2019    PROTIME 30.5 (H) 03/30/2019     Lab Results   Component Value Date    HGB 7.5 (L) 04/01/2019    HGB 7.2 (L) 03/31/2019    HGB 7.6 (L) 03/30/2019     Lab Results   Component Value Date    HCT 25.1 (L) 04/01/2019    HCT 22.7 (L) 03/31/2019    HCT 25.4 (L) 03/30/2019     Lab Results   Component Value Date     04/01/2019     03/31/2019     03/30/2019       Recent Warfarin Administrations       The 5 most recent administrations since 03/25/2019 are shown below each listed medication.    Warfarin Sodium         Order Dose Date Given     warfarin (COUMADIN) tablet 1 mg 1 mg 03/31/2019     warfarin (COUMADIN) tablet 1 mg 1 mg 03/29/2019     warfarin (COUMADIN) tablet 2.5 mg 2.5 mg 03/28/2019      2.5 mg 03/27/2019                      Assessment/Plan:  INR 2.75, in range and trending down  H&H/plt stable  Concurrent meds: ASA, synthroid    Down trend anticipated after held dose on 3/30, will continue 1mg daily to allow trend to develop    1. Warfarin 1mg QHS  2. PT/INR QAM      Kyle Hampton ARIAN  04/01/19 3:59 PM     "

## 2019-04-01 NOTE — DISCHARGE PLACEMENT REQUEST
"Saida Cui (83 y.o. Male)     Date of Birth Social Security Number Address Home Phone MRN    [id number]  [id number] [place] [address] [phone] [id number]    Mormonism Marital Status          Restoration        Admission Date Admission Type Admitting Provider Attending Provider Department, Room/Bed    3/26/19 Emergency Guy Evans MD Gibson, Bryce, MD [address], [facility]/1    Discharge Date Discharge Disposition Discharge Destination                       Attending Provider:  Guy Evans MD    Allergies:  No Known Allergies    Isolation:  Contact   Infection:  None   Code Status:  No CPR    Ht:  175.3 cm (69\")   Wt:  68.5 kg (151 lb)    Admission Cmt:  None   Principal Problem:  Pneumonia of left lower lobe due to infectious organism (CMS/[facility]) [J18.1]                 Active Insurance as of 3/26/2019     Primary Coverage     Payor Plan Insurance Group Employer/Plan Group    MEDICARE MEDICARE A & B      Payor Plan Address Payor Plan Phone Number Payor Plan Fax Number Effective Dates    [address] [phone]  12/1/2001 - None Entered    [facility] [id number]       Subscriber Name Subscriber Birth Date Member ID       SAIDA CUI 1935 [id number]           Secondary Coverage     Payor Plan Insurance Group Employer/Plan Group    [employer] MED SUPP AAR HEALTH CARE OPTIONS [id number]     Payor Plan Address Payor Plan Phone Number Payor Plan Fax Number Effective Dates    [facility] [phone]  12/1/2001 - None Entered    [address]       [facility] [id number]       Subscriber Name Subscriber Birth Date Member ID       SAIDA CUI 1935 [id number]                 Emergency Contacts      (Rel.) Home Phone Work Phone Mobile Phone    Alyssa Salcedo (Daughter) [phone] -- --    Phoebe Perkins (Grandchild) [phone] -- [phone]              "

## 2019-04-01 NOTE — PLAN OF CARE
Problem: Patient Care Overview  Goal: Plan of Care Review  Outcome: Ongoing (interventions implemented as appropriate)   04/01/19 3730   OTHER   Outcome Summary Pt reports no pain at this time. MBC applied to coccyx. Contact precautions maintained.   Coping/Psychosocial   Plan of Care Reviewed With patient   Plan of Care Review   Progress no change     Goal: Individualization and Mutuality  Outcome: Ongoing (interventions implemented as appropriate)      Problem: Fall Risk (Adult)  Goal: Absence of Fall  Outcome: Ongoing (interventions implemented as appropriate)      Problem: Skin Injury Risk (Adult)  Goal: Skin Health and Integrity  Outcome: Ongoing (interventions implemented as appropriate)      Problem: Wound (Includes Pressure Injury) (Adult)  Goal: Signs and Symptoms of Listed Potential Problems Will be Absent, Minimized or Managed (Wound)  Outcome: Ongoing (interventions implemented as appropriate)      Problem: Pneumonia (Adult)  Goal: Signs and Symptoms of Listed Potential Problems Will be Absent, Minimized or Managed (Pneumonia)  Outcome: Ongoing (interventions implemented as appropriate)      Problem: Chronic Obstructive Pulmonary Disease (Adult)  Goal: Signs and Symptoms of Listed Potential Problems Will be Absent, Minimized or Managed (Chronic Obstructive Pulmonary Disease)  Outcome: Ongoing (interventions implemented as appropriate)

## 2019-04-01 NOTE — PLAN OF CARE
Problem: Patient Care Overview  Goal: Plan of Care Review  Outcome: Ongoing (interventions implemented as appropriate)   04/01/19 4038   OTHER   Outcome Summary Pt defers EOB/OOB this am. Pt also defers ADL, however states he will work on bed exercises. B UE exercises were performed with RBs as needed. No goals met this tx.   Coping/Psychosocial   Plan of Care Reviewed With patient   Plan of Care Review   Progress no change

## 2019-04-01 NOTE — PROGRESS NOTES
[facility] Medicine Services  INPATIENT PROGRESS NOTE    Length of Stay: 6  Date of Admission: 3/26/2019  Primary Care Physician: Seth Arce MD    Subjective   Chief Complaint: Constipation.  HPI:    Seth Montiel is a 83 y.o. male with medical history significant for COPD, atrial fibrillation, porcine valve replacement, hyperlipidemia, hypertension, and history of tobacco abuse presents with cough and weakness.      Patient was seen at his primary care provider's office for follow-up after discharge from skilled nursing facility.  Patient does report a productive cough with some fatigue.  Patient was noted to have left lower lobe pneumonia on chest x-ray and a supratherapeutic INR of greater than 8.  As such patient was sent to the emergency department for further evaluation.     Patient is a frail-appearing gentleman with barrel chest noted on exam.  Patient does have chronic hypoxic respiratory failure for which he uses 4 L/min nasal cannula and at time of exam is currently on 4 L/min nasal cannula.  Patient does endorse some fatigue, weakness, productive cough but denied any fever, chills, nausea, vomiting, chest pain, or diaphoresis    He is doing better today, remains deconditioned, short of air on exertion and remains on his baseline supplemental oxygen of 4 L nasal prongs and maintaining saturation in the low 90s.  He complains of constipation and has not had a bowel movement in 5 days.    Review of Systems   Constitutional: Positive for activity change and fatigue. Negative for appetite change, chills, diaphoresis and fever.   HENT: Negative for trouble swallowing and voice change.    Eyes: Negative for photophobia and visual disturbance.   Respiratory: Positive for cough and shortness of breath. Negative for choking, chest tightness, wheezing and stridor.    Cardiovascular: Positive for leg swelling. Negative for chest pain and palpitations.    Gastrointestinal: Positive for constipation. Negative for abdominal distention, abdominal pain, blood in stool, diarrhea, nausea and vomiting.   Endocrine: Negative for cold intolerance, heat intolerance, polydipsia, polyphagia and polyuria.   Genitourinary: Negative for decreased urine volume, difficulty urinating, dysuria, enuresis, flank pain, frequency, hematuria and urgency.   Musculoskeletal: Negative for arthralgias, gait problem, myalgias, neck pain and neck stiffness.   Skin: Negative for pallor, rash and wound.   Neurological: Positive for weakness. Negative for dizziness, tremors, seizures, syncope, facial asymmetry, speech difficulty, light-headedness, numbness and headaches.   Hematological: Does not bruise/bleed easily.   Psychiatric/Behavioral: Negative for agitation, behavioral problems and confusion.       Objective    Temp:  [96 °F (35.6 °C)-98.7 °F (37.1 °C)] 98.7 °F (37.1 °C)  Heart Rate:  [62-79] 76  Resp:  [18-20] 20  BP: (108-126)/(40-60) 126/60    Physical Exam   Constitutional: He is oriented to person, place, and time. He appears well-developed and well-nourished. No distress.   HENT:   Head: Normocephalic and atraumatic.   Eyes: EOM are normal. Pupils are equal, round, and reactive to light. No scleral icterus.   Neck: Normal range of motion. Neck supple. No JVD present. No thyromegaly present.   Cardiovascular: Normal rate and normal heart sounds. An irregularly irregular rhythm present. Exam reveals no gallop and no friction rub.   No murmur heard.  Pulmonary/Chest: Effort normal. He has decreased breath sounds. He has no wheezes. He has rhonchi. He has no rales. He exhibits no tenderness.   Abdominal: Soft. Bowel sounds are normal. He exhibits no distension and no mass. There is no tenderness. There is no rebound and no guarding.   Musculoskeletal: He exhibits edema. He exhibits no tenderness or deformity.   Neurological: He is alert and oriented to person, place, and time. No cranial  nerve deficit. He exhibits normal muscle tone. Coordination normal.   Skin: Skin is warm and dry. No rash noted. He is not diaphoretic. No erythema. No pallor.   He has trace edema both legs with chronic venous stasis changes.   Psychiatric: He has a normal mood and affect. His behavior is normal. Judgment and thought content normal.   Nursing note and vitals reviewed.        Medication Review:    Current Facility-Administered Medications:   •  aspirin EC tablet 81 mg, 81 mg, Oral, Daily, Guy Evans MD, 81 mg at 04/01/19 0907  •  atorvastatin (LIPITOR) tablet 10 mg, 10 mg, Oral, Nightly, Guy Evans MD, 10 mg at 03/31/19 2023  •  bacitracin ointment, , Topical, Daily, Guy Evans MD  •  cefepime (MAXIPIME) 2 g/100 mL 0.9% NS (mbp), 2 g, Intravenous, Q12H, Guy Evans MD, Last Rate: 200 mL/hr at 04/01/19 0246, 2 g at 04/01/19 0246  •  dextrose (D5W) 5 % infusion, 50 mL/hr, Intravenous, Continuous, Austin Chaudhry MD, Last Rate: 50 mL/hr at 04/01/19 1041, 50 mL/hr at 04/01/19 1041  •  diltiaZEM CD (CARDIZEM CD) 24 hr capsule 240 mg, 240 mg, Oral, Nightly, Guy Evans MD, 240 mg at 03/31/19 2023  •  ferrous sulfate EC tablet 324 mg, 324 mg, Oral, BID With Meals, Guy Evans MD, 324 mg at 04/01/19 0906  •  flecainide (TAMBOCOR) tablet 50 mg, 50 mg, Oral, Q12H, Guy Evans MD, 50 mg at 04/01/19 0905  •  folic acid (FOLVITE) tablet 1 mg, 1 mg, Oral, Daily, Guy Evans MD, 1 mg at 04/01/19 0907  •  furosemide (LASIX) tablet 20 mg, 20 mg, Oral, BID, Guy Evans MD, 20 mg at 04/01/19 0906  •  ipratropium-albuterol (DUO-NEB) nebulizer solution 3 mL, 3 mL, Nebulization, 4x Daily - RT, Guy Evans MD, 3 mL at 03/31/19 1914  •  levothyroxine (SYNTHROID, LEVOTHROID) tablet 50 mcg, 50 mcg, Oral, QAM AC, Guy Evans MD, 50 mcg at 04/01/19 0906  •  magic butt ointment, , Topical, BID, Guy Evans MD  •  melatonin tablet 6 mg, 6 mg, Oral, Nightly PRN, Guy Evans MD  •  Pharmacy to dose  warfarin, , Does not apply, Continuous PRN, Guy Evans MD  •  polyethylene glycol (MIRALAX) powder 17 g, 17 g, Oral, BID, Luis M Gaston MD, 17 g at 04/01/19 0907  •  sodium chloride 0.9 % flush 10 mL, 10 mL, Intravenous, PRN, Demar Finch MD, 10 mL at 04/01/19 0907  •  sodium chloride 0.9 % flush 3 mL, 3 mL, Intravenous, Q12H, Guy Evans MD, 3 mL at 03/31/19 2024  •  sodium chloride 0.9 % flush 3-10 mL, 3-10 mL, Intravenous, PRN, Guy Evans MD, 10 mL at 03/29/19 1710  •  tamsulosin (FLOMAX) 24 hr capsule 0.4 mg, 0.4 mg, Oral, Nightly, Guy Evans MD, 0.4 mg at 03/31/19 2023  •  vitamin B-12 (CYANOCOBALAMIN) tablet 1,000 mcg, 1,000 mcg, Oral, Daily, Guy Evans MD, 1,000 mcg at 04/01/19 0906  •  warfarin (COUMADIN) tablet 1 mg, 1 mg, Oral, Daily, Guy Evans MD, 1 mg at 03/31/19 1719    Results Review:  I have reviewed the labs, radiology results, and diagnostic studies.    Laboratory Data:   Results from last 7 days   Lab Units 04/01/19  0609 03/31/19  0600 03/30/19  0629  03/26/19  1700   SODIUM mmol/L 149* 145 148*   < > 147*   POTASSIUM mmol/L 3.6 4.0 3.8   < > 4.1   CHLORIDE mmol/L 99 94* 95*   < > 94*   CO2 mmol/L 39.0* 40.0* 41.0*   < > 39.0*   BUN mg/dL 42* 41* 41*   < > 31*   CREATININE mg/dL 1.32* 1.30* 1.33*   < > 1.43*   GLUCOSE mg/dL 91 93 87   < > 83   CALCIUM mg/dL 9.0 8.7 8.8   < > 9.8   BILIRUBIN mg/dL  --   --   --   --  0.5   ALK PHOS U/L  --   --   --   --  103   ALT (SGPT) U/L  --   --   --   --  14   AST (SGOT) U/L  --   --   --   --  27   ANION GAP mmol/L 11.0 11.0 12.0   < > 14.0    < > = values in this interval not displayed.     Estimated Creatinine Clearance: 41.8 mL/min (A) (by C-G formula based on SCr of 1.32 mg/dL (H)).          Results from last 7 days   Lab Units 04/01/19  0609 03/31/19  0600 03/30/19  0629 03/29/19  0602 03/28/19  0536   WBC 10*3/mm3 10.40 9.66 9.65 10.62 11.96*   HEMOGLOBIN g/dL 7.5* 7.2* 7.6* 7.7* 7.2*   HEMATOCRIT % 25.1* 22.7* 25.4*  25.1* 23.1*   PLATELETS 10*3/mm3 214 198 200 196 172     Results from last 7 days   Lab Units 04/01/19  0609 03/31/19  0601 03/30/19  0629 03/29/19  0603 03/28/19  0536   INR  2.75* 3.11* 3.11* 2.60* 1.90*       Culture Data:   No results found for: BLOODCX  No results found for: URINECX  Respiratory Culture   Date Value Ref Range Status   03/30/2019 Scant growth (1+) Normal Respiratory Emily  Preliminary     No results found for: WOUNDCX  No results found for: STOOLCX  No components found for: BODYFLD    Radiology Data:   Imaging Results (last 24 hours)     ** No results found for the last 24 hours. **          I have reviewed the patient's current medications.     Assessment/Plan     Hospital Problem List:  Principal Problem:    Pneumonia of left lower lobe due to infectious organism (CMS/[facility])  Active Problems:    Atrial fibrillation [I48.91]    Long-term (current) use of anticoagulants    COPD (chronic obstructive pulmonary disease) (CMS/[facility])    Hypertension    Hyperlipidemia    -Left lower lobe pneumonia with Pseudomonas bacteremia: Patient was initially on meropenem and has been transitioned to cefepime.    Acute cystitis: Urine culture is positive for Pseudomonas.  Continue cefepime.    -Acute exacerbation of COPD: Improved as patient is back to his baseline supplemental oxygen.  Continue supplemental oxygen, bronchodilators and steroids.    -Chronic atrial fibrillation: Patient is in controlled ventricular response.  Continue rate control.  Anticoagulation is on hold due to supratherapeutic INR.  He has received vitamin K therapy and Pharmacy is monitoring warfarin.  Continue to monitor INR.     -History of left-sided hydronephrosis status post J stent placement: Continue Flomax and IV antibiotics.  Patient is scheduled for removal of J stent in am.  Urologist is following.      -History of severe aortic stenosis: Patient is on routine surveillance by his primary cardiologist.  Echocardiogram done on  3/27/2019 showed:  · Left atrial cavity size is moderate-to-severely dilated.  · Severe aortic valve stenosis is present.  · Mild mitral valve regurgitation is present  · Mild tricuspid valve regurgitation is present.  · Estimated EF = 65%.  · Left ventricular systolic function is normal.  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    -Hypernatremia: Continue D5W.        -Chronic kidney disease stage III:  Patient's baseline creatinine is between 1.4-1.65.  Creatinine remains stable and at baseline.  Continue to monitor.    -Anemia of chronic inflammation: Hemoglobin remains stable.  Continue iron supplementation, routine monitoring of hemoglobin and transfuse if the need arises.    -Dependent edema both legs with chronic stasis changes: Improving with diuretics.    -Constipation: Begin bowel regimen.    Deconditioning: Continue PT and OT.    Discharge Planning: In progress.    Austin Chaudhry MD   04/01/19   11:42 AM

## 2019-04-01 NOTE — PLAN OF CARE
Problem: Patient Care Overview  Goal: Plan of Care Review  Outcome: Ongoing (interventions implemented as appropriate)   04/01/19 0021   Coping/Psychosocial   Plan of Care Reviewed With patient   Plan of Care Review   Progress no change     Goal: Individualization and Mutuality  Outcome: Ongoing (interventions implemented as appropriate)    Goal: Discharge Needs Assessment  Outcome: Ongoing (interventions implemented as appropriate)    Goal: Interprofessional Rounds/Family Conf  Outcome: Ongoing (interventions implemented as appropriate)      Problem: Fall Risk (Adult)  Goal: Absence of Fall  Outcome: Ongoing (interventions implemented as appropriate)      Problem: Skin Injury Risk (Adult)  Goal: Skin Health and Integrity  Outcome: Ongoing (interventions implemented as appropriate)      Problem: Wound (Includes Pressure Injury) (Adult)  Goal: Signs and Symptoms of Listed Potential Problems Will be Absent, Minimized or Managed (Wound)  Outcome: Ongoing (interventions implemented as appropriate)      Problem: Pneumonia (Adult)  Goal: Signs and Symptoms of Listed Potential Problems Will be Absent, Minimized or Managed (Pneumonia)  Outcome: Ongoing (interventions implemented as appropriate)      Problem: Chronic Obstructive Pulmonary Disease (Adult)  Goal: Signs and Symptoms of Listed Potential Problems Will be Absent, Minimized or Managed (Chronic Obstructive Pulmonary Disease)  Outcome: Ongoing (interventions implemented as appropriate)

## 2019-04-02 NOTE — INTERVAL H&P NOTE
H&P reviewed. The patient was examined and there are no changes to the H&P.   risk and benefits have been discussed

## 2019-04-02 NOTE — PROGRESS NOTES
"   LOS: 7 days   Patient Care Team:  Seth Arce MD as PCP - General  Savanna, LIZET Douglas as PCP - Claims Attributed    Subjective     Subjective:  Symptoms:  He reports malaise, cough and weakness.  No shortness of breath.  (No  changes. NPO for OR this PM. ).    Diet:  NPO.  No nausea or vomiting.    Activity level: Impaired due to weakness.    Pain:  He reports no pain.        History taken from: patient chart RN    Objective     Vital Signs  Temp:  [96 °F (35.6 °C)-98.7 °F (37.1 °C)] 96.1 °F (35.6 °C)  Heart Rate:  [61-85] 61  Resp:  [16-20] 20  BP: (100-115)/(52-61) 100/55    Objective:  General Appearance:  In no acute distress.    Vital signs: (most recent): Blood pressure 100/55, pulse 61, temperature 96.1 °F (35.6 °C), temperature source Axillary, resp. rate 20, height 175.3 cm (69\"), weight 68.5 kg (151 lb), SpO2 91 %.  Vital signs are normal.  No fever.    Output: Producing urine.    HEENT: Normal HEENT exam.    Lungs:  Normal effort and normal respiratory rate.  There are decreased breath sounds.    Heart: Normal rate.  Regular rhythm.  S1 normal and S2 normal.  Positive for murmur.    Chest: Symmetric chest wall expansion.   Abdomen: Abdomen is soft and non-distended.  Bowel sounds are normal.   There is no abdominal tenderness.     Extremities: Normal range of motion.    Pulses: Distal pulses are intact.    Neurological: Patient is alert and oriented to person, place and time.  GCS score is 15.    Pupils:  Pupils are equal, round, and reactive to light.    Skin:  Warm, dry and pale.  No rash, ecchymosis or cyanosis.             Results Review:    Lab Results (last 24 hours)     Procedure Component Value Units Date/Time    Respiratory Culture - Sputum, Cough [[id number]] Collected:  03/30/19 1601    Specimen:  Sputum from Cough Updated:  04/02/19 0838     Respiratory Culture Scant growth (1+) Normal Respiratory Emily     Gram Stain Moderate (3+) WBCs per low power field      Few (2+) " Epithelial cells per low power field      Mixed bacterial tin    Basic Metabolic Panel [[id number]]  (Abnormal) Collected:  04/02/19 0614    Specimen:  Blood Updated:  04/02/19 0712     Glucose 99 mg/dL      BUN 41 mg/dL      Creatinine 1.29 mg/dL      Sodium 144 mmol/L      Potassium 3.7 mmol/L      Chloride 98 mmol/L      CO2 39.0 mmol/L      Calcium 9.0 mg/dL      eGFR Non African Amer 53 mL/min/1.73      BUN/Creatinine Ratio 31.8     Anion Gap 7.0 mmol/L     Narrative:       GFR Normal >60  Chronic Kidney Disease <60  Kidney Failure <15    Protime-INR [[id number]]  (Abnormal) Collected:  04/02/19 0614    Specimen:  Blood Updated:  04/02/19 0704     Protime 27.1 Seconds      INR 2.66    Narrative:       Therapeutic range for most indications is 2.0-3.0 INR,  or 2.5-3.5 for mechanical heart valves.    CBC & Differential [[id number]] Collected:  04/02/19 0614    Specimen:  Blood Updated:  04/02/19 0644    Narrative:       The following orders were created for panel order CBC & Differential.  Procedure                               Abnormality         Status                     ---------                               -----------         ------                     CBC Auto Differential[[id number]]        Abnormal            Final result                 Please view results for these tests on the individual orders.    CBC Auto Differential [[id number]]  (Abnormal) Collected:  04/02/19 0614    Specimen:  Blood Updated:  04/02/19 0644     WBC 10.27 10*3/mm3      RBC 2.74 10*6/mm3      Hemoglobin 7.6 g/dL      Hematocrit 24.7 %      MCV 90.1 fL      MCH 27.7 pg      MCHC 30.8 g/dL      RDW 16.6 %      RDW-SD 54.5 fl      MPV 11.1 fL      Platelets 206 10*3/mm3      Neutrophil % 83.2 %      Lymphocyte % 7.5 %      Monocyte % 6.7 %      Eosinophil % 1.9 %      Basophil % 0.3 %      Immature Grans % 0.4 %      Neutrophils, Absolute 8.54 10*3/mm3      Lymphocytes, Absolute 0.77 10*3/mm3      Monocytes, Absolute 0.69 10*3/mm3       Eosinophils, Absolute 0.20 10*3/mm3      Basophils, Absolute 0.03 10*3/mm3      Immature Grans, Absolute 0.04 10*3/mm3      nRBC 0.0 /100 WBC          Imaging Results (last 24 hours)     ** No results found for the last 24 hours. **           I reviewed the patient's new clinical results.  I reviewed the patient's new imaging results and agree with the interpretation.  I reviewed the patient's other test results and agree with the interpretation      Assessment/Plan       Pneumonia of left lower lobe due to infectious organism (CMS/HCC)    Atrial fibrillation [I48.91]    Long-term (current) use of anticoagulants    COPD (chronic obstructive pulmonary disease) (CMS/HCC)    Hydronephrosis    Hypertension    Hyperlipidemia      Assessment & Plan    1. Bacteremia  2. UTI cystitis   3. History of left hydronephrosis with J-stent in place  -2/28/19 cystoscopy left ureteroscopy (findings: ), J-stent placed--pathology is negative for neoplasm  -WBC  15.52-->11.96-->10.62-->9.65-->9.66-->10.40-->10.27  -Estimated Creatinine Clearance: 42.8 mL/min (A) (by C-G formula based on SCr of 1.29 mg/dL (H)).   -Cr 1.46-->1.51-->1.33-->1.30-->1.32-->1.29  -INR 2.66  -3/26/19 urine and blood cultures positive for Pseudomonas aeruginosa  -Cefepime day #7  -Flomax      Plan:   Continue Flomax and antibiotic  Plan removal of J-stent this PM with Dr. Johnson, NPO    Enma Mchugh, LIZET  04/02/19  11:51 AM

## 2019-04-02 NOTE — PROGRESS NOTES
LOS: 7 days     Patient Care Team:  Seth Arce MD as PCP - General  Savanna, LIZET Douglas as PCP - Claims Attributed      Subjective     Retained left ureteral stent    Objective       Vital Signs  Temp:  [96 °F (35.6 °C)-98 °F (36.7 °C)] 98 °F (36.7 °C)  Heart Rate:  [61-85] 78  Resp:  [16-24] 24  BP: (100-115)/(52-61) 112/56    Physical Exam:        General Appearance:   No distress     Respiratory:    UNLABORED RESPIRATIONS.     Abdomen:     SOFT.       Genitourinary:   Urine clear     Rectal:     DEFERRED       Results Review:       Imaging Results (last 24 hours)     ** No results found for the last 24 hours. **        Lab Results (last 24 hours)     Procedure Component Value Units Date/Time    Blood Culture - Blood, Arm, Left [[id number]]  (Abnormal) Collected:  03/26/19 1926    Specimen:  Blood from Arm, Left Updated:  04/02/19 1456     Blood Culture Abnormal Stain      Pseudomonas aeruginosa     Comment: for sensitivity see previous report          Gram Stain Gram negative bacilli    Narrative:       2 of 4 bottles positive for Gram Negative Rods.     Respiratory Culture - Sputum, Cough [[id number]] Collected:  03/30/19 1601    Specimen:  Sputum from Cough Updated:  04/02/19 0838     Respiratory Culture Scant growth (1+) Normal Respiratory Tin     Gram Stain Moderate (3+) WBCs per low power field      Few (2+) Epithelial cells per low power field      Mixed bacterial tin    Basic Metabolic Panel [[id number]]  (Abnormal) Collected:  04/02/19 0614    Specimen:  Blood Updated:  04/02/19 0712     Glucose 99 mg/dL      BUN 41 mg/dL      Creatinine 1.29 mg/dL      Sodium 144 mmol/L      Potassium 3.7 mmol/L      Chloride 98 mmol/L      CO2 39.0 mmol/L      Calcium 9.0 mg/dL      eGFR Non African Amer 53 mL/min/1.73      BUN/Creatinine Ratio 31.8     Anion Gap 7.0 mmol/L     Narrative:       GFR Normal >60  Chronic Kidney Disease <60  Kidney Failure <15    Protime-INR [[id number]]  (Abnormal) Collected:   04/02/19 0614    Specimen:  Blood Updated:  04/02/19 0704     Protime 27.1 Seconds      INR 2.66    Narrative:       Therapeutic range for most indications is 2.0-3.0 INR,  or 2.5-3.5 for mechanical heart valves.    CBC & Differential [[id number]] Collected:  04/02/19 0614    Specimen:  Blood Updated:  04/02/19 0644    Narrative:       The following orders were created for panel order CBC & Differential.  Procedure                               Abnormality         Status                     ---------                               -----------         ------                     CBC Auto Differential[[id number]]        Abnormal            Final result                 Please view results for these tests on the individual orders.    CBC Auto Differential [[id number]]  (Abnormal) Collected:  04/02/19 0614    Specimen:  Blood Updated:  04/02/19 0644     WBC 10.27 10*3/mm3      RBC 2.74 10*6/mm3      Hemoglobin 7.6 g/dL      Hematocrit 24.7 %      MCV 90.1 fL      MCH 27.7 pg      MCHC 30.8 g/dL      RDW 16.6 %      RDW-SD 54.5 fl      MPV 11.1 fL      Platelets 206 10*3/mm3      Neutrophil % 83.2 %      Lymphocyte % 7.5 %      Monocyte % 6.7 %      Eosinophil % 1.9 %      Basophil % 0.3 %      Immature Grans % 0.4 %      Neutrophils, Absolute 8.54 10*3/mm3      Lymphocytes, Absolute 0.77 10*3/mm3      Monocytes, Absolute 0.69 10*3/mm3      Eosinophils, Absolute 0.20 10*3/mm3      Basophils, Absolute 0.03 10*3/mm3      Immature Grans, Absolute 0.04 10*3/mm3      nRBC 0.0 /100 WBC             I reviewed the patient's new clinical results.  I reviewed the patient's new imaging results and agree with the interpretation.  I reviewed the patient's other test results and agree with the interpretation        Assessment/Plan       Pneumonia of left lower lobe due to infectious organism (CMS/HCC)    Atrial fibrillation [I48.91]    Long-term (current) use of anticoagulants    COPD (chronic obstructive pulmonary disease) (CMS/HCC)     Hydronephrosis    Hypertension    Hyperlipidemia      Cystoscopy ureteroscopy removal stent      Holland Johnson MD  04/02/19  6:33 PM

## 2019-04-02 NOTE — H&P (VIEW-ONLY)
LOS: 7 days     Patient Care Team:  Seth Arce MD as PCP - General  Savanna, LIZET Douglas as PCP - Claims Attributed      Subjective     Retained left ureteral stent    Objective       Vital Signs  Temp:  [96 °F (35.6 °C)-98 °F (36.7 °C)] 98 °F (36.7 °C)  Heart Rate:  [61-85] 78  Resp:  [16-24] 24  BP: (100-115)/(52-61) 112/56    Physical Exam:        General Appearance:   No distress     Respiratory:    UNLABORED RESPIRATIONS.     Abdomen:     SOFT.       Genitourinary:   Urine clear     Rectal:     DEFERRED       Results Review:       Imaging Results (last 24 hours)     ** No results found for the last 24 hours. **        Lab Results (last 24 hours)     Procedure Component Value Units Date/Time    Blood Culture - Blood, Arm, Left [[id number]]  (Abnormal) Collected:  03/26/19 1926    Specimen:  Blood from Arm, Left Updated:  04/02/19 1456     Blood Culture Abnormal Stain      Pseudomonas aeruginosa     Comment: for sensitivity see previous report          Gram Stain Gram negative bacilli    Narrative:       2 of 4 bottles positive for Gram Negative Rods.     Respiratory Culture - Sputum, Cough [[id number]] Collected:  03/30/19 1601    Specimen:  Sputum from Cough Updated:  04/02/19 0838     Respiratory Culture Scant growth (1+) Normal Respiratory Tin     Gram Stain Moderate (3+) WBCs per low power field      Few (2+) Epithelial cells per low power field      Mixed bacterial tin    Basic Metabolic Panel [[id number]]  (Abnormal) Collected:  04/02/19 0614    Specimen:  Blood Updated:  04/02/19 0712     Glucose 99 mg/dL      BUN 41 mg/dL      Creatinine 1.29 mg/dL      Sodium 144 mmol/L      Potassium 3.7 mmol/L      Chloride 98 mmol/L      CO2 39.0 mmol/L      Calcium 9.0 mg/dL      eGFR Non African Amer 53 mL/min/1.73      BUN/Creatinine Ratio 31.8     Anion Gap 7.0 mmol/L     Narrative:       GFR Normal >60  Chronic Kidney Disease <60  Kidney Failure <15    Protime-INR [[id number]]  (Abnormal) Collected:   04/02/19 0614    Specimen:  Blood Updated:  04/02/19 0704     Protime 27.1 Seconds      INR 2.66    Narrative:       Therapeutic range for most indications is 2.0-3.0 INR,  or 2.5-3.5 for mechanical heart valves.    CBC & Differential [[id number]] Collected:  04/02/19 0614    Specimen:  Blood Updated:  04/02/19 0644    Narrative:       The following orders were created for panel order CBC & Differential.  Procedure                               Abnormality         Status                     ---------                               -----------         ------                     CBC Auto Differential[[id number]]        Abnormal            Final result                 Please view results for these tests on the individual orders.    CBC Auto Differential [[id number]]  (Abnormal) Collected:  04/02/19 0614    Specimen:  Blood Updated:  04/02/19 0644     WBC 10.27 10*3/mm3      RBC 2.74 10*6/mm3      Hemoglobin 7.6 g/dL      Hematocrit 24.7 %      MCV 90.1 fL      MCH 27.7 pg      MCHC 30.8 g/dL      RDW 16.6 %      RDW-SD 54.5 fl      MPV 11.1 fL      Platelets 206 10*3/mm3      Neutrophil % 83.2 %      Lymphocyte % 7.5 %      Monocyte % 6.7 %      Eosinophil % 1.9 %      Basophil % 0.3 %      Immature Grans % 0.4 %      Neutrophils, Absolute 8.54 10*3/mm3      Lymphocytes, Absolute 0.77 10*3/mm3      Monocytes, Absolute 0.69 10*3/mm3      Eosinophils, Absolute 0.20 10*3/mm3      Basophils, Absolute 0.03 10*3/mm3      Immature Grans, Absolute 0.04 10*3/mm3      nRBC 0.0 /100 WBC             I reviewed the patient's new clinical results.  I reviewed the patient's new imaging results and agree with the interpretation.  I reviewed the patient's other test results and agree with the interpretation        Assessment/Plan       Pneumonia of left lower lobe due to infectious organism (CMS/HCC)    Atrial fibrillation [I48.91]    Long-term (current) use of anticoagulants    COPD (chronic obstructive pulmonary disease) (CMS/HCC)     Hydronephrosis    Hypertension    Hyperlipidemia      Cystoscopy ureteroscopy removal stent      Holland Johnson MD  04/02/19  6:33 PM

## 2019-04-02 NOTE — SIGNIFICANT NOTE
"   04/02/19 1316   Rehab Treatment   Discipline physical therapy assistant   Reason Treatment Not Performed patient/family declined treatment  (pt stated he is \"full of laxatives\", pt requested no Tx today)     "

## 2019-04-02 NOTE — PLAN OF CARE
Problem: Patient Care Overview  Goal: Plan of Care Review  Outcome: Ongoing (interventions implemented as appropriate)   04/02/19 9778   OTHER   Outcome Summary Contact precautions maintained. Pt has been NPO today for stent removal. Will continue to monitor. Pt has not had BM since 03/26 MD aware.   Coping/Psychosocial   Plan of Care Reviewed With patient   Plan of Care Review   Progress no change     Goal: Individualization and Mutuality  Outcome: Ongoing (interventions implemented as appropriate)      Problem: Fall Risk (Adult)  Goal: Absence of Fall  Outcome: Ongoing (interventions implemented as appropriate)      Problem: Skin Injury Risk (Adult)  Goal: Skin Health and Integrity  Outcome: Ongoing (interventions implemented as appropriate)      Problem: Wound (Includes Pressure Injury) (Adult)  Goal: Signs and Symptoms of Listed Potential Problems Will be Absent, Minimized or Managed (Wound)  Outcome: Ongoing (interventions implemented as appropriate)      Problem: Pneumonia (Adult)  Goal: Signs and Symptoms of Listed Potential Problems Will be Absent, Minimized or Managed (Pneumonia)  Outcome: Ongoing (interventions implemented as appropriate)      Problem: Chronic Obstructive Pulmonary Disease (Adult)  Goal: Signs and Symptoms of Listed Potential Problems Will be Absent, Minimized or Managed (Chronic Obstructive Pulmonary Disease)  Outcome: Ongoing (interventions implemented as appropriate)

## 2019-04-02 NOTE — NURSING NOTE
Spoke with Dr. Gresham made him aware of pt Hgb and pt had his aspirin. Pt also had Pt/ INR today. No new order given.

## 2019-04-02 NOTE — PROGRESS NOTES
[facility] Medicine Services  INPATIENT PROGRESS NOTE    Length of Stay: 7  Date of Admission: 3/26/2019  Primary Care Physician: Seth Arce MD    Subjective   Chief Complaint: Constipation.  HPI:    Seth Montiel is a 83 y.o. male with medical history significant for COPD, atrial fibrillation, porcine valve replacement, hyperlipidemia, hypertension, and history of tobacco abuse presents with cough and weakness.      Patient was seen at his primary care provider's office for follow-up after discharge from skilled nursing facility.  Patient does report a productive cough with some fatigue.  Patient was noted to have left lower lobe pneumonia on chest x-ray and a supratherapeutic INR of greater than 8.  As such patient was sent to the emergency department for further evaluation.     Patient is a frail-appearing gentleman with barrel chest noted on exam.  Patient does have chronic hypoxic respiratory failure for which he uses 4 L/min nasal cannula and at time of exam is currently on 4 L/min nasal cannula.  Patient does endorse some fatigue, weakness, productive cough but denied any fever, chills, nausea, vomiting, chest pain, or diaphoresis    He is doing better today, remains deconditioned, short of air on exertion and remains on his baseline supplemental oxygen of 4 L nasal prongs and maintaining saturation in the low 90s.  He remains constipated and has not had a bowel movement in 6 days.  He continues to pass flatus.    Review of Systems   Constitutional: Positive for activity change and fatigue. Negative for appetite change, chills, diaphoresis and fever.   HENT: Negative for trouble swallowing and voice change.    Eyes: Negative for photophobia and visual disturbance.   Respiratory: Positive for cough and shortness of breath. Negative for choking, chest tightness, wheezing and stridor.    Cardiovascular: Positive for leg swelling. Negative for chest pain and  palpitations.   Gastrointestinal: Positive for constipation. Negative for abdominal distention, abdominal pain, blood in stool, diarrhea, nausea and vomiting.   Endocrine: Negative for cold intolerance, heat intolerance, polydipsia, polyphagia and polyuria.   Genitourinary: Negative for decreased urine volume, difficulty urinating, dysuria, enuresis, flank pain, frequency, hematuria and urgency.   Musculoskeletal: Negative for arthralgias, gait problem, myalgias, neck pain and neck stiffness.   Skin: Negative for pallor, rash and wound.   Neurological: Positive for weakness. Negative for dizziness, tremors, seizures, syncope, facial asymmetry, speech difficulty, light-headedness, numbness and headaches.   Hematological: Does not bruise/bleed easily.   Psychiatric/Behavioral: Negative for agitation, behavioral problems and confusion.       Objective    Temp:  [96 °F (35.6 °C)-98.7 °F (37.1 °C)] 96.1 °F (35.6 °C)  Heart Rate:  [61-85] 85  Resp:  [16-22] 22  BP: (100-115)/(52-61) 100/55    Physical Exam   Constitutional: He is oriented to person, place, and time. He appears well-developed and well-nourished. No distress.   HENT:   Head: Normocephalic and atraumatic.   Eyes: EOM are normal. Pupils are equal, round, and reactive to light. No scleral icterus.   Neck: Normal range of motion. Neck supple. No JVD present. No thyromegaly present.   Cardiovascular: Normal rate and normal heart sounds. An irregularly irregular rhythm present. Exam reveals no gallop and no friction rub.   No murmur heard.  Pulmonary/Chest: Effort normal. He has decreased breath sounds. He has no wheezes. He has rhonchi. He has no rales. He exhibits no tenderness.   Abdominal: Soft. Bowel sounds are normal. He exhibits no distension and no mass. There is no tenderness. There is no rebound and no guarding.   Musculoskeletal: He exhibits edema. He exhibits no tenderness or deformity.   Neurological: He is alert and oriented to person, place, and  time. No cranial nerve deficit. He exhibits normal muscle tone. Coordination normal.   Skin: Skin is warm and dry. No rash noted. He is not diaphoretic. No erythema. No pallor.   He has trace edema both legs with chronic venous stasis changes.   Psychiatric: He has a normal mood and affect. His behavior is normal. Judgment and thought content normal.   Nursing note and vitals reviewed.        Medication Review:    Current Facility-Administered Medications:   •  aspirin EC tablet 81 mg, 81 mg, Oral, Daily, Guy Evans MD, 81 mg at 04/02/19 1014  •  atorvastatin (LIPITOR) tablet 10 mg, 10 mg, Oral, Nightly, Guy Evans MD, 10 mg at 04/01/19 2049  •  bacitracin ointment, , Topical, Daily, Guy Evans MD  •  cefepime (MAXIPIME) 2 g/100 mL 0.9% NS (mbp), 2 g, Intravenous, Q12H, Guy Evans MD, Last Rate: 200 mL/hr at 04/02/19 0311, 2 g at 04/02/19 0311  •  dextrose (D5W) 5 % infusion, 50 mL/hr, Intravenous, Continuous, Austin Chaudhry MD, Last Rate: 50 mL/hr at 04/02/19 0604, 50 mL/hr at 04/02/19 0604  •  diltiaZEM CD (CARDIZEM CD) 24 hr capsule 240 mg, 240 mg, Oral, Nightly, Guy Evans MD, 240 mg at 04/01/19 2048  •  ferrous sulfate EC tablet 324 mg, 324 mg, Oral, BID With Meals, Guy Evans MD, 324 mg at 04/02/19 1015  •  flecainide (TAMBOCOR) tablet 50 mg, 50 mg, Oral, Q12H, Guy Evans MD, 50 mg at 04/02/19 1015  •  folic acid (FOLVITE) tablet 1 mg, 1 mg, Oral, Daily, Guy Evans MD, 1 mg at 04/02/19 1014  •  furosemide (LASIX) tablet 20 mg, 20 mg, Oral, BID, Guy Evans MD, 20 mg at 04/02/19 1014  •  ipratropium-albuterol (DUO-NEB) nebulizer solution 3 mL, 3 mL, Nebulization, 4x Daily - RT, Guy Evans MD, 3 mL at 04/02/19 1149  •  levothyroxine (SYNTHROID, LEVOTHROID) tablet 50 mcg, 50 mcg, Oral, QAM AC, Guy Evans MD, 50 mcg at 04/02/19 0638  •  magic butt ointment, , Topical, BID, Guy Evans MD  •  melatonin tablet 6 mg, 6 mg, Oral, Nightly PRN, Guy Evans MD, 6 mg  at 04/02/19 0024  •  Pharmacy to dose warfarin, , Does not apply, Continuous PRN, Guy Evans MD  •  polyethylene glycol (MIRALAX) powder 17 g, 17 g, Oral, BID, Luis M Gaston MD, 17 g at 04/02/19 1014  •  sodium chloride 0.9 % flush 10 mL, 10 mL, Intravenous, PRN, Demar Finch MD, 10 mL at 04/02/19 1015  •  sodium chloride 0.9 % flush 3 mL, 3 mL, Intravenous, Q12H, Guy Evans MD, 3 mL at 04/01/19 2049  •  sodium chloride 0.9 % flush 3-10 mL, 3-10 mL, Intravenous, PRN, Guy Evans MD, 10 mL at 03/29/19 1710  •  tamsulosin (FLOMAX) 24 hr capsule 0.4 mg, 0.4 mg, Oral, Nightly, Guy Evans MD, 0.4 mg at 04/01/19 2049  •  vitamin B-12 (CYANOCOBALAMIN) tablet 1,000 mcg, 1,000 mcg, Oral, Daily, Guy Evans MD, 1,000 mcg at 04/02/19 1015  •  warfarin (COUMADIN) tablet 1 mg, 1 mg, Oral, Daily, Guy Evans MD, 1 mg at 04/01/19 1715    Results Review:  I have reviewed the labs, radiology results, and diagnostic studies.    Laboratory Data:   Results from last 7 days   Lab Units 04/02/19  0614 04/01/19  0609 03/31/19  0600  03/26/19  1700   SODIUM mmol/L 144 149* 145   < > 147*   POTASSIUM mmol/L 3.7 3.6 4.0   < > 4.1   CHLORIDE mmol/L 98 99 94*   < > 94*   CO2 mmol/L 39.0* 39.0* 40.0*   < > 39.0*   BUN mg/dL 41* 42* 41*   < > 31*   CREATININE mg/dL 1.29* 1.32* 1.30*   < > 1.43*   GLUCOSE mg/dL 99 91 93   < > 83   CALCIUM mg/dL 9.0 9.0 8.7   < > 9.8   BILIRUBIN mg/dL  --   --   --   --  0.5   ALK PHOS U/L  --   --   --   --  103   ALT (SGPT) U/L  --   --   --   --  14   AST (SGOT) U/L  --   --   --   --  27   ANION GAP mmol/L 7.0 11.0 11.0   < > 14.0    < > = values in this interval not displayed.     Estimated Creatinine Clearance: 42.8 mL/min (A) (by C-G formula based on SCr of 1.29 mg/dL (H)).          Results from last 7 days   Lab Units 04/02/19  0614 04/01/19  0609 03/31/19  0600 03/30/19  0629 03/29/19  0602   WBC 10*3/mm3 10.27 10.40 9.66 9.65 10.62   HEMOGLOBIN g/dL 7.6* 7.5* 7.2* 7.6* 7.7*    HEMATOCRIT % 24.7* 25.1* 22.7* 25.4* 25.1*   PLATELETS 10*3/mm3 206 214 198 200 196     Results from last 7 days   Lab Units 04/02/19  0614 04/01/19  0609 03/31/19  0601 03/30/19  0629 03/29/19  0603   INR  2.66* 2.75* 3.11* 3.11* 2.60*       Culture Data:   No results found for: BLOODCX  No results found for: URINECX  Respiratory Culture   Date Value Ref Range Status   03/30/2019 Scant growth (1+) Normal Respiratory Emily  Final     No results found for: WOUNDCX  No results found for: STOOLCX  No components found for: BODYFLD    Radiology Data:   Imaging Results (last 24 hours)     ** No results found for the last 24 hours. **          I have reviewed the patient's current medications.     Assessment/Plan     Hospital Problem List:  Principal Problem:    Pneumonia of left lower lobe due to infectious organism (CMS/[facility])  Active Problems:    Atrial fibrillation [I48.91]    Long-term (current) use of anticoagulants    COPD (chronic obstructive pulmonary disease) (CMS/[facility])    Hypertension    Hyperlipidemia    -Left lower lobe pneumonia with Pseudomonas bacteremia: Patient was initially on meropenem and has been transitioned to cefepime.    Acute cystitis: Urine culture is positive for Pseudomonas.  Continue cefepime.    -Acute exacerbation of COPD: Improved as patient is back to his baseline supplemental oxygen.  Continue supplemental oxygen, bronchodilators and steroids.    -Chronic atrial fibrillation: Patient is in controlled ventricular response.  Continue rate control and anticoagulation.  INR is now therapeutic.      -History of left-sided hydronephrosis status post J stent placement: Continue Flomax and IV antibiotics.  Patient is scheduled for removal of J stent today. Urologist is following.      -History of severe aortic stenosis: Patient is on routine surveillance by his primary cardiologist.  Echocardiogram done on 3/27/2019 showed:  · Left atrial cavity size is moderate-to-severely dilated.  · Severe  aortic valve stenosis is present.  · Mild mitral valve regurgitation is present  · Mild tricuspid valve regurgitation is present.  · Estimated EF = 65%.  · Left ventricular systolic function is normal.  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    -Hypernatremia: Resolved.         -Chronic kidney disease stage III:  Patient's baseline creatinine is between 1.4-1.65.  Creatinine remains stable and is better than his baseline.  Continue to monitor.    -Anemia of chronic inflammation: Hemoglobin remains stable.  Continue iron supplementation, routine monitoring of hemoglobin and transfuse if the need arises.    -Dependent edema both legs with chronic stasis changes: Much improved with diuretics.    -Constipation: Continue bowel regimen.    Deconditioning: Continue PT and OT.    Discharge Planning: In progress.    Austin Chaudhry MD   04/02/19   11:58 AM

## 2019-04-02 NOTE — THERAPY TREATMENT NOTE
Acute Care - Occupational Therapy Treatment Note  [facility]     Patient Name: Seth Montiel  : 1935  MRN: [id number]  Today's Date: 2019  Onset of Illness/Injury or Date of Surgery: 19  Date of Referral to OT: 19  Referring Physician: ERNA Curran MD    Admit Date: 3/26/2019       ICD-10-CM ICD-9-CM   1. Pneumonia of left lower lobe due to infectious organism (CMS/HCC) J18.1 486   2. Poisoning by warfarin sodium, accidental or unintentional, initial encounter T45.511A 964.2     E858.2   3. Impaired functional mobility, balance, gait, and endurance Z74.09 V49.89   4. Impaired mobility and activities of daily living Z74.09 799.89   5. Dysphagia, unspecified type R13.10 787.20     Patient Active Problem List   Diagnosis   • Atrial fibrillation [I48.91]   • Long-term (current) use of anticoagulants   • Nonrheumatic aortic valve disorder, unspecified   • Atrial fibrillation (CMS/HCC)   • COPD (chronic obstructive pulmonary disease) (CMS/HCC)   • SOB (shortness of breath)   • Aortic valve disorder   • Hematuria   • Hydronephrosis   • Pneumonia of left lower lobe due to infectious organism (CMS/HCC)   • Hypertension   • Hyperlipidemia     Past Medical History:   Diagnosis Date   • Aortic valve disorder    • Atrial fibrillation (CMS/HCC)    • COPD (chronic obstructive pulmonary disease) (CMS/HCC)    • Hyperlipidemia    • Hypertension    • Nonrheumatic aortic valve disorder, unspecified    • SOB (shortness of breath)      Past Surgical History:   Procedure Laterality Date   • AORTIC VALVE SURGERY     • APPENDECTOMY     • CARDIAC SURGERY     • CYSTOSCOPY, URETEROSCOPY, RETROGRADE PYELOGRAM, STENT INSERTION Left 2019    Procedure: CYSTOSCOPY, LEFT RETROGRADE, URETEROSCOPY, LASER LITHOTRIPSY, STENT PLACEMENT;  Surgeon: Waucoma, Holland HOLBROOK MD;  Location: Health system;  Service: Urology   • GALLBLADDER SURGERY     • ROTATOR CUFF REPAIR         Therapy Treatment    Rehabilitation Treatment  Summary     Row Name 04/02/19 0852             Treatment Time/Intention    Discipline  occupational therapy assistant  -BB      Document Type  therapy note (daily note)  -BB      Subjective Information  no complaints  -BB      Mode of Treatment  individual therapy;occupational therapy  -BB      Patient/Family Observations  no family present  -BB      Total Minutes, Occupational Therapy Treatment  39  -BB      Therapy Frequency (OT Eval)  other (see comments) 5-7 days/wk  -BB      Patient Effort  adequate  -BB      Comment  defers EOB/OOB  -BB      Existing Precautions/Restrictions  fall;oxygen therapy device and L/min  -BB      Recorded by [BB] Munira Ferris COTA/L 04/02/19 1331      Row Name 04/02/19 0852             Vital Signs    Pretreatment Heart Rate (beats/min)  68  -BB      Posttreatment Heart Rate (beats/min)  64  -BB      Pre SpO2 (%)  92  -BB      O2 Delivery Pre Treatment  supplemental O2  -BB      Post SpO2 (%)  92  -BB      O2 Delivery Post Treatment  supplemental O2  -BB      Pre Patient Position  Supine  -BB      Post Patient Position  Supine  -BB      Recorded by [BB] Munira Ferris COTA/L 04/02/19 1331      Row Name 04/02/19 0852             Cognitive Assessment/Intervention- PT/OT    Orientation Status (Cognition)  oriented x 4  -BB      Follows Commands (Cognition)  WFL  -BB      Recorded by [BB] Munira Ferris COTA/L 04/02/19 1331      Row Name 04/02/19 0852             Grooming Assessment/Training    [place] Level (Grooming)  wash face, hands;set up;supervision;oral care regimen;hair care, combing/brushing  -BB      Grooming Position  long sitting  -BB      Recorded by [BB] Munira Ferris COTA/L 04/02/19 1331      Row Name 04/02/19 0852             Therapeutic Exercise    Upper Extremity Range of Motion (Therapeutic Exercise)  shoulder flexion/extension, bilateral;shoulder internal/external rotation, bilateral;elbow flexion/extension, bilateral;forearm  supination/pronation, bilateral;wrist flexion/extension, bilateral chest press  -BB      Hand (Therapeutic Exercise)  finger flexion/extension, bilateral  -BB      Weight/Resistance (Therapeutic Exercise)  1 pound  -BB      Exercise Type (Therapeutic Exercise)  AROM (active range of motion)  -BB      Sets/Reps (Therapeutic Exercise)  1x20  -BB      Equipment (Therapeutic Exercise)  free weight, barbell  -BB      Expected Outcome (Therapeutic Exercise)  improve functional stability;improve functional tolerance, self-care activity;improve performance, transfer skills  -BB      Recorded by [BB] Munira Ferris COTA/L 04/02/19 1331      Row Name 04/02/19 0852             Positioning and Restraints    Pre-Treatment Position  in bed  -BB      Post Treatment Position  bed  -BB      In Bed  supine;call light within reach;encouraged to call for assist;exit alarm on  -BB      Recorded by [BB] Munira Ferris COTA/L 04/02/19 1331      Row Name 04/02/19 0852             Pain Scale: Numbers Pre/Post-Treatment    Pain Scale: Numbers, Pretreatment  0/10 - no pain  -BB      Pain Scale: Numbers, Post-Treatment  0/10 - no pain  -BB      Recorded by [BB] Munira Ferris COTA/L 04/02/19 1331      Row Name                Wound 03/26/19 2220 coccyx pressure injury    Wound - Properties Group Date first assessed: 03/26/19 [CN] Time first assessed: 2220 [CN] Present On Admission : yes;picture taken [CN] Location: coccyx [CN] Type: pressure injury [CN] Stage, Pressure Injury: Stage 2 [CN] Recorded by:  [CN] Roland Simpson RN 03/26/19 2241    Row Name                Wound 03/26/19 2220 Left lower gluteal pressure injury    Wound - Properties Group Date first assessed: 03/26/19 [CN] Time first assessed: 2220 [CN] Side: Left [CN] Orientation: lower [CN] Location: gluteal [CN] Type: pressure injury [CN] Stage, Pressure Injury: Stage 2 [CN] Recorded by:  [CN] Roland Simpson RN 03/26/19 2242    Row Name                Wound  03/27/19 1420 Left lower arm skin tear    Wound - Properties Group Date first assessed: 03/27/19 [BL] Time first assessed: 1420 [BL] Present On Admission : yes [BL] Side: Left [BL] Orientation: lower [BL] Location: arm [BL] Type: skin tear [BL] Recorded by:  [BL] Chelsea Chaves RN 03/27/19 1503    Row Name 04/02/19 0852             Plan of Care Review    Plan of Care Reviewed With  patient  -BB      Recorded by [BB] Munira Ferris COTA/L 04/02/19 1331      Row Name 04/02/19 0852             Outcome Summary/Treatment Plan (OT)    Daily Summary of Progress (OT)  progress toward functional goals is gradual  -BB      Plan for Continued Treatment (OT)  continue POC  -BB      Anticipated Discharge Disposition (OT)  skilled nursing facility  -BB      Recorded by [BB] Munira Ferris COTA/L 04/02/19 1331        User Key  (r) = Recorded By, (t) = Taken By, (c) = Cosigned By    Initials Name Effective Dates Discipline    BB Munira Ferris COTA/L 03/07/18 -  OT    BL Chelsea Chaves RN 07/26/16 -  Nurse    CN Roland Simpson RN 10/17/16 -  Nurse        Wound 03/26/19 2220 coccyx pressure injury (Active)   Dressing Appearance open to air 4/2/2019  7:30 AM   Closure Open to air 4/2/2019  7:30 AM   Base red;granulating 4/2/2019  7:30 AM   Periwound redness 4/1/2019  8:48 PM   Periwound Temperature warm 4/1/2019  8:48 PM   Drainage Amount none 4/2/2019  7:30 AM   Care, Wound barrier applied 4/2/2019  7:30 AM   Periwound Care, Wound barrier ointment applied 4/1/2019  8:48 PM       Wound 03/26/19 2220 Left lower gluteal pressure injury (Active)   Closure Open to air 4/2/2019  7:30 AM   Base red;granulating 4/2/2019  7:30 AM   Periwound blanchable 4/2/2019  7:30 AM   Periwound Temperature warm 4/1/2019  8:48 PM   Drainage Amount none 4/2/2019  7:30 AM   Care, Wound barrier applied 4/2/2019  7:30 AM   Periwound Care, Wound barrier ointment applied 4/1/2019  8:48 PM       Wound 03/27/19 1420 Left lower arm  skin tear (Active)   Dressing Appearance dry 4/2/2019  7:30 AM   Closure Open to air 4/2/2019  7:30 AM   Base pink;scab 4/2/2019  7:30 AM   Edges irregular 4/1/2019  8:48 PM   Drainage Amount none 4/2/2019  7:30 AM     Rehab Goal Summary     Row Name 04/02/19 0852             Occupational Therapy Goals    Bed Mobility Goal Selection (OT)  bed mobility, OT goal 1  -BB      Transfer Goal Selection (OT)  transfer, OT goal 1  -BB      Bathing Goal Selection (OT)  bathing, OT goal 1  -BB      Dressing Goal Selection (OT)  dressing, OT goal 1  -BB         Bed Mobility Goal 1 (OT)    Activity/Assistive Device (Bed Mobility Goal 1, OT)  bed mobility activities, all  -BB      Round O Level/Cues Needed (Bed Mobility Goal 1, OT)  contact guard assist  -BB      Time Frame (Bed Mobility Goal 1, OT)  long term goal (LTG)  -BB      Progress/Outcomes (Bed Mobility Goal 1, OT)  goal not met  -BB         Transfer Goal 1 (OT)    Activity/Assistive Device (Transfer Goal 1, OT)  transfers, all  -BB      Round O Level/Cues Needed (Transfer Goal 1, OT)  supervision required  -BB      Time Frame (Transfer Goal 1, OT)  long term goal (LTG)  -BB      Progress/Outcome (Transfer Goal 1, OT)  goal not met  -BB         Bathing Goal 1 (OT)    Activity/Assistive Device (Bathing Goal 1, OT)  bathing skills, all  -BB      Round O Level/Cues Needed (Bathing Goal 1, OT)  minimum assist (75% or more patient effort)  -BB      Time Frame (Bathing Goal 1, OT)  long term goal (LTG)  -BB      Progress/Outcomes (Bathing Goal 1, OT)  goal not met  -BB         Dressing Goal 1 (OT)    Activity/Assistive Device (Dressing Goal 1, OT)  dressing skills, all  -BB      Round O/Cues Needed (Dressing Goal 1, OT)  minimum assist (75% or more patient effort)  -BB      Time Frame (Dressing Goal 1, OT)  long term goal (LTG)  -BB      Progress/Outcome (Dressing Goal 1, OT)  goal not met  -BB         Patient Education Goal (OT)    Activity (Patient  Education Goal, OT)  B UE HEP for increased independence with functional mobility and ADLs, EC/WS and home safety/fall prev.  -BB      Jud/Cues/Accuracy (Memory Goal 2, OT)  demonstrates adequately;verbalizes understanding  -BB      Time Frame (Patient Education Goal, OT)  long term goal (LTG)  -BB      Progress/Outcome (Patient Education Goal, OT)  goal not met  -BB        User Key  (r) = Recorded By, (t) = Taken By, (c) = Cosigned By    Initials Name Provider Type Discipline    Munira Holliday COTA/L Occupational Therapy Assistant OT        Occupational Therapy Education     Title: PT OT SLP Therapies (In Progress)     Topic: Occupational Therapy (In Progress)     Point: ADL training (In Progress)     Description: Instruct learner(s) on proper safety adaptation and remediation techniques during self care or transfers.   Instruct in proper use of assistive devices.    Learning Progress Summary           Patient Acceptance, E, NR by BB at 3/28/2019 10:44 AM                   Point: Home exercise program (In Progress)     Description: Instruct learner(s) on appropriate technique for monitoring, assisting and/or progressing therapeutic exercises/activities.    Learning Progress Summary           Patient Acceptance, E, NR by BB at 4/2/2019  1:32 PM    Acceptance, E, NR by BB at 4/1/2019  1:51 PM                   Point: Precautions (Done)     Description: Instruct learner(s) on prescribed precautions during self-care and functional transfers.    Learning Progress Summary           Patient Acceptance, E, VU,NR by TO at 3/30/2019  3:04 PM    Comment:  Pt educated on benefits of OOB and EOB to increase strength, improve circulation, and increase ax tolerance. pt declined this tx, but agree to do next date.    Acceptance, E, VU,NR by RB at 3/27/2019  4:21 PM    Comment:  Edu pt on use of gait belt and non skid socks when OOB and no OOB without assist.                               User Key     Initials  Effective Dates Name Provider Type Discipline    RB 06/15/16 -  Ryley Suarez OT Occupational Therapist OT    BB 03/07/18 -  Munira Ferris COTA/L Occupational Therapy Assistant OT    TO 03/07/18 -  Alexis Tobias COTA/L Occupational Therapy Assistant OT                OT Recommendation and Plan  Outcome Summary/Treatment Plan (OT)  Daily Summary of Progress (OT): progress toward functional goals is gradual  Plan for Continued Treatment (OT): continue POC  Anticipated Discharge Disposition (OT): skilled nursing facility  Therapy Frequency (OT Eval): other (see comments)(5-7 days/wk)  Daily Summary of Progress (OT): progress toward functional goals is gradual  Plan of Care Review  Plan of Care Reviewed With: patient  Plan of Care Reviewed With: patient  Outcome Summary: Pt defers EOB/OOB 2' to having procedure today. Pt performed B UE exercises with O2 sats 90-94% during activity. No goals met.  Outcome Measures     Row Name 04/02/19 0852 04/01/19 1358 04/01/19 0805       How much help from another person do you currently need...    Turning from your back to your side while in flat bed without using bedrails?  --  3  -CA  --    Moving from lying on back to sitting on the side of a flat bed without bedrails?  --  3  -CA  --    Moving to and from a bed to a chair (including a wheelchair)?  --  3  -CA  --    Standing up from a chair using your arms (e.g., wheelchair, bedside chair)?  --  3  -CA  --    Climbing 3-5 steps with a railing?  --  2  -CA  --    To walk in hospital room?  --  2  -CA  --    AM-PAC 6 Clicks Score  --  16  -CA  --       How much help from another is currently needed...    Putting on and taking off regular lower body clothing?  1  -BB  --  2  -BB    Bathing (including washing, rinsing, and drying)  2  -BB  --  2  -BB    Toileting (which includes using toilet bed pan or urinal)  2  -BB  --  2  -BB    Putting on and taking off regular upper body clothing  3  -BB  --  3  -BB    Taking care of  personal grooming (such as brushing teeth)  3  -BB  --  3  -BB    Eating meals  3  -BB  --  3  -BB    Score  14  -BB  --  15  -BB       Functional Assessment    Outcome Measure Options  --  AM-PAC 6 Clicks Basic Mobility (PT)  -CA  --    Row Name 03/31/19 1030 03/31/19 1000 03/30/19 1500       How much help from another person do you currently need...    Turning from your back to your side while in flat bed without using bedrails?  --  3  -RW  3  -RW    Moving from lying on back to sitting on the side of a flat bed without bedrails?  --  3  -RW  3  -RW    Moving to and from a bed to a chair (including a wheelchair)?  --  3  -RW  2  -RW    Standing up from a chair using your arms (e.g., wheelchair, bedside chair)?  --  3  -RW  2  -RW    Climbing 3-5 steps with a railing?  --  2  -RW  2  -RW    To walk in hospital room?  --  2  -RW  2  -RW    AM-[facility] 6 Clicks Score  --  16  -RW  14  -RW       How much help from another is currently needed...    Putting on and taking off regular lower body clothing?  2  -BL  --  --    Bathing (including washing, rinsing, and drying)  2  -BL  --  --    Toileting (which includes using toilet bed pan or urinal)  2  -BL  --  --    Putting on and taking off regular upper body clothing  3  -BL  --  --    Taking care of personal grooming (such as brushing teeth)  3  -BL  --  --    Eating meals  3  -BL  --  --    Score  15  -BL  --  --       Functional Assessment    Outcome Measure Options  [facility] (OT[facility]  -BL  --  --    Row Name 03/30/19 1420             How much help from another is currently needed...    Putting on and taking off regular lower body clothing?  2  -TO      Bathing (including washing, rinsing, and drying)  2  -TO      Toileting (which includes using toilet bed pan or urinal)  2  -TO      Putting on and taking off regular upper body clothing  3  -TO      Taking care of personal grooming (such as brushing teeth)  3  -TO      Eating meals  3  -TO      Score   15  -TO        User Key  (r) = Recorded By, (t) = Taken By, (c) = Cosigned By    Initials Name Provider Type    CA Doug Ponce, PTA Physical Therapy Assistant    RW Dustin Sanchez, PTA Physical Therapy Assistant    Munira Holliday COTA/L Occupational Therapy Assistant    Keri Lozano, SOLOMON/L Occupational Therapy Assistant    Alexis Warner, SOLOMON/L Occupational Therapy Assistant         Non-skid socks and gait belt in place. Toileting offered. Call light and needs within reach. Pt advised to not get up alone and call the nurse for assistance.  Bed alarm on.     Time Calculation:   Time Calculation- OT     Row Name 04/02/19 1334             Time Calculation- OT    OT Start Time  0852  -BB      OT Stop Time  0931  -BB      OT Time Calculation (min)  39 min  -BB      Total Timed Code Minutes- OT  39 minute(s)  -BB      OT Received On  04/02/19  -        User Key  (r) = Recorded By, (t) = Taken By, (c) = Cosigned By    Initials Name Provider Type    Munira Holliday COTA/L Occupational Therapy Assistant        Therapy Charges for Today     Code Description Service Date Service Provider Modifiers Qty    07907197969 HC OT SELF CARE/MGMT/TRAIN EA 15 MIN 4/1/2019 Munira Ferris COTA/L GO 1    41044398436 HC OT THER PROC EA 15 MIN 4/1/2019 Munira Ferris COTA/L GO 2    10934776436 HC OT SELF CARE/MGMT/TRAIN EA 15 MIN 4/2/2019 Munira Ferris COTA/L GO 1    96050952223 HC OT THER PROC EA 15 MIN 4/2/2019 Munira Ferris COTA/L GO 2               JUANITO Smith/RANDELL  4/2/2019

## 2019-04-02 NOTE — PLAN OF CARE
Problem: Patient Care Overview  Goal: Plan of Care Review  Outcome: Ongoing (interventions implemented as appropriate)   04/02/19 5664   OTHER   Outcome Summary Pt defers EOB/OOB 2' to having procedure today. Pt performed B UE exercises with O2 sats 90-94% during activity. No goals met.   Coping/Psychosocial   Plan of Care Reviewed With patient   Plan of Care Review   Progress no change

## 2019-04-02 NOTE — PROGRESS NOTES
"Anticoagulation by Pharmacy - Warfarin    Seth Montiel is a 83 y.o.male  [Ht: 175.3 cm (69\"); Wt: 68.5 kg (151 lb)] on Warfarin 1 mg for indication of atrial fibrillation.    Goal INR: 2-3  Home regimen: 4mg daily except 2mg on Tues/Fri       Today's INR:   Lab Results   Component Value Date    INR 2.66 (H) 04/02/2019         Lab Results   Component Value Date    INR 2.66 (H) 04/02/2019    INR 2.75 (H) 04/01/2019    INR 3.11 (H) 03/31/2019    PROTIME 27.1 (H) 04/02/2019    PROTIME 27.8 (H) 04/01/2019    PROTIME 30.5 (H) 03/31/2019     Lab Results   Component Value Date    HGB 7.6 (L) 04/02/2019    HGB 7.5 (L) 04/01/2019    HGB 7.2 (L) 03/31/2019     Lab Results   Component Value Date    HCT 24.7 (L) 04/02/2019    HCT 25.1 (L) 04/01/2019    HCT 22.7 (L) 03/31/2019     Lab Results   Component Value Date     04/02/2019     04/01/2019     03/31/2019       Recent Warfarin Administrations       The 5 most recent administrations since 03/26/2019 are shown below each listed medication.    Warfarin Sodium         Order Dose Date Given     warfarin (COUMADIN) tablet 1 mg 1 mg 04/01/2019      1 mg 03/31/2019     warfarin (COUMADIN) tablet 1 mg 1 mg 03/29/2019     warfarin (COUMADIN) tablet 2.5 mg 2.5 mg 03/28/2019      2.5 mg 03/27/2019                      Assessment/Plan:  Reviewed above labs. INR is 2.66.  INR is at goal.   H/H remains low, but stable.  Interacting medications include aspirin and levothyroxine.   Admitted with elevated INR of 7.75 3/26.  Received 10 mg Vitamin K 3/26.  Patient did receive dose of warfarin last night.    Will continue current dose of 1 mg.   Rx will continue to follow and adjust dose accordingly.        Agnieszka Tinajero [facility]  04/02/19 1:18 PM       "

## 2019-04-02 NOTE — PLAN OF CARE
Problem: Patient Care Overview  Goal: Plan of Care Review  Outcome: Ongoing (interventions implemented as appropriate)   04/02/19 0138   OTHER   Outcome Summary Pt appears to be resting well after taking melatonin; will continue to monitor.    Coping/Psychosocial   Plan of Care Reviewed With patient   Plan of Care Review   Progress no change       Problem: Fall Risk (Adult)  Goal: Absence of Fall  Outcome: Ongoing (interventions implemented as appropriate)      Problem: Skin Injury Risk (Adult)  Goal: Skin Health and Integrity  Outcome: Ongoing (interventions implemented as appropriate)      Problem: Wound (Includes Pressure Injury) (Adult)  Goal: Signs and Symptoms of Listed Potential Problems Will be Absent, Minimized or Managed (Wound)  Outcome: Ongoing (interventions implemented as appropriate)      Problem: Pneumonia (Adult)  Goal: Signs and Symptoms of Listed Potential Problems Will be Absent, Minimized or Managed (Pneumonia)  Outcome: Ongoing (interventions implemented as appropriate)      Problem: Chronic Obstructive Pulmonary Disease (Adult)  Goal: Signs and Symptoms of Listed Potential Problems Will be Absent, Minimized or Managed (Chronic Obstructive Pulmonary Disease)  Outcome: Ongoing (interventions implemented as appropriate)

## 2019-04-03 NOTE — THERAPY TREATMENT NOTE
Acute Care - Physical Therapy Treatment Note  [facility]     Patient Name: Seth Montiel  : 1935  MRN: [id number]  Today's Date: 4/3/2019  Onset of Illness/Injury or Date of Surgery: 19  Date of Referral to PT: 19  Referring Physician: ERNA Curran MD    Admit Date: 3/26/2019    Visit Dx:    ICD-10-CM ICD-9-CM   1. Pneumonia of left lower lobe due to infectious organism (CMS/HCC) J18.1 486   2. Poisoning by warfarin sodium, accidental or unintentional, initial encounter T45.511A 964.2     E858.2   3. Impaired functional mobility, balance, gait, and endurance Z74.09 V49.89   4. Impaired mobility and activities of daily living Z74.09 799.89   5. Dysphagia, unspecified type R13.10 787.20   6. Hydronephrosis N13.30 591     Patient Active Problem List   Diagnosis   • Atrial fibrillation [I48.91]   • Long-term (current) use of anticoagulants   • Nonrheumatic aortic valve disorder, unspecified   • Atrial fibrillation (CMS/HCC)   • COPD (chronic obstructive pulmonary disease) (CMS/HCC)   • SOB (shortness of breath)   • Aortic valve disorder   • Hematuria   • Hydronephrosis   • Pneumonia of left lower lobe due to infectious organism (CMS/HCC)   • Hypertension   • Hyperlipidemia       Therapy Treatment    Rehabilitation Treatment Summary     Row Name 19 1450 19 0953 19 0855       Treatment Time/Intention    Discipline  physical therapy assistant  -RW  physical therapy assistant  -RW  occupational therapy assistant  -BB    Document Type  therapy note (daily note)  -RW  therapy note (daily note)  -RW  therapy note (daily note)  -BB    Subjective Information  no complaints  -RW  --  no complaints  -BB    Mode of Treatment  physical therapy  -RW  physical therapy  -RW  individual therapy;occupational therapy  -BB    Patient/Family Observations  pt calm and cooperative  -RW  pt in bed seen after MD rounding. pt is confused about why he is here and what is happening.  -RW  no  family present  -BB    Total Minutes, Physical Therapy Treatment  20  -RW  24  -RW2  --    Total Minutes, Occupational Therapy Treatment  --  --  15  -BB    Therapy Frequency (OT Eval)  --  --  other (see comments) 5-7 days/wk  -BB    Patient Effort  adequate  -RW  poor  -RW  poor  -BB    Comment  head ct showed no changes  -RW  called RN into room to help with pts concerns. pt still not quite satisfied with nurses attempt at reorienting pt to why he is here.  -RW2  defers EOB/OOB  -BB    Existing Precautions/Restrictions  fall  -RW  --  fall;oxygen therapy device and L/min  -BB    Treatment Considerations/Comments  pt would not get oob this visit. ''tomorrow I will''  -RW  --  --    Patient Response to Treatment  --  no treatment accomplished  -RW2  --    Recorded by [RW] Dustin Sanchez, PTA 04/03/19 1617 [RW] Dustin Sanchez, PTA 04/03/19 1115  [RW2] Dustin Sanchez, PTA 04/03/19 1256 [BB] Munira Ferris SOLOMON/L 04/03/19 1147    Row Name 04/03/19 1450 04/03/19 0855          Vital Signs    Pre Systolic BP Rehab  120  -RW  --     Pre Treatment Diastolic BP  86  -RW  --     Pretreatment Heart Rate (beats/min)  100  -RW  78  -BB     Posttreatment Heart Rate (beats/min)  97  -RW  --     Pre SpO2 (%)  98  -RW  90  -BB     O2 Delivery Pre Treatment  hi-flow  -RW  supplemental O2  -BB     Post SpO2 (%)  97  -RW  --     O2 Delivery Post Treatment  hi-flow  -RW  --     Pre Patient Position  --  Supine  -BB     Recorded by [RW] Dutsin Sanchez, PTA 04/03/19 1617 [BB] Munira Ferris SOLOMON/L 04/03/19 1147     Row Name 04/03/19 1450 04/03/19 0953 04/03/19 0855       Cognitive Assessment/Intervention- PT/OT    Orientation Status (Cognition)  oriented x 4  -RW  oriented x 4  -RW  oriented to;person  -BB    Follows Commands (Cognition)  WFL  -RW  WFL  -RW  --    Recorded by [RW] Dustin Sanchez, PTA 04/03/19 1617 [RW] Dustin Sanchez, PTA 04/03/19 1115 [BB] Munira Ferris, SOLOMON/L 04/03/19 1147    Row Name 04/03/19  1450             Bed Mobility Assessment/Treatment    Comment (Bed Mobility)  pt defers  -RW      Recorded by [RW] Dustin Sanchez, PTA 04/03/19 1617      Row Name 04/03/19 1450             Transfer Assessment/Treatment    Comment (Transfers)  pt defers  -RW      Recorded by [RW] Dustin Sanchez, PTA 04/03/19 1617      Row Name 04/03/19 0855             Grooming Assessment/Training    [place] Level (Grooming)  hair care, combing/brushing;oral care regimen;wash face, hands;set up;verbal cues;contact guard assist  -BB      Grooming Position  long sitting  -BB      Recorded by [BB] Munira Ferris COTA/L 04/03/19 1147      Row Name 04/03/19 1450             Therapeutic Exercise    Lower Extremity (Therapeutic Exercise)  heel slides, bilateral;SAQ (short arc quad), bilateral  -RW      Lower Extremity Range of Motion (Therapeutic Exercise)  ankle dorsiflexion/plantar flexion, bilateral;hip abduction/adduction, bilateral  -RW      Exercise Type (Therapeutic Exercise)  AROM (active range of motion);AAROM (active assistive range of motion)  -RW      Position (Therapeutic Exercise)  supine  -RW      Sets/Reps (Therapeutic Exercise)  15  -RW      Recorded by [RW] Dustin Sanchez, PTA 04/03/19 1617      Row Name 04/03/19 1450 04/03/19 0855          Positioning and Restraints    Pre-Treatment Position  in bed  -RW  in bed  -BB     Post Treatment Position  bed  -RW  bed  -BB     In Bed  --  supine;call light within reach;encouraged to call for assist;exit alarm on  -BB     Recorded by [RW] Dustin Sanchez, PTA 04/03/19 1617 [BB] Munira Ferris COTA/L 04/03/19 1147     Row Name 04/03/19 1450 04/03/19 0953 04/03/19 0855       Pain Scale: Numbers Pre/Post-Treatment    Pain Scale: Numbers, Pretreatment  0/10 - no pain  -RW  0/10 - no pain  -RW  0/10 - no pain  -BB    Pain Scale: Numbers, Post-Treatment  0/10 - no pain  -RW  0/10 - no pain  -RW  0/10 - no pain  -BB    Recorded by [RW] Dustin Sanchez, PTA 04/03/19  1617 [RW] Dustin Sanchez, PTA 04/03/19 1115 [BB] Munira Ferris COTA/L 04/03/19 1147    Row Name                Wound 03/26/19 2220 coccyx pressure injury    Wound - Properties Group Date first assessed: 03/26/19 [CN] Time first assessed: 2220 [CN] Present On Admission : yes;picture taken [CN] Location: coccyx [CN] Type: pressure injury [CN] Stage, Pressure Injury: Stage 2 [CN] Recorded by:  [CN] Roland Simpson, RN 03/26/19 2241    Row Name                Wound 03/26/19 2220 Left lower gluteal pressure injury    Wound - Properties Group Date first assessed: 03/26/19 [CN] Time first assessed: 2220 [CN] Side: Left [CN] Orientation: lower [CN] Location: gluteal [CN] Type: pressure injury [CN] Stage, Pressure Injury: Stage 2 [CN] Recorded by:  [CN] Roland Simpson RN 03/26/19 2242    Row Name                Wound 03/27/19 1420 Left lower arm skin tear    Wound - Properties Group Date first assessed: 03/27/19 [BL] Time first assessed: 1420 [BL] Present On Admission : yes [BL] Side: Left [BL] Orientation: lower [BL] Location: arm [BL] Type: skin tear [BL] Recorded by:  [BL] Chelsea Chaves RN 03/27/19 1503    Row Name 04/03/19 0855             Plan of Care Review    Plan of Care Reviewed With  patient  -BB      Recorded by [BB] Munira Ferris COTA/L 04/03/19 1147      Row Name 04/03/19 0855             Outcome Summary/Treatment Plan (OT)    Daily Summary of Progress (OT)  progress toward functional goals is gradual  -BB      Plan for Continued Treatment (OT)  continue POC  -BB      Anticipated Discharge Disposition (OT)  skilled nursing facility  -BB      Recorded by [BB] Munira Ferris COTA/L 04/03/19 1147      Row Name 04/03/19 1450             Outcome Summary/Treatment Plan (PT)    Daily Summary of Progress (PT)  progress toward functional goals is gradual  -RW      Barriers to Overall Progress (PT)  motivation  -RW      Plan for Continued Treatment (PT)  oob  -RW      Recorded by [RW] Daniel  Dustin HOLBROOK, PTA 04/03/19 1617        User Key  (r) = Recorded By, (t) = Taken By, (c) = Cosigned By    Initials Name Effective Dates Discipline    RW Dustin Sanchez, PTA 03/07/18 -  PT    BB Munira Ferris, SOLOMON/L 03/07/18 -  OT    Chelsea Escalera RN 07/26/16 -  Nurse    CN Roland Simpson RN 10/17/16 -  Nurse          Wound 03/26/19 2220 coccyx pressure injury (Active)   Dressing Appearance open to air 4/2/2019  8:30 PM   Closure Open to air 4/2/2019  8:30 PM   Base red;granulating 4/2/2019  8:30 PM   Care, Wound barrier applied 4/2/2019  8:30 PM       Wound 03/26/19 2220 Left lower gluteal pressure injury (Active)   Closure Open to air 4/2/2019  8:30 PM   Base red;granulating 4/2/2019  8:30 PM   Care, Wound barrier applied 4/2/2019  8:30 PM       Wound 03/27/19 1420 Left lower arm skin tear (Active)   Dressing Appearance dry 4/2/2019  8:30 PM   Closure Open to air 4/2/2019  8:30 PM   Base pink;scab 4/2/2019  8:30 PM       Rehab Goal Summary     Row Name 04/03/19 1617 04/03/19 0855          Physical Therapy Goals    Bed Mobility Goal Selection (PT)  bed mobility, PT goal 1  -RW  --     Transfer Goal Selection (PT)  transfer, PT goal 1  -RW  --     Gait Training Goal Selection (PT)  gait training, PT goal 1  -RW  --     Strength Goal Selection (PT)  strength, PT goal 1  -RW  --        Bed Mobility Goal 1 (PT)    Activity/Assistive Device (Bed Mobility Goal 1, PT)  bed mobility activities, all  -RW  --     [place] Level/Cues Needed (Bed Mobility Goal 1, PT)  standby assist  -RW  --     Time Frame (Bed Mobility Goal 1, PT)  4 days  -RW  --     Barriers (Bed Mobility Goal 1, PT)  SOB, cognition  -RW  --     Progress/Outcomes (Bed Mobility Goal 1, PT)  goal not met  -RW  --        Transfer Goal 1 (PT)    Activity/Assistive Device (Transfer Goal 1, PT)  sit-to-stand/stand-to-sit;bed-to-chair/chair-to-bed;walker, rolling  -RW  --     [place] Level/Cues Needed (Transfer Goal 1, PT)  standby assist  -RW   --     Time Frame (Transfer Goal 1, PT)  4 days  -RW  --     Barriers (Transfers Goal 1, PT)  SOB, cognition  -RW  --     Progress/Outcome (Transfer Goal 1, PT)  goal not met  -RW  --        Gait Training Goal 1 (PT)    Activity/Assistive Device (Gait Training Goal 1, PT)  walker, rolling;assistive device use;increase endurance/gait distance;increase energy conservation;decrease fall risk  -RW  --     [place] Level (Gait Training Goal 1, PT)  contact guard assist;verbal cues required  -RW  --     Distance (Gait Goal 1, PT)  40 feet  -RW  --     Time Frame (Gait Training Goal 1, PT)  5 days  -RW  --     Barriers (Gait Training Goal 1, PT)  SOB, cognition  -RW  --     Progress/Outcome (Gait Training Goal 1, PT)  goal not met  -RW  --        Strength Goal 1 (PT)    Strength Goal 1 (PT)  Patient will be able to complete 20 reps of at least 5 seated LE exercises in order to demo improved strength for transfers and ambulation  -RW  --     Time Frame (Strength Goal 1, PT)  4 days  -RW  --     Barriers (Strength Goal 1, PT)  SOB, cognition  -RW  --     Progress/Outcome (Strength Goal 1, PT)  goal not met  -RW  --        Occupational Therapy Goals    Bed Mobility Goal Selection (OT)  --  bed mobility, OT goal 1  -BB     Transfer Goal Selection (OT)  --  transfer, OT goal 1  -BB     Bathing Goal Selection (OT)  --  bathing, OT goal 1  -BB     Dressing Goal Selection (OT)  --  dressing, OT goal 1  -BB        Bed Mobility Goal 1 (OT)    Activity/Assistive Device (Bed Mobility Goal 1, OT)  --  bed mobility activities, all  -BB     [place] Level/Cues Needed (Bed Mobility Goal 1, OT)  --  contact guard assist  -BB     Time Frame (Bed Mobility Goal 1, OT)  --  long term goal (LTG)  -BB     Progress/Outcomes (Bed Mobility Goal 1, OT)  --  goal not met  -BB        Transfer Goal 1 (OT)    Activity/Assistive Device (Transfer Goal 1, OT)  --  transfers, all  -BB     [place] Level/Cues Needed (Transfer Goal 1, OT)  --   supervision required  -BB     Time Frame (Transfer Goal 1, OT)  --  long term goal (LTG)  -BB     Progress/Outcome (Transfer Goal 1, OT)  --  goal not met  -BB        Bathing Goal 1 (OT)    Activity/Assistive Device (Bathing Goal 1, OT)  --  bathing skills, all  -BB     [place] Level/Cues Needed (Bathing Goal 1, OT)  --  minimum assist (75% or more patient effort)  -BB     Time Frame (Bathing Goal 1, OT)  --  long term goal (LTG)  -BB     Progress/Outcomes (Bathing Goal 1, OT)  --  goal not met  -BB        Dressing Goal 1 (OT)    Activity/Assistive Device (Dressing Goal 1, OT)  --  dressing skills, all  -BB     [place]/Cues Needed (Dressing Goal 1, OT)  --  minimum assist (75% or more patient effort)  -BB     Time Frame (Dressing Goal 1, OT)  --  long term goal (LTG)  -BB     Progress/Outcome (Dressing Goal 1, OT)  --  goal not met  -BB        Patient Education Goal (OT)    Activity (Patient Education Goal, OT)  --  B UE HEP for increased independence with functional mobility and ADLs, EC/WS and home safety/fall prev.  -BB     [place]/Cues/Accuracy (Memory Goal 2, OT)  --  demonstrates adequately;verbalizes understanding  -BB     Time Frame (Patient Education Goal, OT)  --  long term goal (LTG)  -BB     Progress/Outcome (Patient Education Goal, OT)  --  goal not met  -BB       User Key  (r) = Recorded By, (t) = Taken By, (c) = Cosigned By    Initials Name Provider Type Discipline    RW Dustin Sanchez, PTA Physical Therapy Assistant PT    BB Munira Ferris, SOLOMON/L Occupational Therapy Assistant OT          Physical Therapy Education     Title: PT OT SLP Therapies (In Progress)     Topic: Physical Therapy (In Progress)     Point: Mobility training (In Progress)     Learning Progress Summary           Patient Acceptance, E, NR by CHRISTA at 3/27/2019  3:35 PM    Comment:  Role of PT, PT POC, transfer technique                   Point: Precautions (In Progress)     Learning Progress Summary            Patient Acceptance, E, NR by CHRISTA at 3/27/2019  3:36 PM    Comment:  gait belt, call light, staff assistance with mobility                               User Key     Initials Effective Dates Name Provider Type Discipline     07/23/18 -  Debbie Galeana, PT Physical Therapist PT                PT Recommendation and Plan     Outcome Summary/Treatment Plan (PT)  Daily Summary of Progress (PT): progress toward functional goals is gradual  Barriers to Overall Progress (PT): motivation  Plan for Continued Treatment (PT): oob  Plan of Care Reviewed With: patient  Progress: no change  Outcome Summary: attempted to see pt in am and his cognitive state was altered. was somewhat paranoid and was unsure why he was here. nurse reported his family requested tests , and ct of head was ordered but read as no evidence of changes. afternoon session found him calm and cooperative but not wanting to get oob. he did agree to bed therex. cont with oob as able.  Outcome Measures     Row Name 04/03/19 1600 04/03/19 0855 04/02/19 0852       How much help from another person do you currently need...    Turning from your back to your side while in flat bed without using bedrails?  3  -RW  --  --    Moving from lying on back to sitting on the side of a flat bed without bedrails?  3  -RW  --  --    Moving to and from a bed to a chair (including a wheelchair)?  2  -RW  --  --    Standing up from a chair using your arms (e.g., wheelchair, bedside chair)?  2  -RW  --  --    Climbing 3-5 steps with a railing?  2  -RW  --  --    To walk in hospital room?  2  -RW  --  --    AM-PAC 6 Clicks Score  14  -RW  --  --       How much help from another is currently needed...    Putting on and taking off regular lower body clothing?  --  1  -BB  1  -BB    Bathing (including washing, rinsing, and drying)  --  2  -BB  2  -BB    Toileting (which includes using toilet bed pan or urinal)  --  2  -BB  2  -BB    Putting on and taking off regular upper body clothing   --  3  -BB  3  -BB    Taking care of personal grooming (such as brushing teeth)  --  3  -BB  3  -BB    Eating meals  --  3  -BB  3  -BB    Score  --  14  -BB  14  -BB    Row Name 04/01/19 1358 04/01/19 0805          How much help from another person do you currently need...    Turning from your back to your side while in flat bed without using bedrails?  3  -CA  --     Moving from lying on back to sitting on the side of a flat bed without bedrails?  3  -CA  --     Moving to and from a bed to a chair (including a wheelchair)?  3  -CA  --     Standing up from a chair using your arms (e.g., wheelchair, bedside chair)?  3  -CA  --     Climbing 3-5 steps with a railing?  2  -CA  --     To walk in hospital room?  2  -CA  --     AM-PAC 6 Clicks Score  16  -CA  --        How much help from another is currently needed...    Putting on and taking off regular lower body clothing?  --  2  -BB     Bathing (including washing, rinsing, and drying)  --  2  -BB     Toileting (which includes using toilet bed pan or urinal)  --  2  -BB     Putting on and taking off regular upper body clothing  --  3  -BB     Taking care of personal grooming (such as brushing teeth)  --  3  -BB     Eating meals  --  3  -BB     Score  --  15  -BB        Functional Assessment    Outcome Measure Options  AM-PAC 6 Clicks Basic Mobility (PT)  -CA  --       User Key  (r) = Recorded By, (t) = Taken By, (c) = Cosigned By    Initials Name Provider Type    CA Doug Ponce, PTA Physical Therapy Assistant    Dustin Mason, MAK Physical Therapy Assistant    BB Munira Ferris, SOLOMON/L Occupational Therapy Assistant         Time Calculation:   PT Charges     Row Name 04/03/19 1621 04/03/19 1257          Time Calculation    Start Time  1450  -RW  0953  -RW     Stop Time  1510  -RW  1017  -RW     Time Calculation (min)  20 min  -RW  24 min  -RW        Time Calculation- PT    Total Timed Code Minutes- PT  20 minute(s)  -RW  24 minute(s)  -RW       User Key   (r) = Recorded By, (t) = Taken By, (c) = Cosigned By    Initials Name Provider Type     Dustin Sanchez PTA Physical Therapy Assistant        Therapy Charges for Today     Code Description Service Date Service Provider Modifiers Qty    82365321379 HC PT THER SUPP EA 15 MIN 4/3/2019 Dustin Sanchez PTA GP 2    09244597738 HC PT THER PROC EA 15 MIN 4/3/2019 Dustin Sanchez PTA GP 1          PT G-Codes  Outcome Measure Options: AM-PAC 6 Clicks Basic Mobility (PT)  AM-PAC 6 Clicks Score: 14  Score: 14    Dustin Sanchez PTA  4/3/2019

## 2019-04-03 NOTE — OP NOTE
CYSTOSCOPY URETEROSCOPY RETROGRADE PYELOGRAM HOLMIUM LASER STENT INSERTION  Procedure Note    Seth Montiel  3/26/2019 - 4/2/2019    Pre-op Diagnosis:   Hydronephrosis [N13.30]    Post-op Diagnosis:     Post-Op Diagnosis Codes:     * Hydronephrosis [N13.30]      Procedure(s):  CYSTOSCOPY, LEFT RETROGRADE,  URETEROSCOPY,  STENT  REMOVAL    Surgeon(s):  Holland Johnson MD    Anesthesia: General    Staff:   Circulator: Elisabeth Melton RN; Itzel Izquierdo RN  Scrub Person: Winter Rucker; Mateo De La Rosa  Assistant: Gladys Ordoñez CSA    Estimated Blood Loss: none    Specimens:                ID Type Source Tests Collected by Time   1 : old stint Calculus Ureter, Left STONE ANALYSIS Holland Johnson MD 4/2/2019 1918         Drains:      Findings: Double-J stent retained    Complications: None    Indications: Same    Description of Procedure: Brought to cystoscopy placed in the dorsolithotomy position.  After a sterile prep and drape the genitalia I passed a 22 [place] cystoscope in the bladder the double-J stent was pulled down and then put 038 guidewire up the stent into the renal pelvis shot a retrograde studies with a retrograde catheter.  I left the guidewire up alongside the guidewire with the ureteroscope found that the ureter was patent without stones or obstruction from the UVJ to the UPJ.  Scope was removed patient taken recovery at home she did well    Holland Johnson MD     Date: 4/2/2019  Time: 7:23 PM

## 2019-04-03 NOTE — PLAN OF CARE
Problem: Patient Care Overview  Goal: Plan of Care Review  Outcome: Ongoing (interventions implemented as appropriate)   04/03/19 7733   OTHER   Outcome Summary attempted to see pt in am and his cognitive state was altered. was somewhat paranoid and was unsure why he was here. nurse reported his family requested tests , and ct of head was ordered but read as no evidence of changes. afternoon session found him calm and cooperative but not wanting to get oob. he did agree to bed therex. cont with oob as able.   Coping/Psychosocial   Plan of Care Reviewed With patient   Plan of Care Review   Progress no change

## 2019-04-03 NOTE — PLAN OF CARE
Problem: Patient Care Overview  Goal: Plan of Care Review  Outcome: Ongoing (interventions implemented as appropriate)   04/03/19 0153   OTHER   Outcome Summary Pt continues to be anxious; will continue to monitor.    Coping/Psychosocial   Plan of Care Reviewed With patient   Plan of Care Review   Progress no change       Problem: Fall Risk (Adult)  Goal: Absence of Fall  Outcome: Ongoing (interventions implemented as appropriate)      Problem: Skin Injury Risk (Adult)  Goal: Skin Health and Integrity  Outcome: Ongoing (interventions implemented as appropriate)      Problem: Wound (Includes Pressure Injury) (Adult)  Goal: Signs and Symptoms of Listed Potential Problems Will be Absent, Minimized or Managed (Wound)  Outcome: Ongoing (interventions implemented as appropriate)      Problem: Pneumonia (Adult)  Goal: Signs and Symptoms of Listed Potential Problems Will be Absent, Minimized or Managed (Pneumonia)  Outcome: Ongoing (interventions implemented as appropriate)      Problem: Chronic Obstructive Pulmonary Disease (Adult)  Goal: Signs and Symptoms of Listed Potential Problems Will be Absent, Minimized or Managed (Chronic Obstructive Pulmonary Disease)  Outcome: Ongoing (interventions implemented as appropriate)

## 2019-04-03 NOTE — CONSULTS
Adult Nutrition  Assessment    Patient Name:  Seth Motniel  YOB: 1935  MRN: [id number]  Admit Date:  3/26/2019    Assessment Date:  4/3/2019    Comments:  Pt is awake and was able to answer some questions; however, did not seem like interested in talking. He stated he liked his Boost Plus; mentioned that appetite was good; however, the recorded breakfast intake was 0%. The meds and labs were reviewed. Will continue monitor pt's weights, and PO intakes. RD to follow up.  Reason for Assessment     Row Name 04/03/19 1330          Reason for Assessment    Reason For Assessment  follow-up protocol  (Pended)      Diagnosis  fluid status  (Pended)      Identified At Risk by Screening Criteria  no indicators present  (Pended)          Nutrition/Diet History     Row Name 04/03/19 1332          Nutrition/Diet History    Typical Food/Fluid Intake  Pt is awake and was able to answer some questions; however, did not seem like interested in talking. He stated he liked his Boost Plus; mentioned that appetite was good  (Pended)      Supplemental Drinks/Foods/Additives  Boost Plus  (Pended)            Labs/Tests/Procedures/Meds     Row Name 04/03/19 1346          Labs/Procedures/Meds    Lab Results Reviewed  reviewed, pertinent  (Pended)      Lab Results Comments  BUN 44; Creatinine 1.43; glucose 96  (Pended)         Medications    Pertinent Medications Reviewed  reviewed  (Pended)            Estimated/Assessed Needs     Row Name 04/03/19 1346          Calculation Measurements    Weight Used For Calculations  67.5 kg (148 lb 13 oz)  (Pended)         Estimated/Assessed Needs    Additional Documentation  KCAL/KG (Group);Fluid Requirements (Group)  (Pended)         Calorie Requirements    Weight Used For Calorie Calculations  67.5 kg (148 lb 13 oz)  (Pended)      Estimated Calorie Requirement (kcal/day)  2000  (Pended)      Estimated Calorie Need Method  kcal/kg  (Pended)         KCAL/KG    14 Kcal/Kg (kcal)   945  (Pended)      15 Kcal/Kg (kcal)  1012.5  (Pended)      18 Kcal/Kg (kcal)  1215  (Pended)      20 Kcal/Kg (kcal)  1350  (Pended)      25 Kcal/Kg (kcal)  1687.5  (Pended)      30 Kcal/Kg (kcal)  2025  (Pended)      35 Kcal/Kg (kcal)  2362.5  (Pended)      40 Kcal/Kg (kcal)  2700  (Pended)      45 Kcal/Kg (kcal)  3037.5  (Pended)      50 Kcal/Kg (kcal)  3375  (Pended)      kcal/kg (Specify)  2000  (Pended)         [place]-St. Jeor Equation    RMR ([place]-St. Jeor Equation)  1365.38  (Pended)         Protein Requirements    Weight Used For Protein Calculations  67.5 kg (148 lb 13 oz)  (Pended)      Est Protein Requirement Amount (gms/kg)  0.8 gm protein  (Pended)      Estimated Protein Requirements (gms/day)  54  (Pended)         Fluid Requirements    Estimated Fluid Requirements (mL/day)  2000  (Pended)      RDA Method (mL)  2000  (Pended)      [place] Method (over 20 kg)  2850  (Pended)          Nutrition Prescription Ordered     Row Name 04/03/19 1353          Nutrition Prescription PO    Current PO Diet  Regular  (Pended)      Common Modifiers  Cardiac;Fluid Restriction;Low Sodium  (Pended)      Fluid Restriction mL per Day  Other (comment)  (Pended)  2000     Low Sodium Details  1,500 mg Sodium  (Pended)          Evaluation of Received Nutrient/Fluid Intake     Row Name 04/03/19 1346          Calculation Measurements    Weight Used For Calculations  67.5 kg (148 lb 13 oz)  (Pended)          Evaluation of Prescribed Nutrient/Fluid Intake     Row Name 04/03/19 1346          Calculation Measurements    Weight Used For Calculations  67.5 kg (148 lb 13 oz)  (Pended)              Electronically signed by:  Marta Carvalho  04/03/19 1:58 PM

## 2019-04-03 NOTE — ANESTHESIA POSTPROCEDURE EVALUATION
Patient: Seth Montiel    Procedure Summary     Date:  04/02/19 Room / Location:  [facility] OR 02 / [facility] OR    Anesthesia Start:  1847 Anesthesia Stop:  1931    Procedure:  CYSTOSCOPY, LEFT RETROGRADE,  URETEROSCOPY,  STENT  REMOVAL (Left ) Diagnosis:       Hydronephrosis      (Hydronephrosis [N13.30])    Surgeon:  Holland Johnson MD Provider:  Tyrone Burkett CRNA    Anesthesia Type:  MAC ASA Status:  4          Anesthesia Type: MAC  Last vitals  BP   112/56 (04/02/19 1732)   Temp   98 °F (36.7 °C) (04/02/19 1732)   Pulse   62 (04/02/19 1910)   Resp   24 (04/02/19 1732)     SpO2   91 % (04/02/19 1732)     Post Anesthesia Care and Evaluation    Patient location during evaluation: PHASE II  Patient participation: complete - patient participated  Level of consciousness: awake and alert  Pain management: adequate  Airway patency: patent  Anesthetic complications: No anesthetic complications  PONV Status: none  Cardiovascular status: acceptable  Respiratory status: acceptable  Hydration status: acceptable

## 2019-04-03 NOTE — THERAPY TREATMENT NOTE
Acute Care - Occupational Therapy Treatment Note  [facility]     Patient Name: Seth Montiel  : 1935  MRN: [id number]  Today's Date: 4/3/2019  Onset of Illness/Injury or Date of Surgery: 19  Date of Referral to OT: 19  Referring Physician: ERNA Curran MD    Admit Date: 3/26/2019       ICD-10-CM ICD-9-CM   1. Pneumonia of left lower lobe due to infectious organism (CMS/HCC) J18.1 486   2. Poisoning by warfarin sodium, accidental or unintentional, initial encounter T45.511A 964.2     E858.2   3. Impaired functional mobility, balance, gait, and endurance Z74.09 V49.89   4. Impaired mobility and activities of daily living Z74.09 799.89   5. Dysphagia, unspecified type R13.10 787.20   6. Hydronephrosis N13.30 591     Patient Active Problem List   Diagnosis   • Atrial fibrillation [I48.91]   • Long-term (current) use of anticoagulants   • Nonrheumatic aortic valve disorder, unspecified   • Atrial fibrillation (CMS/HCC)   • COPD (chronic obstructive pulmonary disease) (CMS/HCC)   • SOB (shortness of breath)   • Aortic valve disorder   • Hematuria   • Hydronephrosis   • Pneumonia of left lower lobe due to infectious organism (CMS/HCC)   • Hypertension   • Hyperlipidemia     Past Medical History:   Diagnosis Date   • Aortic valve disorder    • Atrial fibrillation (CMS/HCC)    • COPD (chronic obstructive pulmonary disease) (CMS/HCC)    • Hyperlipidemia    • Hypertension    • Nonrheumatic aortic valve disorder, unspecified    • SOB (shortness of breath)      Past Surgical History:   Procedure Laterality Date   • AORTIC VALVE SURGERY     • APPENDECTOMY     • CARDIAC SURGERY     • CYSTOSCOPY, URETEROSCOPY, RETROGRADE PYELOGRAM, STENT INSERTION Left 2019    Procedure: CYSTOSCOPY, LEFT RETROGRADE, URETEROSCOPY, LASER LITHOTRIPSY, STENT PLACEMENT;  Surgeon: Holland Johnson MD;  Location: [facility];  Service: Urology   • GALLBLADDER SURGERY     • ROTATOR CUFF REPAIR         Therapy  Treatment    Rehabilitation Treatment Summary     Row Name 04/03/19 0953 04/03/19 0855          Treatment Time/Intention    Discipline  physical therapy assistant  -RW  occupational therapy assistant  -BB     Document Type  therapy note (daily note)  -RW  therapy note (daily note)  -BB     Subjective Information  --  no complaints  -BB     Mode of Treatment  physical therapy  -RW  individual therapy;occupational therapy  -BB     Patient/Family Observations  pt in bed seen after MD singer. pt is confused about why he is here and what is happening.  -RW  no family present  -BB     Total Minutes, Occupational Therapy Treatment  --  15  -BB     Therapy Frequency (OT Eval)  --  other (see comments) 5-7 days/wk  -BB     Patient Effort  poor  -RW  poor  -BB     Comment  --  defers EOB/OOB  -BB     Existing Precautions/Restrictions  --  fall;oxygen therapy device and L/min  -BB     Recorded by [RW] Dustin Sanchez, [facility] 04/03/19 1115 [BB] Munira Ferris COTA/L 04/03/19 1147     Row Name 04/03/19 0855             Vital Signs    Pretreatment Heart Rate (beats/min)  78  -BB      Pre SpO2 (%)  90  -BB      O2 Delivery Pre Treatment  supplemental O2  -BB      Pre Patient Position  Supine  -BB      Recorded by [BB] Munira Ferris COTA/L 04/03/19 1147      Row Name 04/03/19 0953 04/03/19 0855          Cognitive Assessment/Intervention- PT/OT    Orientation Status (Cognition)  oriented x 4  -RW  oriented to;person  -BB     Follows Commands (Cognition)  WFL  -RW  --     Recorded by [RW] Dustin Sanchez, PTA 04/03/19 1115 [BB] Munira Ferris COTA/L 04/03/19 1147     Row Name 04/03/19 0855             Grooming Assessment/Training    [place] Level (Grooming)  hair care, combing/brushing;oral care regimen;wash face, hands;set up;verbal cues;contact guard assist  -BB      Grooming Position  long sitting  -BB      Recorded by [BB] Munira Ferris COTA/L 04/03/19 1147      Row Name 04/03/19 0855              Positioning and Restraints    Pre-Treatment Position  in bed  -BB      Post Treatment Position  bed  -BB      In Bed  supine;call light within reach;encouraged to call for assist;exit alarm on  -BB      Recorded by [BB] Munira Ferris COTA/L 04/03/19 1147      Row Name 04/03/19 0953 04/03/19 0855          Pain Scale: Numbers Pre/Post-Treatment    Pain Scale: Numbers, Pretreatment  0/10 - no pain  -RW  0/10 - no pain  -BB     Pain Scale: Numbers, Post-Treatment  0/10 - no pain  -RW  0/10 - no pain  -BB     Recorded by [RW] Dustin Sanchez PTA 04/03/19 1115 [BB] Munira Ferris COTA/L 04/03/19 1147     Row Name                Wound 03/26/19 2220 coccyx pressure injury    Wound - Properties Group Date first assessed: 03/26/19 [CN] Time first assessed: 2220 [CN] Present On Admission : yes;picture taken [CN] Location: coccyx [CN] Type: pressure injury [CN] Stage, Pressure Injury: Stage 2 [CN] Recorded by:  [CN] Roland Simpson RN 03/26/19 2241    Row Name                Wound 03/26/19 2220 Left lower gluteal pressure injury    Wound - Properties Group Date first assessed: 03/26/19 [CN] Time first assessed: 2220 [CN] Side: Left [CN] Orientation: lower [CN] Location: gluteal [CN] Type: pressure injury [CN] Stage, Pressure Injury: Stage 2 [CN] Recorded by:  [CN] Roland Simpson RN 03/26/19 2242    Row Name                Wound 03/27/19 1420 Left lower arm skin tear    Wound - Properties Group Date first assessed: 03/27/19 [BL] Time first assessed: 1420 [BL] Present On Admission : yes [BL] Side: Left [BL] Orientation: lower [BL] Location: arm [BL] Type: skin tear [BL] Recorded by:  [BL] Chelsea Chaves RN 03/27/19 1503    Row Name 04/03/19 0855             Plan of Care Review    Plan of Care Reviewed With  patient  -BB      Recorded by [BB] Munira Ferris COTA/L 04/03/19 1147      Row Name 04/03/19 0855             Outcome Summary/Treatment Plan (OT)    Daily Summary of Progress (OT)  progress toward  functional goals is gradual  -BB      Plan for Continued Treatment (OT)  continue POC  -BB      Anticipated Discharge Disposition (OT)  skilled nursing facility  -BB      Recorded by [BB] Munira Ferris COTA/RANDELL 04/03/19 1147        User Key  (r) = Recorded By, (t) = Taken By, (c) = Cosigned By    Initials Name Effective Dates Discipline    RW Dustin Sanchez, PTA 03/07/18 -  PT    BB Munira Ferris COTA/L 03/07/18 -  OT    BL Chelsea Chaves RN 07/26/16 -  Nurse    CN Roland Simpson RN 10/17/16 -  Nurse        Wound 03/26/19 2220 coccyx pressure injury (Active)   Dressing Appearance open to air 4/2/2019  8:30 PM   Closure Open to air 4/2/2019  8:30 PM   Base red;granulating 4/2/2019  8:30 PM   Care, Wound barrier applied 4/2/2019  8:30 PM       Wound 03/26/19 2220 Left lower gluteal pressure injury (Active)   Closure Open to air 4/2/2019  8:30 PM   Base red;granulating 4/2/2019  8:30 PM   Care, Wound barrier applied 4/2/2019  8:30 PM       Wound 03/27/19 1420 Left lower arm skin tear (Active)   Dressing Appearance dry 4/2/2019  8:30 PM   Closure Open to air 4/2/2019  8:30 PM   Base pink;scab 4/2/2019  8:30 PM     Rehab Goal Summary     Row Name 04/03/19 0855             Occupational Therapy Goals    Bed Mobility Goal Selection (OT)  bed mobility, OT goal 1  -BB      Transfer Goal Selection (OT)  transfer, OT goal 1  -BB      Bathing Goal Selection (OT)  bathing, OT goal 1  -BB      Dressing Goal Selection (OT)  dressing, OT goal 1  -BB         Bed Mobility Goal 1 (OT)    Activity/Assistive Device (Bed Mobility Goal 1, OT)  bed mobility activities, all  -BB      [place] Level/Cues Needed (Bed Mobility Goal 1, OT)  contact guard assist  -BB      Time Frame (Bed Mobility Goal 1, OT)  long term goal (LTG)  -BB      Progress/Outcomes (Bed Mobility Goal 1, OT)  goal not met  -BB         Transfer Goal 1 (OT)    Activity/Assistive Device (Transfer Goal 1, OT)  transfers, all  -BB      [place]  Level/Cues Needed (Transfer Goal 1, OT)  supervision required  -BB      Time Frame (Transfer Goal 1, OT)  long term goal (LTG)  -BB      Progress/Outcome (Transfer Goal 1, OT)  goal not met  -BB         Bathing Goal 1 (OT)    Activity/Assistive Device (Bathing Goal 1, OT)  bathing skills, all  -BB      [place] Level/Cues Needed (Bathing Goal 1, OT)  minimum assist (75% or more patient effort)  -BB      Time Frame (Bathing Goal 1, OT)  long term goal (LTG)  -BB      Progress/Outcomes (Bathing Goal 1, OT)  goal not met  -BB         Dressing Goal 1 (OT)    Activity/Assistive Device (Dressing Goal 1, OT)  dressing skills, all  -BB      [place]/Cues Needed (Dressing Goal 1, OT)  minimum assist (75% or more patient effort)  -BB      Time Frame (Dressing Goal 1, OT)  long term goal (LTG)  -BB      Progress/Outcome (Dressing Goal 1, OT)  goal not met  -BB         Patient Education Goal (OT)    Activity (Patient Education Goal, OT)  B UE HEP for increased independence with functional mobility and ADLs, EC/WS and home safety/fall prev.  -BB      [place]/Cues/Accuracy (Memory Goal 2, OT)  demonstrates adequately;verbalizes understanding  -BB      Time Frame (Patient Education Goal, OT)  long term goal (LTG)  -BB      Progress/Outcome (Patient Education Goal, OT)  goal not met  -BB        User Key  (r) = Recorded By, (t) = Taken By, (c) = Cosigned By    Initials Name Provider Type Discipline    Munira Holliday, SOLOMON/L Occupational Therapy Assistant OT        Occupational Therapy Education     Title: PT OT SLP Therapies (In Progress)     Topic: Occupational Therapy (In Progress)     Point: ADL training (In Progress)     Description: Instruct learner(s) on proper safety adaptation and remediation techniques during self care or transfers.   Instruct in proper use of assistive devices.    Learning Progress Summary           Patient Acceptance, E, NR by ALMA ROSA at 3/28/2019 10:44 AM                   Point: Home  exercise program (In Progress)     Description: Instruct learner(s) on appropriate technique for monitoring, assisting and/or progressing therapeutic exercises/activities.    Learning Progress Summary           Patient Acceptance, E, NR by BB at 4/2/2019  1:32 PM    Acceptance, E, NR by BB at 4/1/2019  1:51 PM                   Point: Precautions (Done)     Description: Instruct learner(s) on prescribed precautions during self-care and functional transfers.    Learning Progress Summary           Patient Acceptance, E, VU,NR by TO at 3/30/2019  3:04 PM    Comment:  Pt educated on benefits of OOB and EOB to increase strength, improve circulation, and increase ax tolerance. pt declined this tx, but agree to do next date.    Acceptance, E, VU,NR by RB at 3/27/2019  4:21 PM    Comment:  Edu pt on use of gait belt and non skid socks when OOB and no OOB without assist.                               User Key     Initials Effective Dates Name Provider Type Discipline    GERONIMO 06/15/16 -  Ryley Suarez OT Occupational Therapist OT    BB 03/07/18 -  Munira Ferris COTA/L Occupational Therapy Assistant OT    TO 03/07/18 -  Alexis Tobias COTA/L Occupational Therapy Assistant OT            Non-skid socks and gait belt in place. Toileting offered. Call light and needs within reach. Pt advised to not get up alone and call the nurse for assistance.  Bed alarm on.       OT Recommendation and Plan  Outcome Summary/Treatment Plan (OT)  Daily Summary of Progress (OT): progress toward functional goals is gradual  Plan for Continued Treatment (OT): continue POC  Anticipated Discharge Disposition (OT): skilled nursing facility  Therapy Frequency (OT Eval): other (see comments)(5-7 days/wk)  Daily Summary of Progress (OT): progress toward functional goals is gradual  Plan of Care Review  Plan of Care Reviewed With: patient  Plan of Care Reviewed With: patient  Outcome Summary: Pt with increased confusion. Pt performed grooming task  with set up and supervision.Pt will need 24/7 care at time of D/C.  Outcome Measures     Row Name 04/03/19 0855 04/02/19 0852 04/01/19 1358       How much help from another person do you currently need...    Turning from your back to your side while in flat bed without using bedrails?  --  --  3  -CA    Moving from lying on back to sitting on the side of a flat bed without bedrails?  --  --  3  -CA    Moving to and from a bed to a chair (including a wheelchair)?  --  --  3  -CA    Standing up from a chair using your arms (e.g., wheelchair, bedside chair)?  --  --  3  -CA    Climbing 3-5 steps with a railing?  --  --  2  -CA    To walk in hospital room?  --  --  2  -CA    AM-[facility] 6 Clicks Score  --  --  16  -CA       How much help from another is currently needed...    Putting on and taking off regular lower body clothing?  1  -BB  1  -BB  --    Bathing (including washing, rinsing, and drying)  2  -BB  2  -BB  --    Toileting (which includes using toilet bed pan or urinal)  2  -BB  2  -BB  --    Putting on and taking off regular upper body clothing  3  -BB  3  -BB  --    Taking care of personal grooming (such as brushing teeth)  3  -BB  3  -BB  --    Eating meals  3  -BB  3  -BB  --    Score  14  -BB  14  -BB  --       Functional Assessment    Outcome Measure Options  --  --  AM-PAC 6 Clicks Basic Mobility (PT)  -CA    Row Name 04/01/19 0805             How much help from another is currently needed...    Putting on and taking off regular lower body clothing?  2  -BB      Bathing (including washing, rinsing, and drying)  2  -BB      Toileting (which includes using toilet bed pan or urinal)  2  -BB      Putting on and taking off regular upper body clothing  3  -BB      Taking care of personal grooming (such as brushing teeth)  3  -BB      Eating meals  3  -BB      Score  15  -BB        User Key  (r) = Recorded By, (t) = Taken By, (c) = Cosigned By    Initials Name Provider Type    Doug Pate, PTA Physical Therapy  Assistant    Munira Holliday COTA/L Occupational Therapy Assistant           Time Calculation:   Time Calculation- OT     Row Name 04/03/19 1149             Time Calculation- OT    OT Start Time  0855  -BB      OT Stop Time  0910  -BB      OT Time Calculation (min)  15 min  -ALMA ROSA      Total Timed Code Minutes- OT  15 minute(s)  -ALMA ROSA      OT Received On  04/03/19  -ALMA ROSA        User Key  (r) = Recorded By, (t) = Taken By, (c) = Cosigned By    Initials Name Provider Type    Munira Holldiay COTA/L Occupational Therapy Assistant        Therapy Charges for Today     Code Description Service Date Service Provider Modifiers Qty    09434236599 HC OT SELF CARE/MGMT/TRAIN EA 15 MIN 4/2/2019 Munira Ferris COTA/L GO 1    08067180322 HC OT THER PROC EA 15 MIN 4/2/2019 Munira Ferris COTA/L GO 2    25764488489 HC OT SELF CARE/MGMT/TRAIN EA 15 MIN 4/3/2019 Munira Ferris COTA/L GO 1               AUDI Smith  4/3/2019

## 2019-04-03 NOTE — PROGRESS NOTES
"   LOS: 8 days   Patient Care Team:  Seth Arce MD as PCP - General  [place], LIZET Douglas as PCP - Claims Attributed    Subjective     Subjective:  Symptoms:  He reports malaise, cough and weakness.  No shortness of breath.  (Gross hematuria no clots since early this morning; had urinary retention post op and Arita anchored early. No pain. ).    Diet:  No nausea or vomiting.    Activity level: Impaired due to weakness.    Pain:  He reports no pain.        History taken from: patient chart RN    Objective     Vital Signs  Temp:  [96 °F (35.6 °C)-98 °F (36.7 °C)] 96.7 °F (35.9 °C)  Heart Rate:  [] 111  Resp:  [18-24] 18  BP: (108-146)/(55-75) 142/66    Objective:  General Appearance:  In no acute distress.    Vital signs: (most recent): Blood pressure 142/66, pulse 111, temperature 96.7 °F (35.9 °C), temperature source Oral, resp. rate 18, height 175.3 cm (69\"), weight 67.5 kg (148 lb 14.4 oz), SpO2 92 %.  Vital signs are normal.  No fever.    Output: Producing urine (Arita gross hematuria no clots).    HEENT: Normal HEENT exam.    Lungs:  Normal effort and normal respiratory rate.  There are decreased breath sounds.    Heart: Normal rate.  Regular rhythm.  S1 normal and S2 normal.  Positive for murmur.    Chest: Symmetric chest wall expansion.   Abdomen: Abdomen is soft and non-distended.  Bowel sounds are normal.   There is no abdominal tenderness.     Extremities: Normal range of motion.    Pulses: Distal pulses are intact.    Neurological: Patient is alert and oriented to person, place and time.  GCS score is 15.    Pupils:  Pupils are equal, round, and reactive to light.    Skin:  Warm, dry and pale.  No rash, ecchymosis or cyanosis.             Results Review:    Lab Results (last 24 hours)     Procedure Component Value Units Date/Time    Hemoglobin & Hematocrit, Blood [[id number]]  (Abnormal) Collected:  04/03/19 1519    Specimen:  Blood Updated:  04/03/19 1546     Hemoglobin 7.4 g/dL      " Hematocrit 24.5 %     Protime-INR [[id number]]  (Abnormal) Collected:  04/03/19 0547    Specimen:  Blood Updated:  04/03/19 0733     Protime 28.5 Seconds      INR 2.84    Narrative:       Therapeutic range for most indications is 2.0-3.0 INR,  or 2.5-3.5 for mechanical heart valves.    Basic Metabolic Panel [[id number]]  (Abnormal) Collected:  04/03/19 0547    Specimen:  Blood Updated:  04/03/19 0707     Glucose 96 mg/dL      BUN 44 mg/dL      Creatinine 1.43 mg/dL      Sodium 145 mmol/L      Potassium 4.1 mmol/L      Chloride 98 mmol/L      CO2 40.0 mmol/L      Calcium 9.1 mg/dL      eGFR Non African Amer 47 mL/min/1.73      BUN/Creatinine Ratio 30.8     Anion Gap 7.0 mmol/L     Narrative:       GFR Normal >60  Chronic Kidney Disease <60  Kidney Failure <15    CBC & Differential [[id number]] Collected:  04/03/19 0547    Specimen:  Blood Updated:  04/03/19 0703    Narrative:       The following orders were created for panel order CBC & Differential.  Procedure                               Abnormality         Status                     ---------                               -----------         ------                     CBC Auto Differential[[id number]]        Abnormal            Final result                 Please view results for these tests on the individual orders.    CBC Auto Differential [[id number]]  (Abnormal) Collected:  04/03/19 0547    Specimen:  Blood Updated:  04/03/19 0703     WBC 14.44 10*3/mm3      RBC 2.63 10*6/mm3      Hemoglobin 7.3 g/dL      Hematocrit 23.9 %      MCV 90.9 fL      MCH 27.8 pg      MCHC 30.5 g/dL      RDW 16.4 %      RDW-SD 53.5 fl      MPV 10.8 fL      Platelets 211 10*3/mm3      Neutrophil % 91.1 %      Lymphocyte % 2.3 %      Monocyte % 5.7 %      Eosinophil % 0.1 %      Basophil % 0.1 %      Immature Grans % 0.7 %      Neutrophils, Absolute 13.15 10*3/mm3      Lymphocytes, Absolute 0.33 10*3/mm3      Monocytes, Absolute 0.82 10*3/mm3      Eosinophils, Absolute 0.02 10*3/mm3       Basophils, Absolute 0.02 10*3/mm3      Immature Grans, Absolute 0.10 10*3/mm3      nRBC 0.0 /100 WBC     Tissue Pathology Exam [[id number]] Collected:  04/02/19 2043    Specimen:  Tissue from Ureter, Left Updated:  04/03/19 0649         Imaging Results (last 24 hours)     Procedure Component Value Units Date/Time    CT Head Without Contrast [[id number]] Collected:  04/03/19 1317     Updated:  04/03/19 1337    Narrative:       Noncontrast CT examination of the brain.    INDICATION: Alteration mental status. Confusion, delirium.    Technique: Axial 5 mm contiguous images with brain parenchymal  and bone windows    This exam was performed according to our departmental  dose-optimization program, which includes automated exposure  control, adjustment of the mA and/or kV according to patient size  and/or use of iterative reconstruction technique.    Prior relevant examination: CT brain October 27, 2017.    .  Involutional, atrophic changes. 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  Brain parenchyma appears otherwise within normal limits.  Ventricles are within normal limits in size. No evidence of  abnormal mass or calcification is seen. No evidence of acute  hemorrhage is noted. Bony structures appear within normal limits  and the mastoid air cells and visualized paranasal sinuses are  normally aerated.        Impression:       CONCLUSION:   Involutional, atrophic changes. Calcified dural plaque anterior  aspect of falx cerebri. CT brain without contrast is otherwise  unremarkable. No acute changes are present.       Electronically signed by:  Chano Samuels MD  4/3/2019 1:35 PM CDT  Workstation: [id number]    FL Retrograde Pyelogram In OR [[id number]] Resulted:  04/03/19 0757     Updated:  04/03/19 0757           I reviewed the patient's new clinical results.  I reviewed the patient's new imaging results and agree with the interpretation.  I reviewed the patient's other  test results and agree with the interpretation      Assessment/Plan       Pneumonia of left lower lobe due to infectious organism (CMS/HCC)    Atrial fibrillation [I48.91]    Long-term (current) use of anticoagulants    COPD (chronic obstructive pulmonary disease) (CMS/HCC)    Hydronephrosis    Hypertension    Hyperlipidemia      Assessment & Plan    1. Bacteremia  2. UTI cystitis   3. History of left hydronephrosis with J-stent in place  4. Gross hematuria no clots post op/post Arita placement  -2/28/19 cystoscopy left ureteroscopy (findings: ), J-stent placed--pathology is negative for neoplasm  -4/2/19 cystoscopy and left J-stent removal  -Arita anchored 4/3/19 due to retention    -WBC  15.52-->11.96-->10.62-->9.65-->9.66-->10.40-->10.27-->14.44  -Estimated Creatinine Clearance: 38 mL/min (A) (by C-G formula based on SCr of 1.43 mg/dL (H)).   -Cr 1.46-->1.51-->1.33-->1.30-->1.32-->1.29-->1.43  -INR 2.84  -3/26/19 urine and blood cultures positive for Pseudomonas aeruginosa  -Cefepime day #8  -Flomax      Plan:   Continue Flomax and antibiotic  Continue Arita, manually irrigate every 1-2 hours for hematuria, nursing to notify Urology if clot hematuria or retention occurs so CBI can be initiated.     LIZET Walters  04/03/19  4:01 PM

## 2019-04-03 NOTE — PROGRESS NOTES
[facility]  INPATIENT PROGRESS NOTE    Length of Stay: 8  Date of Admission: 3/26/2019  Primary Care Physician: Seth Arce MD    Subjective   Chief Complaint: Gross hematuria.  HPI:    Seth Montiel is a 83 y.o. male with medical history significant for COPD, atrial fibrillation, porcine valve replacement, hyperlipidemia, hypertension, and history of tobacco abuse presents with cough and weakness.      Patient was seen at his primary care provider's office for follow-up after discharge from skilled nursing facility.  Patient does report a productive cough with some fatigue.  Patient was noted to have left lower lobe pneumonia on chest x-ray and a supratherapeutic INR of greater than 8.  As such patient was sent to the emergency department for further evaluation.     Patient is a frail-appearing gentleman with barrel chest noted on exam.  Patient does have chronic hypoxic respiratory failure for which he uses 4 L/min nasal cannula and at time of exam is currently on 4 L/min nasal cannula.  Patient does endorse some fatigue, weakness, productive cough but denied any fever, chills, nausea, vomiting, chest pain, or diaphoresis    He is status post removal of J stent and currently has gross hematuria and receiving bladder  irrigation every 1-2 hours.  He remains deconditioned, short of air on exertion and remains on his baseline supplemental oxygen of 4 L nasal prongs and maintaining saturation in the low 90s.  Constipation has resolved  And patient now has loose stool.      Review of Systems   Constitutional: Positive for activity change and fatigue. Negative for appetite change, chills, diaphoresis and fever.   HENT: Negative for trouble swallowing and voice change.    Eyes: Negative for photophobia and visual disturbance.   Respiratory: Positive for cough and shortness of breath. Negative for choking, chest tightness, wheezing and stridor.     Cardiovascular: Positive for leg swelling. Negative for chest pain and palpitations.   Gastrointestinal: Positive for constipation. Negative for abdominal distention, abdominal pain, blood in stool, diarrhea, nausea and vomiting.   Endocrine: Negative for cold intolerance, heat intolerance, polydipsia, polyphagia and polyuria.   Genitourinary: Positive for hematuria. Negative for decreased urine volume, difficulty urinating, dysuria, enuresis, flank pain, frequency and urgency.   Musculoskeletal: Negative for arthralgias, gait problem, myalgias, neck pain and neck stiffness.   Skin: Negative for pallor, rash and wound.   Neurological: Positive for weakness. Negative for dizziness, tremors, seizures, syncope, facial asymmetry, speech difficulty, light-headedness, numbness and headaches.   Hematological: Does not bruise/bleed easily.   Psychiatric/Behavioral: Negative for agitation, behavioral problems and confusion.       Objective    Temp:  [96 °F (35.6 °C)-98 °F (36.7 °C)] 96.7 °F (35.9 °C)  Heart Rate:  [] 100  Resp:  [16-24] 18  BP: (106-146)/(55-75) 108/61    Physical Exam   Constitutional: He is oriented to person, place, and time. He appears well-developed and well-nourished. No distress.   HENT:   Head: Normocephalic and atraumatic.   Eyes: EOM are normal. Pupils are equal, round, and reactive to light. No scleral icterus.   Neck: Normal range of motion. Neck supple. No JVD present. No thyromegaly present.   Cardiovascular: Normal rate and normal heart sounds. An irregularly irregular rhythm present. Exam reveals no gallop and no friction rub.   No murmur heard.  Pulmonary/Chest: Effort normal. He has decreased breath sounds. He has no wheezes. He has rhonchi. He has no rales. He exhibits no tenderness.   Abdominal: Soft. Bowel sounds are normal. He exhibits no distension and no mass. There is no tenderness. There is no rebound and no guarding.   Genitourinary:   Genitourinary Comments: Arita catheter  is in place with gross hematuria.   Musculoskeletal: He exhibits edema. He exhibits no tenderness or deformity.   Neurological: He is alert and oriented to person, place, and time. No cranial nerve deficit. He exhibits normal muscle tone. Coordination normal.   Skin: Skin is warm and dry. No rash noted. He is not diaphoretic. No erythema. No pallor.   He has trace edema both legs with chronic venous stasis changes.   Psychiatric: He has a normal mood and affect. His behavior is normal. Judgment and thought content normal.   Nursing note and vitals reviewed.        Medication Review:    Current Facility-Administered Medications:   •  albuterol (PROVENTIL) nebulizer solution 0.083% 2.5 mg/3mL, 2.5 mg, Nebulization, Q6H PRN, IdeZina MD, 2.5 mg at 04/03/19 0428  •  aspirin EC tablet 81 mg, 81 mg, Oral, Daily, Guy Evans MD, 81 mg at 04/03/19 0915  •  atorvastatin (LIPITOR) tablet 10 mg, 10 mg, Oral, Nightly, Guy Evans MD, 10 mg at 04/02/19 2042  •  bacitracin ointment, , Topical, Daily, Guy Evans MD  •  bisacodyl (DULCOLAX) suppository 10 mg, 10 mg, Rectal, Daily PRN, Austin Chaudhry MD, 10 mg at 04/02/19 1458  •  cefepime (MAXIPIME) 2 g/100 mL 0.9% NS (mbp), 2 g, Intravenous, Q12H, Guy Evans MD, Last Rate: 200 mL/hr at 04/03/19 0301, 2 g at 04/03/19 0301  •  dextrose (D5W) 5 % infusion, 50 mL/hr, Intravenous, Continuous, Austin Chaudhry MD, Last Rate: 50 mL/hr at 04/02/19 2129, 50 mL/hr at 04/02/19 2129  •  diltiaZEM CD (CARDIZEM CD) 24 hr capsule 240 mg, 240 mg, Oral, Nightly, Guy Evans MD, Stopped at 04/02/19 2235  •  ferrous sulfate EC tablet 324 mg, 324 mg, Oral, BID With Meals, Guy Evans MD, 324 mg at 04/03/19 0915  •  flecainide (TAMBOCOR) tablet 50 mg, 50 mg, Oral, Q12H, Guy Evans MD, 50 mg at 04/03/19 0916  •  folic acid (FOLVITE) tablet 1 mg, 1 mg, Oral, Daily, Guy Evans MD, 1 mg at 04/03/19 0916  •  furosemide (LASIX) tablet 20 mg, 20 mg, Oral, BID,  Guy Evans MD, 20 mg at 04/03/19 0916  •  ipratropium-albuterol (DUO-NEB) nebulizer solution 3 mL, 3 mL, Nebulization, 4x Daily - RT, Guy Evans MD, 3 mL at 04/03/19 0732  •  levothyroxine (SYNTHROID, LEVOTHROID) tablet 50 mcg, 50 mcg, Oral, QAM AC, Guy Evans MD, 50 mcg at 04/03/19 0915  •  lidocaine (XYLOCAINE) 2 % jelly, , , PRN, [place], Holland HOLBROOK MD, 10 mL at 04/02/19 1901  •  magic butt ointment, , Topical, BID, Guy Evans MD  •  melatonin tablet 6 mg, 6 mg, Oral, Nightly PRN, Guy Evans MD, 6 mg at 04/02/19 0024  •  Pharmacy to dose warfarin, , Does not apply, Continuous PRN, Guy Evans MD  •  sodium chloride 0.9 % flush 10 mL, 10 mL, Intravenous, PRN, Demar Finch MD, 10 mL at 04/02/19 1015  •  sodium chloride 0.9 % flush 3 mL, 3 mL, Intravenous, Q12H, Guy Evans MD, 3 mL at 04/02/19 2045  •  sodium chloride 0.9 % flush 3-10 mL, 3-10 mL, Intravenous, PRN, Guy Evans MD, 10 mL at 03/29/19 1710  •  tamsulosin (FLOMAX) 24 hr capsule 0.4 mg, 0.4 mg, Oral, Nightly, Guy Evans MD, 0.4 mg at 04/02/19 2044  •  vitamin B-12 (CYANOCOBALAMIN) tablet 1,000 mcg, 1,000 mcg, Oral, Daily, Guy Evans MD, 1,000 mcg at 04/03/19 0915  •  warfarin (COUMADIN) tablet 1 mg, 1 mg, Oral, Daily, Guy Evans MD, 1 mg at 04/02/19 2042    Results Review:  I have reviewed the labs, radiology results, and diagnostic studies.    Laboratory Data:   Results from last 7 days   Lab Units 04/03/19  0547 04/02/19  0614 04/01/19  0609   SODIUM mmol/L 145 144 149*   POTASSIUM mmol/L 4.1 3.7 3.6   CHLORIDE mmol/L 98 98 99   CO2 mmol/L 40.0* 39.0* 39.0*   BUN mg/dL 44* 41* 42*   CREATININE mg/dL 1.43* 1.29* 1.32*   GLUCOSE mg/dL 96 99 91   CALCIUM mg/dL 9.1 9.0 9.0   ANION GAP mmol/L 7.0 7.0 11.0     Estimated Creatinine Clearance: 38 mL/min (A) (by C-G formula based on SCr of 1.43 mg/dL (H)).          Results from last 7 days   Lab Units 04/03/19  0547 04/02/19 0614 04/01/19  0609 03/31/19  0600  03/30/19  0629   WBC 10*3/mm3 14.44* 10.27 10.40 9.66 9.65   HEMOGLOBIN g/dL 7.3* 7.6* 7.5* 7.2* 7.6*   HEMATOCRIT % 23.9* 24.7* 25.1* 22.7* 25.4*   PLATELETS 10*3/mm3 211 206 214 198 200     Results from last 7 days   Lab Units 04/03/19  0547 04/02/19  0614 04/01/19  0609 03/31/19  0601 03/30/19  0629   INR  2.84* 2.66* 2.75* 3.11* 3.11*       Culture Data:   No results found for: BLOODCX  No results found for: URINECX  No results found for: RESPCX  No results found for: WOUNDCX  No results found for: STOOLCX  No components found for: BODYFLD    Radiology Data:   Imaging Results (last 24 hours)     Procedure Component Value Units Date/Time    FL Retrograde Pyelogram In OR [[id number]] Resulted:  04/03/19 0757     Updated:  04/03/19 0757          I have reviewed the patient's current medications.     Assessment/Plan     Hospital Problem List:  Principal Problem:    Pneumonia of left lower lobe due to infectious organism (CMS/[facility])  Active Problems:    Atrial fibrillation [I48.91]    Long-term (current) use of anticoagulants    COPD (chronic obstructive pulmonary disease) (CMS/[facility])    Hypertension    Hyperlipidemia    -Left lower lobe pneumonia with Pseudomonas bacteremia: Patient was initially on meropenem and has been transitioned to cefepime.    Acute cystitis: Urine culture is positive for Pseudomonas.  Continue cefepime.    -Acute exacerbation of COPD: Improved as patient is back to his baseline supplemental oxygen.  Continue supplemental oxygen, bronchodilators and steroids.    -Chronic atrial fibrillation: Patient is in controlled ventricular response.  Continue rate control.  Anticoagulation is on hold for now due to gross hematuria.       -History of left-sided hydronephrosis status post J stent placement: Continue Flomax.  Had removal of J stent on 4/2/2019.  He has had gross hematuria  since after the procedure and receiving scheduled bladder irrigation. Urologist is following.      -History of severe aortic  stenosis: Patient is on routine surveillance by his primary cardiologist.  Echocardiogram done on 3/27/2019 showed:  · Left atrial cavity size is moderate-to-severely dilated.  · Severe aortic valve stenosis is present.  · Mild mitral valve regurgitation is present  · Mild tricuspid valve regurgitation is present.  · Estimated EF = 65%.  · Left ventricular systolic function is normal.  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    -Hypernatremia: Resolved.         -Chronic kidney disease stage III:  Patient's baseline creatinine is between 1.4-1.65.  Creatinine remains stable and is at his baseline.  Continue to monitor.    -Anemia of chronic inflammation: Hemoglobin remains stable.  Continue iron supplementation, routine monitoring of hemoglobin and transfuse if the need arises.    -Dependent edema both legs with chronic stasis changes: Much improved with diuretics.    -Constipation: Resolved.  Discontinue bowel regimen secondary to loose stool.     Deconditioning: Continue PT and OT.    Discharge Planning: In progress.    Austin Chaudhry MD   04/03/19   11:55 AM

## 2019-04-03 NOTE — NURSING NOTE
Called Dr. Johnson to make him aware of patient passing clots and only dribbling small amounts of bright red urine since 2300. Received order from Dr. Johnson to anchor a mckeon using a 16 Fr coude catheter.

## 2019-04-03 NOTE — PLAN OF CARE
Problem: Patient Care Overview  Goal: Plan of Care Review  Outcome: Ongoing (interventions implemented as appropriate)   04/03/19 1147   OTHER   Outcome Summary Pt with increased confusion. Pt performed grooming task with set up and supervision.Pt will need 24/7 care at time of D/C.   Coping/Psychosocial   Plan of Care Reviewed With patient   Plan of Care Review   Progress no change

## 2019-04-04 NOTE — PROGRESS NOTES
PRE DIAGNOSIS:  Gross hematuria with clot     POST DIAGNOSIS:  Gross hematuria with clot     PROCEDURE DETAILS:  I have reviewed the diagnosis and treatment options with patient. Risks and benefits explained and patient wishes to proceed with catheter change. Site prepped in sterile technique. Existing catheter removed with 18 [place] 3-way catheter inserted with 20 mL sterile water in balloon. Site clear with minimal amount of redness and no drainage noted. Placement was checked. Catheter connected to gravity bag, open system with continuous bladder irrigation infusing.       COMPLICATIONS:  No Complications.     FINDINGS: Urine dark red small clots.      INSTRUCTIONS: Appropriate post procedure instructions given to nursing. Verbalized understanding

## 2019-04-04 NOTE — PROGRESS NOTES
"   LOS: 9 days   Patient Care Team:  Seth Arce MD as PCP - General  [place], LIZET Douglas as PCP - Claims Attributed    Subjective     Subjective:  Symptoms:  He reports malaise, cough and weakness.  No shortness of breath.  (Pink urine no clots with CBI slowly running. Very tired. ).    Diet:  No nausea or vomiting.    Activity level: Impaired due to weakness.    Pain:  He reports no pain.        History taken from: patient chart RN    Objective     Vital Signs  Temp:  [96.5 °F (35.8 °C)-97.2 °F (36.2 °C)] 97 °F (36.1 °C)  Heart Rate:  [] 72  Resp:  [18-24] 18  BP: ()/(50-71) 101/50    Objective:  General Appearance:  In no acute distress.    Vital signs: (most recent): Blood pressure 101/50, pulse 72, temperature 97 °F (36.1 °C), temperature source Oral, resp. rate 18, height 175.3 cm (69\"), weight 66.8 kg (147 lb 3.2 oz), SpO2 96 %.  Vital signs are normal.  No fever.    Output: Producing urine (Arita gross hematuria no clots).    HEENT: Normal HEENT exam.    Lungs:  Normal effort and normal respiratory rate.  There are decreased breath sounds.    Heart: Normal rate.  Regular rhythm.  S1 normal and S2 normal.  Positive for murmur.    Chest: Symmetric chest wall expansion.   Abdomen: Abdomen is soft and non-distended.  Bowel sounds are normal.   There is no abdominal tenderness.     Extremities: Normal range of motion.    Pulses: Distal pulses are intact.    Neurological: Patient is alert and oriented to person, place and time.  GCS score is 15.    Pupils:  Pupils are equal, round, and reactive to light.    Skin:  Warm, dry and pale.  No rash, ecchymosis or cyanosis.             Results Review:    Lab Results (last 24 hours)     Procedure Component Value Units Date/Time    Tissue Pathology Exam [[id number]] Collected:  04/02/19 2043    Specimen:  Tissue from Ureter, Left Updated:  04/04/19 1144     Case Report --     Surgical Pathology Report                         Case: [id number]           " "                       Authorizing Provider:  Holland Johnson MD       Collected:           04/02/2019 08:43 PM          Ordering Location:     [facility]             Received:            04/03/2019 06:49 AM                                 [address]                                                          Pathologist:           Kyle Sheppard MD                                                           Specimen:    Ureter, Left, old j stent left ureter                                                       Final Diagnosis --     LEFT URETERAL STENT, REMOVAL:   GROSS EXAMINATION ONLY.       Gross Description --     A ureteral stent measures 28.0 cm between the pigtails.  It is blue on one end and white on the other with brown discoloration throughout.  It bears the caharcters \" 6FR. x  28 CM [employer]\".  No sections are submitted.       Urine Culture - Urine, Urine, Catheter [[id number]]  (Normal) Collected:  04/03/19 1925    Specimen:  Urine, Catheter Updated:  04/04/19 1141     Urine Culture Culture in progress    Basic Metabolic Panel [[id number]]  (Abnormal) Collected:  04/04/19 0712    Specimen:  Blood Updated:  04/04/19 0821     Glucose 96 mg/dL      BUN 47 mg/dL      Creatinine 1.80 mg/dL      Sodium 145 mmol/L      Potassium 4.2 mmol/L      Chloride 97 mmol/L      CO2 37.0 mmol/L      Calcium 9.5 mg/dL      eGFR Non African Amer 36 mL/min/1.73      BUN/Creatinine Ratio 26.1     Anion Gap 11.0 mmol/L     Narrative:       GFR Normal >60  Chronic Kidney Disease <60  Kidney Failure <15    Blood Culture - Blood, Arm, Right [[id number]] Collected:  04/03/19 1938    Specimen:  Blood from Arm, Right Updated:  04/04/19 0800     Blood Culture No growth at less than 24 hours    Blood Culture - Blood, Arm, Left [[id number]] Collected:  04/03/19 1948    Specimen:  Blood from Arm, Left Updated:  04/04/19 0800     Blood Culture No growth at less than 24 hours    Protime-INR [[id number]]  (Abnormal) " Collected:  04/04/19 0734    Specimen:  Blood Updated:  04/04/19 0749     Protime 30.1 Seconds      INR 3.05    Narrative:       Therapeutic range for most indications is 2.0-3.0 INR,  or 2.5-3.5 for mechanical heart valves.    CBC & Differential [[id number]] Collected:  04/04/19 0712    Specimen:  Blood Updated:  04/04/19 0721    Narrative:       The following orders were created for panel order CBC & Differential.  Procedure                               Abnormality         Status                     ---------                               -----------         ------                     CBC Auto Differential[[id number]]        Abnormal            Final result                 Please view results for these tests on the individual orders.    CBC Auto Differential [[id number]]  (Abnormal) Collected:  04/04/19 0712    Specimen:  Blood Updated:  04/04/19 0721     WBC 12.86 10*3/mm3      RBC 2.83 10*6/mm3      Hemoglobin 7.8 g/dL      Hematocrit 25.0 %      MCV 88.3 fL      MCH 27.6 pg      MCHC 31.2 g/dL      RDW 16.6 %      RDW-SD 53.0 fl      MPV 10.5 fL      Platelets 260 10*3/mm3      Neutrophil % 89.2 %      Lymphocyte % 4.0 %      Monocyte % 6.1 %      Eosinophil % 0.2 %      Basophil % 0.2 %      Immature Grans % 0.3 %      Neutrophils, Absolute 11.46 10*3/mm3      Lymphocytes, Absolute 0.52 10*3/mm3      Monocytes, Absolute 0.79 10*3/mm3      Eosinophils, Absolute 0.02 10*3/mm3      Basophils, Absolute 0.03 10*3/mm3      Immature Grans, Absolute 0.04 10*3/mm3      nRBC 0.0 /100 WBC     Extra Tubes [[id number]] Collected:  04/03/19 1948    Specimen:  Blood, Venous Line Updated:  04/03/19 2100    Narrative:       The following orders were created for panel order Extra Tubes.  Procedure                               Abnormality         Status                     ---------                               -----------         ------                     Light Blue Top[[id number]]                                   Final result                  Please view results for these tests on the individual orders.    Light Blue Top [[id number]] Collected:  04/03/19 1948    Specimen:  Blood Updated:  04/03/19 2100     Extra Tube hold for add-on     Comment: Auto resulted       Comprehensive Metabolic Panel [[id number]]  (Abnormal) Collected:  04/03/19 1948    Specimen:  Blood Updated:  04/03/19 2029     Glucose 88 mg/dL      BUN 44 mg/dL      Creatinine 1.60 mg/dL      Sodium 146 mmol/L      Potassium 4.2 mmol/L      Chloride 97 mmol/L      CO2 39.0 mmol/L      Calcium 9.2 mg/dL      Total Protein 5.4 g/dL      Albumin 2.90 g/dL      ALT (SGPT) 10 U/L      AST (SGOT) 17 U/L      Alkaline Phosphatase 75 U/L      Total Bilirubin 0.4 mg/dL      eGFR Non African Amer 42 mL/min/1.73      Globulin 2.5 gm/dL      A/G Ratio 1.2 g/dL      BUN/Creatinine Ratio 27.5     Anion Gap 10.0 mmol/L     Narrative:       GFR Normal >60  Chronic Kidney Disease <60  Kidney Failure <15    Lactic Acid, Plasma [[id number]]  (Normal) Collected:  04/03/19 1948    Specimen:  Blood Updated:  04/03/19 2022     Lactate 0.7 mmol/L     Urinalysis, Microscopic Only - Urine, Catheter [[id number]]  (Abnormal) Collected:  04/03/19 1925    Specimen:  Urine, Catheter Updated:  04/03/19 2015     RBC, UA Too Numerous to Count /HPF      Comment: Corrected result. Previous result was None Seen /HPF on 4/3/2019 at 2009 CDT.        WBC, UA 6-12 /HPF      Comment: Corrected result. Previous result was None Seen /HPF on 4/3/2019 at 2009 CDT.        Bacteria, UA 1+ /HPF      Comment: Corrected result. Previous result was None Seen /HPF on 4/3/2019 at 2009 CDT.        Squamous Epithelial Cells, UA 3-5 /HPF      Comment: Corrected result. Previous result was None Seen /HPF on 4/3/2019 at 2009 CDT.        Hyaline Casts, UA 3-6 /LPF      Methodology Automated Microscopy    Urinalysis With Culture If Indicated - Urine, Catheter [[id number]]  (Abnormal) Collected:  04/03/19 1925    Specimen:  Urine, Catheter  Updated:  04/03/19 2009     Color, UA Red     Appearance, UA Cloudy     pH, UA 6.0     Specific Gravity, UA 1.018     Glucose, UA Negative     Ketones, UA Trace     Bilirubin, UA Negative     Blood, UA Large (3+)     Protein, UA >=300 mg/dL (3+)     Leuk Esterase, UA Negative     Nitrite, UA Negative     Urobilinogen, UA 0.2 E.U./dL    CBC & Differential [[id number]] Collected:  04/03/19 1948    Specimen:  Blood Updated:  04/03/19 2000    Narrative:       The following orders were created for panel order CBC & Differential.  Procedure                               Abnormality         Status                     ---------                               -----------         ------                     CBC Auto Differential[[id number]]        Abnormal            Final result                 Please view results for these tests on the individual orders.    CBC Auto Differential [[id number]]  (Abnormal) Collected:  04/03/19 1948    Specimen:  Blood Updated:  04/03/19 2000     WBC 13.04 10*3/mm3      RBC 2.69 10*6/mm3      Hemoglobin 7.4 g/dL      Hematocrit 23.9 %      MCV 88.8 fL      MCH 27.5 pg      MCHC 31.0 g/dL      RDW 16.4 %      RDW-SD 52.8 fl      MPV 10.5 fL      Platelets 240 10*3/mm3      Neutrophil % 88.7 %      Lymphocyte % 4.1 %      Monocyte % 6.4 %      Eosinophil % 0.2 %      Basophil % 0.2 %      Immature Grans % 0.4 %      Neutrophils, Absolute 11.57 10*3/mm3      Lymphocytes, Absolute 0.53 10*3/mm3      Monocytes, Absolute 0.83 10*3/mm3      Eosinophils, Absolute 0.03 10*3/mm3      Basophils, Absolute 0.03 10*3/mm3      Immature Grans, Absolute 0.05 10*3/mm3      nRBC 0.0 /100 WBC     Hemoglobin & Hematocrit, Blood [[id number]]  (Abnormal) Collected:  04/03/19 1519    Specimen:  Blood Updated:  04/03/19 1548     Hemoglobin 7.4 g/dL      Hematocrit 24.5 %          Imaging Results (last 24 hours)     Procedure Component Value Units Date/Time    CT Head Without Contrast [[id number]] Collected:  04/03/19 1317      Updated:  04/03/19 1337    Narrative:       Noncontrast CT examination of the brain.    INDICATION: Alteration mental status. Confusion, delirium.    Technique: Axial 5 mm contiguous images with brain parenchymal  and bone windows    This exam was performed according to our departmental  dose-optimization program, which includes automated exposure  control, adjustment of the mA and/or kV according to patient size  and/or use of iterative reconstruction technique.    Prior relevant examination: CT brain October 27, 2017.    .  Involutional, atrophic changes. 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  Brain parenchyma appears otherwise within normal limits.  Ventricles are within normal limits in size. No evidence of  abnormal mass or calcification is seen. No evidence of acute  hemorrhage is noted. Bony structures appear within normal limits  and the mastoid air cells and visualized paranasal sinuses are  normally aerated.        Impression:       CONCLUSION:   Involutional, atrophic changes. Calcified dural plaque anterior  aspect of falx cerebri. CT brain without contrast is otherwise  unremarkable. No acute changes are present.       Electronically signed by:  Chano Samuels MD  4/3/2019 1:35 PM CDT  Workstation: [id number]           I reviewed the patient's new clinical results.  I reviewed the patient's new imaging results and agree with the interpretation.  I reviewed the patient's other test results and agree with the interpretation      Assessment/Plan       Pneumonia of left lower lobe due to infectious organism (CMS/HCC)    Atrial fibrillation [I48.91]    Long-term (current) use of anticoagulants    COPD (chronic obstructive pulmonary disease) (CMS/HCC)    Hydronephrosis    Hypertension    Hyperlipidemia      Assessment & Plan    1. Bacteremia  2. UTI cystitis   3. History of left hydronephrosis with J-stent in place  4. Gross hematuria no clots post op/post  Arita placement-->improving with CBI  -2/28/19 cystoscopy left ureteroscopy (findings: ), J-stent placed--pathology is negative for neoplasm  -4/2/19 cystoscopy and left J-stent removal  -Arita anchored 4/3/19 due to retention, CBI running now    -WBC  15.52-->11.96-->10.62-->9.65-->9.66-->10.40-->10.27-->14.44-->12.86  -Lactate 0.7  -Estimated Creatinine Clearance: 29.9 mL/min (A) (by C-G formula based on SCr of 1.8 mg/dL (H)).   -Cr 1.46-->1.51-->1.33-->1.30-->1.32-->1.29-->1.43-->1.8  -INR 2.84-->3.05  -Hgb/Hct 7.8/25.0  -3/26/19 urine and blood cultures positive for Pseudomonas aeruginosa  -Repeat urine and blood cultures in progress 4/3/19  -Cefepime day #9  -Flomax      Plan:   Continue Flomax and antibiotic, await cultures  Continue Arita with CBI, will titrate until discontinued  Monitor I&O, labs    LIZET Walters  04/04/19  11:54 AM

## 2019-04-04 NOTE — THERAPY TREATMENT NOTE
Acute Care - Occupational Therapy Treatment Note  [facility]     Patient Name: Seth Montiel  : 1935  MRN: [id number]  Today's Date: 2019  Onset of Illness/Injury or Date of Surgery: 19  Date of Referral to OT: 19  Referring Physician: ERNA Curran MD    Admit Date: 3/26/2019       ICD-10-CM ICD-9-CM   1. Pneumonia of left lower lobe due to infectious organism (CMS/HCC) J18.1 486   2. Poisoning by warfarin sodium, accidental or unintentional, initial encounter T45.511A 964.2     E858.2   3. Impaired functional mobility, balance, gait, and endurance Z74.09 V49.89   4. Impaired mobility and activities of daily living Z74.09 799.89   5. Dysphagia, unspecified type R13.10 787.20   6. Hydronephrosis N13.30 591     Patient Active Problem List   Diagnosis   • Atrial fibrillation [I48.91]   • Long-term (current) use of anticoagulants   • Nonrheumatic aortic valve disorder, unspecified   • Atrial fibrillation (CMS/HCC)   • COPD (chronic obstructive pulmonary disease) (CMS/HCC)   • SOB (shortness of breath)   • Aortic valve disorder   • Hematuria   • Hydronephrosis   • Pneumonia of left lower lobe due to infectious organism (CMS/HCC)   • Hypertension   • Hyperlipidemia     Past Medical History:   Diagnosis Date   • Aortic valve disorder    • Atrial fibrillation (CMS/HCC)    • COPD (chronic obstructive pulmonary disease) (CMS/HCC)    • Hyperlipidemia    • Hypertension    • Nonrheumatic aortic valve disorder, unspecified    • SOB (shortness of breath)      Past Surgical History:   Procedure Laterality Date   • AORTIC VALVE SURGERY     • APPENDECTOMY     • CARDIAC SURGERY     • CYSTOSCOPY, URETEROSCOPY, RETROGRADE PYELOGRAM, STENT INSERTION Left 2019    Procedure: CYSTOSCOPY, LEFT RETROGRADE, URETEROSCOPY, LASER LITHOTRIPSY, STENT PLACEMENT;  Surgeon: Holland Johnson MD;  Location: [facility];  Service: Urology   • CYSTOSCOPY, URETEROSCOPY, RETROGRADE PYELOGRAM, STENT INSERTION Left  4/2/2019    Procedure: CYSTOSCOPY, LEFT RETROGRADE,  URETEROSCOPY,  STENT  REMOVAL;  Surgeon: Holland Johnson MD;  Location: [facility];  Service: Urology   • GALLBLADDER SURGERY     • ROTATOR CUFF REPAIR         Therapy Treatment    Rehabilitation Treatment Summary     Row Name 04/04/19 0825             Treatment Time/Intention    Discipline  occupational therapy assistant  -BB      Document Type  therapy note (daily note)  -BB      Subjective Information  no complaints  -BB      Mode of Treatment  individual therapy;occupational therapy  -BB      Total Minutes, Occupational Therapy Treatment  55  -BB      Therapy Frequency (OT Eval)  other (see comments) 5-7 days/wk  -BB      Patient Effort  fair  -BB      Existing Precautions/Restrictions  fall;oxygen therapy device and L/min  -BB      Recorded by [BB] Munira Ferris COTA/L 04/04/19 1400      Row Name 04/04/19 0825             Vital Signs    Pretreatment Heart Rate (beats/min)  88  -BB      Intratreatment Heart Rate (beats/min)  86  -BB      Posttreatment Heart Rate (beats/min)  93  -BB      Pre SpO2 (%)  92  -BB      O2 Delivery Pre Treatment  supplemental O2  -BB      Intra SpO2 (%)  92  -BB      O2 Delivery Intra Treatment  supplemental O2  -BB      Post SpO2 (%)  90  -BB      O2 Delivery Post Treatment  supplemental O2  -BB      Pre Patient Position  Supine  -BB      Intra Patient Position  Supine  -BB      Post Patient Position  Supine  -BB      Recorded by [BB] Munira Ferris COTA/L 04/04/19 1400      Row Name 04/04/19 0825             Cognitive Assessment/Intervention- PT/OT    Orientation Status (Cognition)  oriented to;person  -BB      Recorded by [BB] Munira Ferris COTA/L 04/04/19 1400      Row Name 04/04/19 0825             Cognitive Assessment Intervention- SLP    Cognition, Comment  confused  -BB      Recorded by [BB] Munira Ferris COTA/L 04/04/19 1400      Row Name 04/04/19 0825             Bed Mobility  Assessment/Treatment    Comment (Bed Mobility)  pt defers EOB/OOB  -BB      Recorded by [BB] Munira Ferris COTA/L 04/04/19 1400      Row Name 04/04/19 0825             ADL Assessment/Intervention    Additional Documentation  -- pt already had a bath per CNA, however needs gown  -BB      Recorded by [BB] Munira Ferris COTA/L 04/04/19 1400      Row Name 04/04/19 0825             Upper Body Dressing Assessment/Training    Upper Body Dressing [place] Level  doff;don;set up;verbal cues;minimum assist (75% patient effort) hospital gown  -BB      Recorded by [BB] Munira Ferris COTA/L 04/04/19 1400      Row Name 04/04/19 0825             Grooming Assessment/Training    [place] Level (Grooming)  hair care, combing/brushing;wash face, hands;oral care regimen;set up;verbal cues;nonverbal cues (demo/gesture);contact guard assist  -BB      Grooming Position  long sitting  -BB      Recorded by [BB] Munira Ferris COTA/L 04/04/19 1400      Row Name 04/04/19 0825             Therapeutic Exercise    Upper Extremity Range of Motion (Therapeutic Exercise)  shoulder flexion/extension, bilateral;elbow flexion/extension, bilateral;wrist flexion/extension, bilateral;shoulder internal/external rotation, bilateral chest press  -BB      Hand (Therapeutic Exercise)  finger flexion/extension, bilateral  -BB      Weight/Resistance (Therapeutic Exercise)  1 pound  -BB      Exercise Type (Therapeutic Exercise)  AROM (active range of motion)  -BB      Position (Therapeutic Exercise)  supine  -BB      Sets/Reps (Therapeutic Exercise)  1x15  -BB      Equipment (Therapeutic Exercise)  free weight, barbell  -BB      Expected Outcome (Therapeutic Exercise)  improve functional stability;improve functional tolerance, self-care activity;improve performance, transfer skills  -BB      Comment (Therapeutic Exercise)  Pt with RBs PRN  -BB      Recorded by [BB] Munira Ferris COTA/L 04/04/19 1400      Row Name  04/04/19 0825             Positioning and Restraints    Pre-Treatment Position  in bed  -BB      Post Treatment Position  bed  -BB      In Bed  supine;call light within reach;encouraged to call for assist;exit alarm on  -BB      Recorded by [BB] Munira Ferris COTA/L 04/04/19 1400      Row Name 04/04/19 0825             Pain Assessment    Additional Documentation  Pain Scale: Numbers Pre/Post-Treatment (Group)  -BB      Recorded by [BB] Munira Ferris COTA/L 04/04/19 1400      Row Name 04/04/19 0825             Pain Scale: Numbers Pre/Post-Treatment    Pain Scale: Numbers, Pretreatment  0/10 - no pain  -BB      Pain Scale: Numbers, Post-Treatment  0/10 - no pain  -BB      Recorded by [BB] Munira Ferris COTA/L 04/04/19 1400      Row Name                Wound 03/26/19 2220 coccyx pressure injury    Wound - Properties Group Date first assessed: 03/26/19 [CN] Time first assessed: 2220 [CN] Present On Admission : yes;picture taken [CN] Location: coccyx [CN] Type: pressure injury [CN] Stage, Pressure Injury: Stage 2 [CN] Recorded by:  [CN] Roland Simpson RN 03/26/19 2241    Row Name                Wound 03/26/19 2220 Left lower gluteal pressure injury    Wound - Properties Group Date first assessed: 03/26/19 [CN] Time first assessed: 2220 [CN] Side: Left [CN] Orientation: lower [CN] Location: gluteal [CN] Type: pressure injury [CN] Stage, Pressure Injury: Stage 2 [CN] Recorded by:  [CN] Roland Simpson RN 03/26/19 2242    Row Name                Wound 03/27/19 1420 Left lower arm skin tear    Wound - Properties Group Date first assessed: 03/27/19 [BL] Time first assessed: 1420 [BL] Present On Admission : yes [BL] Side: Left [BL] Orientation: lower [BL] Location: arm [BL] Type: skin tear [BL] Recorded by:  [BL] Chelsea Chaves RN 03/27/19 1503    Row Name 04/04/19 0825             Plan of Care Review    Plan of Care Reviewed With  patient  -BB      Recorded by [BB] Munira Ferris COTA/RANDELL  04/04/19 1400      Row Name 04/04/19 0825             Outcome Summary/Treatment Plan (OT)    Daily Summary of Progress (OT)  progress toward functional goals is gradual  -BB      Plan for Continued Treatment (OT)  continue POC  -BB      Anticipated Discharge Disposition (OT)  skilled nursing facility  -BB      Recorded by [BB] Munira Ferris COTA/L 04/04/19 1400        User Key  (r) = Recorded By, (t) = Taken By, (c) = Cosigned By    Initials Name Effective Dates Discipline    BB Munira Ferris COTA/L 03/07/18 -  OT    BL Chelsea Chaves RN 07/26/16 -  Nurse    CN Roland Simpson RN 10/17/16 -  Nurse        Wound 03/26/19 2220 coccyx pressure injury (Active)   Dressing Appearance open to air 4/4/2019 11:03 AM   Closure Open to air 4/3/2019 10:06 PM   Base red;granulating 4/4/2019 11:03 AM   Periwound redness 4/3/2019 10:06 PM   Periwound Temperature warm 4/3/2019 10:06 PM   Periwound Skin Turgor soft 4/3/2019 10:06 PM   Edges irregular 4/3/2019 10:06 PM   Care, Wound barrier applied 4/4/2019 11:03 AM   Periwound Care, Wound barrier ointment applied 4/3/2019 10:06 PM       Wound 03/26/19 2220 Left lower gluteal pressure injury (Active)   Dressing Appearance open to air 4/4/2019 11:03 AM   Closure Open to air 4/3/2019 10:06 PM   Base red;granulating 4/4/2019 11:03 AM   Periwound blanchable 4/3/2019 10:06 PM   Periwound Temperature warm 4/3/2019 10:06 PM   Periwound Skin Turgor soft 4/3/2019 10:06 PM   Care, Wound barrier applied 4/4/2019 11:03 AM   Periwound Care, Wound barrier ointment applied 4/3/2019 10:06 PM       Wound 03/27/19 1420 Left lower arm skin tear (Active)   Dressing Appearance open to air 4/4/2019 11:03 AM   Closure Open to air 4/3/2019 10:06 PM   Base dry;pink;scab 4/4/2019 11:03 AM   Drainage Amount none 4/3/2019 10:06 PM   Care, Wound antimicrobial agent applied 4/4/2019 11:03 AM     Rehab Goal Summary     Row Name 04/04/19 0825             Occupational Therapy Goals    Bed Mobility  Goal Selection (OT)  bed mobility, OT goal 1  -BB      Transfer Goal Selection (OT)  transfer, OT goal 1  -BB      Bathing Goal Selection (OT)  bathing, OT goal 1  -BB      Dressing Goal Selection (OT)  dressing, OT goal 1  -BB         Bed Mobility Goal 1 (OT)    Activity/Assistive Device (Bed Mobility Goal 1, OT)  bed mobility activities, all  -BB      [place] Level/Cues Needed (Bed Mobility Goal 1, OT)  contact guard assist  -BB      Time Frame (Bed Mobility Goal 1, OT)  long term goal (LTG)  -BB      Progress/Outcomes (Bed Mobility Goal 1, OT)  goal not met  -BB         Transfer Goal 1 (OT)    Activity/Assistive Device (Transfer Goal 1, OT)  transfers, all  -BB      [place] Level/Cues Needed (Transfer Goal 1, OT)  supervision required  -BB      Time Frame (Transfer Goal 1, OT)  long term goal (LTG)  -BB      Progress/Outcome (Transfer Goal 1, OT)  goal not met  -BB         Bathing Goal 1 (OT)    Activity/Assistive Device (Bathing Goal 1, OT)  bathing skills, all  -BB      [place] Level/Cues Needed (Bathing Goal 1, OT)  minimum assist (75% or more patient effort)  -BB      Time Frame (Bathing Goal 1, OT)  long term goal (LTG)  -BB      Progress/Outcomes (Bathing Goal 1, OT)  goal not met  -BB         Dressing Goal 1 (OT)    Activity/Assistive Device (Dressing Goal 1, OT)  dressing skills, all  -BB      [place]/Cues Needed (Dressing Goal 1, OT)  minimum assist (75% or more patient effort)  -BB      Time Frame (Dressing Goal 1, OT)  long term goal (LTG)  -BB      Progress/Outcome (Dressing Goal 1, OT)  goal not met  -BB         Patient Education Goal (OT)    Activity (Patient Education Goal, OT)  B UE HEP for increased independence with functional mobility and ADLs, EC/WS and home safety/fall prev.  -BB      [place]/Cues/Accuracy (Memory Goal 2, OT)  demonstrates adequately;verbalizes understanding  -BB      Time Frame (Patient Education Goal, OT)  long term goal (LTG)  -BB       Progress/Outcome (Patient Education Goal, OT)  goal not met  -BB        User Key  (r) = Recorded By, (t) = Taken By, (c) = Cosigned By    Initials Name Provider Type Discipline    Munira Holliday COTA/L Occupational Therapy Assistant OT        Occupational Therapy Education     Title: PT OT SLP Therapies (In Progress)     Topic: Occupational Therapy (In Progress)     Point: ADL training (In Progress)     Description: Instruct learner(s) on proper safety adaptation and remediation techniques during self care or transfers.   Instruct in proper use of assistive devices.    Learning Progress Summary           Patient Acceptance, E, NR by BB at 4/4/2019  2:00 PM    Acceptance, E, NR by BB at 3/28/2019 10:44 AM                   Point: Home exercise program (In Progress)     Description: Instruct learner(s) on appropriate technique for monitoring, assisting and/or progressing therapeutic exercises/activities.    Learning Progress Summary           Patient Acceptance, E, NR by BB at 4/2/2019  1:32 PM    Acceptance, E, NR by BB at 4/1/2019  1:51 PM                   Point: Precautions (Done)     Description: Instruct learner(s) on prescribed precautions during self-care and functional transfers.    Learning Progress Summary           Patient Acceptance, E, VU,NR by TO at 3/30/2019  3:04 PM    Comment:  Pt educated on benefits of OOB and EOB to increase strength, improve circulation, and increase ax tolerance. pt declined this tx, but agree to do next date.    Acceptance, E, VU,NR by RB at 3/27/2019  4:21 PM    Comment:  Edu pt on use of gait belt and non skid socks when OOB and no OOB without assist.                               User Key     Initials Effective Dates Name Provider Type Discipline    RB 06/15/16 -  Ryley Suarez OT Occupational Therapist OT    BB 03/07/18 -  Munira Ferris COTA/L Occupational Therapy Assistant OT    TO 03/07/18 -  Alexis Tobias COTA/L Occupational Therapy Assistant OT               Non-skid socks and gait belt in place. Toileting offered. Call light and needs within reach. Pt advised to not get up alone and call the nurse for assistance.  Bed alarm on.     OT Recommendation and Plan  Outcome Summary/Treatment Plan (OT)  Daily Summary of Progress (OT): progress toward functional goals is gradual  Plan for Continued Treatment (OT): continue POC  Anticipated Discharge Disposition (OT): skilled nursing facility  Therapy Frequency (OT Eval): other (see comments)(5-7 days/wk)  Daily Summary of Progress (OT): progress toward functional goals is gradual  Plan of Care Review  Plan of Care Reviewed With: patient  Plan of Care Reviewed With: patient  Outcome Summary: Pt confused this am, however agreed to work with OT. Pt defers EOB/OOB. Pt Min A for doffing/donning gown with verbal cues. No goals met.  Outcome Measures     Row Name 04/04/19 0825 04/03/19 1600 04/03/19 0855       How much help from another person do you currently need...    Turning from your back to your side while in flat bed without using bedrails?  --  3  -RW  --    Moving from lying on back to sitting on the side of a flat bed without bedrails?  --  3  -RW  --    Moving to and from a bed to a chair (including a wheelchair)?  --  2  -RW  --    Standing up from a chair using your arms (e.g., wheelchair, bedside chair)?  --  2  -RW  --    Climbing 3-5 steps with a railing?  --  2  -RW  --    To walk in hospital room?  --  2  -RW  --    AM-PAC 6 Clicks Score  --  14  -RW  --       How much help from another is currently needed...    Putting on and taking off regular lower body clothing?  1  -BB  --  1  -BB    Bathing (including washing, rinsing, and drying)  2  -BB  --  2  -BB    Toileting (which includes using toilet bed pan or urinal)  2  -BB  --  2  -BB    Putting on and taking off regular upper body clothing  3  -BB  --  3  -BB    Taking care of personal grooming (such as brushing teeth)  3  -BB  --  3  -BB    Eating meals  3   -BB  --  3  -BB    Score  14  -BB  --  14  -BB    Row Name 04/02/19 0852             How much help from another is currently needed...    Putting on and taking off regular lower body clothing?  1  -BB      Bathing (including washing, rinsing, and drying)  2  -BB      Toileting (which includes using toilet bed pan or urinal)  2  -BB      Putting on and taking off regular upper body clothing  3  -BB      Taking care of personal grooming (such as brushing teeth)  3  -BB      Eating meals  3  -BB      Score  14  -BB        User Key  (r) = Recorded By, (t) = Taken By, (c) = Cosigned By    Initials Name Provider Type    RW Dustin Sanchez, MAK Physical Therapy Assistant    BB Munira Ferris COTA/L Occupational Therapy Assistant           Time Calculation:   Time Calculation- OT     Row Name 04/04/19 1403             Time Calculation- OT    OT Start Time  0825  -BB      OT Stop Time  0920  -BB      OT Time Calculation (min)  55 min  -BB      Total Timed Code Minutes- OT  55 minute(s)  -BB      OT Received On  04/04/19  -BB        User Key  (r) = Recorded By, (t) = Taken By, (c) = Cosigned By    Initials Name Provider Type    BB Munira Ferris COTA/L Occupational Therapy Assistant        Therapy Charges for Today     Code Description Service Date Service Provider Modifiers Qty    04005453634 HC OT SELF CARE/MGMT/TRAIN EA 15 MIN 4/3/2019 Munira Ferris COTA/L GO 1    46126856170 HC OT SELF CARE/MGMT/TRAIN EA 15 MIN 4/4/2019 Munira Ferris COTA/L GO 2    49409922223 HC OT THER PROC EA 15 MIN 4/4/2019 Munira Ferris COTA/L GO 2               AUDI Smith  4/4/2019

## 2019-04-04 NOTE — PLAN OF CARE
Problem: Patient Care Overview  Goal: Plan of Care Review  Outcome: Ongoing (interventions implemented as appropriate)   04/04/19 1401   OTHER   Outcome Summary Pt confused this am, however agreed to work with OT. Pt defers EOB/OOB. Pt Min A for doffing/donning gown with verbal cues. No goals met.   Coping/Psychosocial   Plan of Care Reviewed With patient   Plan of Care Review   Progress no change

## 2019-04-04 NOTE — SIGNIFICANT NOTE
04/04/19 1535   Rehab Treatment   Discipline physical therapy assistant   Reason Treatment Not Performed patient/family declined treatment   I just cant today. I dont want to do anything.

## 2019-04-04 NOTE — PROGRESS NOTES
[facility] Medicine Services  INPATIENT PROGRESS NOTE    Length of Stay: 9  Date of Admission: 3/26/2019  Primary Care Physician: Seth Arce MD    Subjective   Chief Complaint: No new complaints.    HPI:    Seth Montiel is a 83 y.o. male with medical history significant for COPD, atrial fibrillation, porcine valve replacement, hyperlipidemia, hypertension, and history of tobacco abuse presents with cough and weakness.      Patient was seen at his primary care provider's office for follow-up after discharge from skilled nursing facility.  Patient does report a productive cough with some fatigue.  Patient was noted to have left lower lobe pneumonia on chest x-ray and a supratherapeutic INR of greater than 8.  As such patient was sent to the emergency department for further evaluation.     Patient is a frail-appearing gentleman with barrel chest noted on exam.  Patient does have chronic hypoxic respiratory failure for which he uses 4 L/min nasal cannula and at time of exam is currently on 4 L/min nasal cannula.  Patient does endorse some fatigue, weakness, productive cough but denied any fever, chills, nausea, vomiting, chest pain, or diaphoresis    He is status post removal of J stent and currently on CBI due to gross hematuria which has much improved. He remains deconditioned, short of air on exertion and remains on his baseline supplemental oxygen of 4 L nasal prongs and maintaining saturation in the low 90s.      Review of Systems   Constitutional: Positive for activity change, appetite change and fatigue. Negative for chills, diaphoresis and fever.   HENT: Negative for trouble swallowing and voice change.    Eyes: Negative for photophobia and visual disturbance.   Respiratory: Positive for cough and shortness of breath. Negative for choking, chest tightness, wheezing and stridor.    Cardiovascular: Positive for leg swelling. Negative for chest pain and  palpitations.   Gastrointestinal: Negative for abdominal distention, abdominal pain, blood in stool, constipation, diarrhea, nausea and vomiting.   Endocrine: Negative for cold intolerance, heat intolerance, polydipsia, polyphagia and polyuria.   Genitourinary: Negative for decreased urine volume, difficulty urinating, dysuria, enuresis, flank pain, frequency, hematuria and urgency.   Musculoskeletal: Negative for arthralgias, gait problem, myalgias, neck pain and neck stiffness.   Skin: Negative for pallor, rash and wound.   Neurological: Positive for weakness. Negative for dizziness, tremors, seizures, syncope, facial asymmetry, speech difficulty, light-headedness, numbness and headaches.   Hematological: Does not bruise/bleed easily.   Psychiatric/Behavioral: Negative for agitation, behavioral problems and confusion.       Objective    Temp:  [96.5 °F (35.8 °C)-97.2 °F (36.2 °C)] 96.5 °F (35.8 °C)  Heart Rate:  [] 98  Resp:  [18-24] 20  BP: ()/(55-70) 98/70    Physical Exam   Constitutional: He is oriented to person, place, and time. He appears well-developed and well-nourished. No distress.   HENT:   Head: Normocephalic and atraumatic.   Eyes: EOM are normal. Pupils are equal, round, and reactive to light. No scleral icterus.   Neck: Normal range of motion. Neck supple. No JVD present. No thyromegaly present.   Cardiovascular: Normal rate and normal heart sounds. An irregularly irregular rhythm present. Exam reveals no gallop and no friction rub.   No murmur heard.  Pulmonary/Chest: Effort normal. He has decreased breath sounds. He has no wheezes. He has rhonchi. He has no rales. He exhibits no tenderness.   Abdominal: Soft. Bowel sounds are normal. He exhibits no distension and no mass. There is no tenderness. There is no rebound and no guarding.   Genitourinary:   Genitourinary Comments: Arita catheter is in place gross hematuria has essentially resolved.   Musculoskeletal: He exhibits edema. He  exhibits no tenderness or deformity.   Neurological: He is alert and oriented to person, place, and time. No cranial nerve deficit. He exhibits normal muscle tone. Coordination normal.   Skin: Skin is warm and dry. No rash noted. He is not diaphoretic. No erythema. No pallor.   He has trace edema both legs with chronic venous stasis changes.   Psychiatric: He has a normal mood and affect. His behavior is normal. Judgment and thought content normal.   Nursing note and vitals reviewed.        Medication Review:    Current Facility-Administered Medications:   •  albuterol (PROVENTIL) nebulizer solution 0.083% 2.5 mg/3mL, 2.5 mg, Nebulization, Q6H PRN, Idem, Zina Fulton MD, 2.5 mg at 04/03/19 0428  •  atorvastatin (LIPITOR) tablet 10 mg, 10 mg, Oral, Nightly, Guy Evans MD, 10 mg at 04/03/19 2105  •  bacitracin ointment, , Topical, Daily, Guy Evans MD  •  bisacodyl (DULCOLAX) suppository 10 mg, 10 mg, Rectal, Daily PRN, Austin Chaudhry MD, 10 mg at 04/02/19 1458  •  cefepime (MAXIPIME) 2 g/100 mL 0.9% NS (mbp), 2 g, Intravenous, Q12H, Guy Evans MD, Last Rate: 200 mL/hr at 04/04/19 0214, 2 g at 04/04/19 0214  •  dextrose (D5W) 5 % infusion, 75 mL/hr, Intravenous, Continuous, Austin Chaudhry MD, Last Rate: 50 mL/hr at 04/02/19 2129, 50 mL/hr at 04/02/19 2129  •  diltiaZEM CD (CARDIZEM CD) 24 hr capsule 240 mg, 240 mg, Oral, Nightly, Guy Evans MD, 240 mg at 04/03/19 2105  •  ferrous sulfate EC tablet 324 mg, 324 mg, Oral, BID With Meals, Guy Evans MD, 324 mg at 04/03/19 1752  •  flecainide (TAMBOCOR) tablet 50 mg, 50 mg, Oral, Q12H, Guy Evans MD, 50 mg at 04/03/19 2105  •  folic acid (FOLVITE) tablet 1 mg, 1 mg, Oral, Daily, Guy Evans MD, 1 mg at 04/03/19 0916  •  furosemide (LASIX) tablet 20 mg, 20 mg, Oral, BID, Guy Evans MD, 20 mg at 04/03/19 1752  •  ipratropium-albuterol (DUO-NEB) nebulizer solution 3 mL, 3 mL, Nebulization, 4x Daily - RT, Guy Evans MD, 3 mL at  04/04/19 0729  •  levothyroxine (SYNTHROID, LEVOTHROID) tablet 50 mcg, 50 mcg, Oral, QAM AC, Guy Evans MD, 50 mcg at 04/03/19 0915  •  lidocaine (XYLOCAINE) 2 % jelly, , , PRN, [place], Holland HOLBROOK MD, 10 mL at 04/02/19 1901  •  lidocaine (XYLOCAINE) 2 % jelly, , Urethral, Once, [place], Holland HOLBROOK MD  •  magic butt ointment, , Topical, BID, Guy Evans MD  •  melatonin tablet 6 mg, 6 mg, Oral, Nightly PRN, Guy Evans MD, 6 mg at 04/02/19 0024  •  sodium chloride 0.9 % flush 10 mL, 10 mL, Intravenous, PRN, Demar Finch MD, 10 mL at 04/02/19 1015  •  sodium chloride 0.9 % flush 3 mL, 3 mL, Intravenous, Q12H, Guy Evans MD, 3 mL at 04/03/19 2105  •  sodium chloride 0.9 % flush 3-10 mL, 3-10 mL, Intravenous, PRN, Guy Evans MD, 10 mL at 03/29/19 1710  •  tamsulosin (FLOMAX) 24 hr capsule 0.4 mg, 0.4 mg, Oral, Nightly, Guy Evans MD, 0.4 mg at 04/03/19 2106  •  vitamin B-12 (CYANOCOBALAMIN) tablet 1,000 mcg, 1,000 mcg, Oral, Daily, Guy Evans MD, 1,000 mcg at 04/03/19 0915    Results Review:  I have reviewed the labs, radiology results, and diagnostic studies.    Laboratory Data:   Results from last 7 days   Lab Units 04/04/19  0712 04/03/19  1948 04/03/19  0547   SODIUM mmol/L 145 146* 145   POTASSIUM mmol/L 4.2 4.2 4.1   CHLORIDE mmol/L 97* 97* 98   CO2 mmol/L 37.0* 39.0* 40.0*   BUN mg/dL 47* 44* 44*   CREATININE mg/dL 1.80* 1.60* 1.43*   GLUCOSE mg/dL 96 88 96   CALCIUM mg/dL 9.5 9.2 9.1   BILIRUBIN mg/dL  --  0.4  --    ALK PHOS U/L  --  75  --    ALT (SGPT) U/L  --  10  --    AST (SGOT) U/L  --  17  --    ANION GAP mmol/L 11.0 10.0 7.0     Estimated Creatinine Clearance: 29.9 mL/min (A) (by C-G formula based on SCr of 1.8 mg/dL (H)).          Results from last 7 days   Lab Units 04/04/19  0712 04/03/19  1948 04/03/19  1519 04/03/19  0547 04/02/19  0614 04/01/19  0609   WBC 10*3/mm3 12.86* 13.04*  --  14.44* 10.27 10.40   HEMOGLOBIN g/dL 7.8* 7.4* 7.4* 7.3* 7.6* 7.5*   HEMATOCRIT %  25.0* 23.9* 24.5* 23.9* 24.7* 25.1*   PLATELETS 10*3/mm3 260 240  --  211 206 214     Results from last 7 days   Lab Units 04/04/19  0734 04/03/19  0547 04/02/19  0614 04/01/19  0609 03/31/19  0601   INR  3.05* 2.84* 2.66* 2.75* 3.11*       Culture Data:   Blood Culture   Date Value Ref Range Status   04/03/2019 No growth at less than 24 hours  Preliminary   04/03/2019 No growth at less than 24 hours  Preliminary     No results found for: URINECX  No results found for: RESPCX  No results found for: WOUNDCX  No results found for: STOOLCX  No components found for: BODYFLD    Radiology Data:   Imaging Results (last 24 hours)     Procedure Component Value Units Date/Time    CT Head Without Contrast [[id number]] Collected:  04/03/19 1317     Updated:  04/03/19 1337    Narrative:       Noncontrast CT examination of the brain.    INDICATION: Alteration mental status. Confusion, delirium.    Technique: Axial 5 mm contiguous images with brain parenchymal  and bone windows    This exam was performed according to our departmental  dose-optimization program, which includes automated exposure  control, adjustment of the mA and/or kV according to patient size  and/or use of iterative reconstruction technique.    Prior relevant examination: CT brain October 27, 2017.    .  Involutional, atrophic changes. 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  Brain parenchyma appears otherwise within normal limits.  Ventricles are within normal limits in size. No evidence of  abnormal mass or calcification is seen. No evidence of acute  hemorrhage is noted. Bony structures appear within normal limits  and the mastoid air cells and visualized paranasal sinuses are  normally aerated.        Impression:       CONCLUSION:   Involutional, atrophic changes. Calcified dural plaque anterior  aspect of falx cerebri. CT brain without contrast is otherwise  unremarkable. No acute changes are  present.       Electronically signed by:  Chano Samuels MD  4/3/2019 1:35 PM CDT  Workstation: [id number]          I have reviewed the patient's current medications.     Assessment/Plan     Hospital Problem List:  Principal Problem:    Pneumonia of left lower lobe due to infectious organism (CMS/[facility])  Active Problems:    Atrial fibrillation [I48.91]    Long-term (current) use of anticoagulants    COPD (chronic obstructive pulmonary disease) (CMS/HCC)    Hypertension    Hyperlipidemia    -Left lower lobe pneumonia with Pseudomonas bacteremia: Patient was initially on meropenem and has been transitioned to cefepime.  Repeat chest x-ray in a.m.    Acute cystitis: Urine culture is positive for Pseudomonas.  Continue cefepime.    -Acute exacerbation of COPD: Improved as patient is back to his baseline supplemental oxygen.  Continue supplemental oxygen, bronchodilators and steroids.    -Chronic atrial fibrillation: Patient is in controlled ventricular response.  Continue rate control.  Anticoagulation is on hold for now due to gross hematuria.       -History of left-sided hydronephrosis status post J stent placement: Continue Flomax.  Patient is status post removal of J stent on 4/2/2019.  He has had gross hematuria which has essentially resolved with ongoing CBI. Urologist is following.      -History of severe aortic stenosis: Patient is on routine surveillance by his primary cardiologist.  Echocardiogram done on 3/27/2019 showed:  · Left atrial cavity size is moderate-to-severely dilated.  · Severe aortic valve stenosis is present.  · Mild mitral valve regurgitation is present  · Mild tricuspid valve regurgitation is present.  · Estimated EF = 65%.  · Left ventricular systolic function is normal.  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    -Hypernatremia: Resolved.         -Chronic kidney disease  stage III:  Patient's baseline creatinine is between 1.4-1.65.  Continue to monitor, avoid nephrotoxins and consult nephrologist if the need arises.    -Anemia of chronic inflammation: Hemoglobin remains stable.  Continue iron supplementation, routine monitoring of hemoglobin and transfuse if the need arises.    -Dependent edema both legs with chronic stasis changes: Much improved with diuretics.    Deconditioning: Continue PT and OT.    Discharge Planning: In progress.    Austin Chaudhry MD   04/04/19   10:07 AM

## 2019-04-04 NOTE — PLAN OF CARE
Problem: Patient Care Overview  Goal: Plan of Care Review  Outcome: Ongoing (interventions implemented as appropriate)   04/04/19 0447   Coping/Psychosocial   Plan of Care Reviewed With patient   Plan of Care Review   Progress no change       Problem: Fall Risk (Adult)  Goal: Absence of Fall  Outcome: Ongoing (interventions implemented as appropriate)   04/04/19 0447   Fall Risk (Adult)   Absence of Fall achieves outcome       Problem: Skin Injury Risk (Adult)  Goal: Skin Health and Integrity  Outcome: Ongoing (interventions implemented as appropriate)   04/04/19 0447   Skin Injury Risk (Adult)   Skin Health and Integrity making progress toward outcome       Problem: Wound (Includes Pressure Injury) (Adult)  Goal: Signs and Symptoms of Listed Potential Problems Will be Absent, Minimized or Managed (Wound)  Outcome: Ongoing (interventions implemented as appropriate)   04/04/19 0447   Goal/Outcome Evaluation   Problems Assessed (Wound) all   Problems Present (Wound) situational response       Problem: Pneumonia (Adult)  Goal: Signs and Symptoms of Listed Potential Problems Will be Absent, Minimized or Managed (Pneumonia)  Outcome: Ongoing (interventions implemented as appropriate)   04/04/19 0447   Goal/Outcome Evaluation   Problems Present (Pneumonia) respiratory compromise;infection progression       Problem: Chronic Obstructive Pulmonary Disease (Adult)  Goal: Signs and Symptoms of Listed Potential Problems Will be Absent, Minimized or Managed (Chronic Obstructive Pulmonary Disease)  Outcome: Ongoing (interventions implemented as appropriate)   04/04/19 0447   Goal/Outcome Evaluation   Problems Assessed (Chronic Obstructive Pulmonary Disease (COPD)) all   Problems Present (COPD, Bronch/Emphy) respiratory compromise;situational response;functional deficit

## 2019-04-04 NOTE — THERAPY TREATMENT NOTE
Acute Care - Occupational Therapy Treatment Note  [facility]     Patient Name: Seth Montiel  : 1935  MRN: [id number]  Today's Date: 2019  Onset of Illness/Injury or Date of Surgery: 19  Date of Referral to OT: 19  Referring Physician: ERNA Curran MD    Admit Date: 3/26/2019       ICD-10-CM ICD-9-CM   1. Pneumonia of left lower lobe due to infectious organism (CMS/HCC) J18.1 486   2. Poisoning by warfarin sodium, accidental or unintentional, initial encounter T45.511A 964.2     E858.2   3. Impaired functional mobility, balance, gait, and endurance Z74.09 V49.89   4. Impaired mobility and activities of daily living Z74.09 799.89   5. Dysphagia, unspecified type R13.10 787.20   6. Hydronephrosis N13.30 591     Patient Active Problem List   Diagnosis   • Atrial fibrillation [I48.91]   • Long-term (current) use of anticoagulants   • Nonrheumatic aortic valve disorder, unspecified   • Atrial fibrillation (CMS/HCC)   • COPD (chronic obstructive pulmonary disease) (CMS/HCC)   • SOB (shortness of breath)   • Aortic valve disorder   • Hematuria   • Hydronephrosis   • Pneumonia of left lower lobe due to infectious organism (CMS/HCC)   • Hypertension   • Hyperlipidemia     Past Medical History:   Diagnosis Date   • Aortic valve disorder    • Atrial fibrillation (CMS/HCC)    • COPD (chronic obstructive pulmonary disease) (CMS/HCC)    • Hyperlipidemia    • Hypertension    • Nonrheumatic aortic valve disorder, unspecified    • SOB (shortness of breath)      Past Surgical History:   Procedure Laterality Date   • AORTIC VALVE SURGERY     • APPENDECTOMY     • CARDIAC SURGERY     • CYSTOSCOPY, URETEROSCOPY, RETROGRADE PYELOGRAM, STENT INSERTION Left 2019    Procedure: CYSTOSCOPY, LEFT RETROGRADE, URETEROSCOPY, LASER LITHOTRIPSY, STENT PLACEMENT;  Surgeon: Holland Johnson MD;  Location: [facility];  Service: Urology   • CYSTOSCOPY, URETEROSCOPY, RETROGRADE PYELOGRAM, STENT INSERTION Left  4/2/2019    Procedure: CYSTOSCOPY, LEFT RETROGRADE,  URETEROSCOPY,  STENT  REMOVAL;  Surgeon: Holland Johnson MD;  Location: [facility];  Service: Urology   • GALLBLADDER SURGERY     • ROTATOR CUFF REPAIR         Therapy Treatment    Rehabilitation Treatment Summary     Row Name 04/04/19 1102 04/04/19 0825          Treatment Time/Intention    Discipline  occupational therapy assistant  -BB  occupational therapy assistant  -BB     Document Type  therapy note (daily note)  -BB  therapy note (daily note)  -BB     Subjective Information  no complaints  -BB  no complaints  -BB     Mode of Treatment  individual therapy;occupational therapy  -BB  individual therapy;occupational therapy  -BB     Total Minutes, Occupational Therapy Treatment  23  -BB  55  -BB     Therapy Frequency (OT Eval)  other (see comments) 5-7 days/wk  -BB  other (see comments) 5-7 days/wk  -BB     Patient Effort  fair  -BB  fair  -BB     Comment  Pt requesting SOLOMON to enter room  -BB  --     Existing Precautions/Restrictions  fall;oxygen therapy device and L/min  -BB  fall;oxygen therapy device and L/min  -BB     Recorded by [BB] Munira Ferris OSLOMON/L 04/04/19 1440 [BB] Munira Ferris COTA/L 04/04/19 1400     Row Name 04/04/19 1102 04/04/19 0825          Vital Signs    Pretreatment Heart Rate (beats/min)  86  -BB  88  -BB     Intratreatment Heart Rate (beats/min)  --  86  -BB     Posttreatment Heart Rate (beats/min)  --  93  -BB     Pre SpO2 (%)  93  -BB  92  -BB     O2 Delivery Pre Treatment  supplemental O2  -BB  supplemental O2  -BB     Intra SpO2 (%)  --  92  -BB     O2 Delivery Intra Treatment  --  supplemental O2  -BB     Post SpO2 (%)  --  90  -BB     O2 Delivery Post Treatment  --  supplemental O2  -BB     Pre Patient Position  Supine  -BB  Supine  -BB     Intra Patient Position  --  Supine  -BB     Post Patient Position  --  Supine  -BB     Recorded by [BB] Munira Ferris SOLOMON/L 04/04/19 1440 [BB] Munira Ferris,  "SOLOMON/L 04/04/19 1400     Row Name 04/04/19 1102 04/04/19 0825          Cognitive Assessment/Intervention- PT/OT    Orientation Status (Cognition)  oriented to;person  -BB  oriented to;person  -BB     Recorded by [BB] Munira Ferris COTA/RANDELL 04/04/19 1440 [BB] Munira Ferris COTA/L 04/04/19 1400     Row Name 04/04/19 0825             Cognitive Assessment Intervention- SLP    Cognition, Comment  confused  -BB      Recorded by [BB] Munira Ferris COTA/L 04/04/19 1400      Row Name 04/04/19 1103 04/04/19 1102 04/04/19 0825       Bed Mobility Assessment/Treatment    Rolling Left [place] (Bed Mobility)  --  minimum assist (75% patient effort)  -BB  --    Rolling Right [place] (Bed Mobility)  --  minimum assist (75% patient effort)  -BB  --    Bed Mobility, Safety Issues  --  decreased use of arms for pushing/pulling;impaired trunk control for bed mobility  -BB  --    Comment (Bed Mobility)  Pt also wants sheets straightened up and himself repositioned in bed  -BB  Pt states he has \"used the bathroom in the bed\"  -BB  pt defers EOB/OOB  -BB    Recorded by [BB] Munira Ferris COTA/RANDELL 04/04/19 1440 [BB] Munira Ferris SOLOMON/RANDELL 04/04/19 1440 [BB] Munira Ferris COTA/L 04/04/19 1400    Row Name 04/04/19 0825             ADL Assessment/Intervention    Additional Documentation  -- pt already had a bath per CNA, however needs gown  -BB      Recorded by [BB] Munira Ferris COTA/L 04/04/19 1400      Row Name 04/04/19 1103             Bathing Assessment/Intervention    Bathing [place] Level  lower body;dependent (less than 25% patient effort)  -BB      Bathing Position  long sitting  -BB      Recorded by [BB] Munira Ferris COTA/L 04/04/19 1440      Row Name 04/04/19 0825             Upper Body Dressing Assessment/Training    Upper Body Dressing [place] Level  doff;don;set up;verbal cues;minimum assist (75% patient effort) hospital gown  -ALMA ROSA      Recorded by [BB] " Munira Ferris COTA/L 04/04/19 1400      Row Name 04/04/19 0825             Grooming Assessment/Training    [place] Level (Grooming)  hair care, combing/brushing;wash face, hands;oral care regimen;set up;verbal cues;nonverbal cues (demo/gesture);contact guard assist  -BB      Grooming Position  long sitting  -BB      Recorded by [BB] Munira Ferris COTA/L 04/04/19 1400      Row Name 04/04/19 0825             Therapeutic Exercise    Upper Extremity Range of Motion (Therapeutic Exercise)  shoulder flexion/extension, bilateral;elbow flexion/extension, bilateral;wrist flexion/extension, bilateral;shoulder internal/external rotation, bilateral chest press  -BB      Hand (Therapeutic Exercise)  finger flexion/extension, bilateral  -BB      Weight/Resistance (Therapeutic Exercise)  1 pound  -BB      Exercise Type (Therapeutic Exercise)  AROM (active range of motion)  -BB      Position (Therapeutic Exercise)  supine  -BB      Sets/Reps (Therapeutic Exercise)  1x15  -BB      Equipment (Therapeutic Exercise)  free weight, barbell  -BB      Expected Outcome (Therapeutic Exercise)  improve functional stability;improve functional tolerance, self-care activity;improve performance, transfer skills  -BB      Comment (Therapeutic Exercise)  Pt with RBs PRN  -BB      Recorded by [BB] Munira Ferris COTA/L 04/04/19 1400      Row Name 04/04/19 1103 04/04/19 0825          Positioning and Restraints    Pre-Treatment Position  in bed  -BB  in bed  -BB     Post Treatment Position  bed  -BB  bed  -BB     In Bed  supine;fowlers;call light within reach;encouraged to call for assist;exit alarm on  -BB  supine;call light within reach;encouraged to call for assist;exit alarm on  -BB     Recorded by [BB] Munira Ferris COTA/RANDELL 04/04/19 1440 [BB] Munira Ferris COTA/L 04/04/19 1400     Row Name 04/04/19 0825             Pain Assessment    Additional Documentation  Pain Scale: Numbers Pre/Post-Treatment (Group)   -BB      Recorded by [BB] Munira Ferris COTA/L 04/04/19 1400      Row Name 04/04/19 1103 04/04/19 0825          Pain Scale: Numbers Pre/Post-Treatment    Pain Scale: Numbers, Pretreatment  0/10 - no pain  -BB  0/10 - no pain  -BB     Pain Scale: Numbers, Post-Treatment  0/10 - no pain  -BB  0/10 - no pain  -BB     Recorded by [BB] Munira Ferris COTA/RANDELL 04/04/19 1440 [BB] Munira Ferris COTA/L 04/04/19 1400     Row Name                Wound 03/26/19 2220 coccyx pressure injury    Wound - Properties Group Date first assessed: 03/26/19 [CN] Time first assessed: 2220 [CN] Present On Admission : yes;picture taken [CN] Location: coccyx [CN] Type: pressure injury [CN] Stage, Pressure Injury: Stage 2 [CN] Recorded by:  [CN] Roland Simpson RN 03/26/19 2241    Row Name                Wound 03/26/19 2220 Left lower gluteal pressure injury    Wound - Properties Group Date first assessed: 03/26/19 [CN] Time first assessed: 2220 [CN] Side: Left [CN] Orientation: lower [CN] Location: gluteal [CN] Type: pressure injury [CN] Stage, Pressure Injury: Stage 2 [CN] Recorded by:  [CN] Roland Simpson RN 03/26/19 2242    Row Name                Wound 03/27/19 1420 Left lower arm skin tear    Wound - Properties Group Date first assessed: 03/27/19 [BL] Time first assessed: 1420 [BL] Present On Admission : yes [BL] Side: Left [BL] Orientation: lower [BL] Location: arm [BL] Type: skin tear [BL] Recorded by:  [BL] Chelsea Chaves RN 03/27/19 1503    Row Name 04/04/19 0825             Plan of Care Review    Plan of Care Reviewed With  patient  -BB      Recorded by [BB] Munira Ferris COTA/L 04/04/19 1400      Row Name 04/04/19 1103 04/04/19 0825          Outcome Summary/Treatment Plan (OT)    Daily Summary of Progress (OT)  progress toward functional goals is gradual  -BB  progress toward functional goals is gradual  -BB     Plan for Continued Treatment (OT)  continue pOC  -BB  continue POC  -BB     Anticipated  Discharge Disposition (OT)  skilled nursing facility  -BB  skilled nursing facility  -     Recorded by [BB] Munira Ferris COTA/RANDELL 04/04/19 1440 [BB] Munira Ferris COTA/L 04/04/19 1400       User Key  (r) = Recorded By, (t) = Taken By, (c) = Cosigned By    Initials Name Effective Dates Discipline     Munira Ferris COTA/L 03/07/18 -  OT    BL Chelsea Chaves RN 07/26/16 -  Nurse    CN Roland Simpson RN 10/17/16 -  Nurse        Wound 03/26/19 2220 coccyx pressure injury (Active)   Dressing Appearance open to air 4/4/2019 11:03 AM   Closure Open to air 4/3/2019 10:06 PM   Base red;granulating 4/4/2019 11:03 AM   Periwound redness 4/3/2019 10:06 PM   Periwound Temperature warm 4/3/2019 10:06 PM   Periwound Skin Turgor soft 4/3/2019 10:06 PM   Edges irregular 4/3/2019 10:06 PM   Care, Wound barrier applied 4/4/2019 11:03 AM   Periwound Care, Wound barrier ointment applied 4/3/2019 10:06 PM       Wound 03/26/19 2220 Left lower gluteal pressure injury (Active)   Dressing Appearance open to air 4/4/2019 11:03 AM   Closure Open to air 4/3/2019 10:06 PM   Base red;granulating 4/4/2019 11:03 AM   Periwound blanchable 4/3/2019 10:06 PM   Periwound Temperature warm 4/3/2019 10:06 PM   Periwound Skin Turgor soft 4/3/2019 10:06 PM   Care, Wound barrier applied 4/4/2019 11:03 AM   Periwound Care, Wound barrier ointment applied 4/3/2019 10:06 PM       Wound 03/27/19 1420 Left lower arm skin tear (Active)   Dressing Appearance open to air 4/4/2019 11:03 AM   Closure Open to air 4/3/2019 10:06 PM   Base dry;pink;scab 4/4/2019 11:03 AM   Drainage Amount none 4/3/2019 10:06 PM   Care, Wound antimicrobial agent applied 4/4/2019 11:03 AM     Rehab Goal Summary     Row Name 04/04/19 0825             Occupational Therapy Goals    Bed Mobility Goal Selection (OT)  bed mobility, OT goal 1  -BB      Transfer Goal Selection (OT)  transfer, OT goal 1  -BB      Bathing Goal Selection (OT)  bathing, OT goal 1  -BB       Dressing Goal Selection (OT)  dressing, OT goal 1  -BB         Bed Mobility Goal 1 (OT)    Activity/Assistive Device (Bed Mobility Goal 1, OT)  bed mobility activities, all  -BB      [place] Level/Cues Needed (Bed Mobility Goal 1, OT)  contact guard assist  -BB      Time Frame (Bed Mobility Goal 1, OT)  long term goal (LTG)  -BB      Progress/Outcomes (Bed Mobility Goal 1, OT)  goal not met  -BB         Transfer Goal 1 (OT)    Activity/Assistive Device (Transfer Goal 1, OT)  transfers, all  -BB      [place] Level/Cues Needed (Transfer Goal 1, OT)  supervision required  -BB      Time Frame (Transfer Goal 1, OT)  long term goal (LTG)  -BB      Progress/Outcome (Transfer Goal 1, OT)  goal not met  -BB         Bathing Goal 1 (OT)    Activity/Assistive Device (Bathing Goal 1, OT)  bathing skills, all  -BB      [place] Level/Cues Needed (Bathing Goal 1, OT)  minimum assist (75% or more patient effort)  -BB      Time Frame (Bathing Goal 1, OT)  long term goal (LTG)  -BB      Progress/Outcomes (Bathing Goal 1, OT)  goal not met  -BB         Dressing Goal 1 (OT)    Activity/Assistive Device (Dressing Goal 1, OT)  dressing skills, all  -BB      [place]/Cues Needed (Dressing Goal 1, OT)  minimum assist (75% or more patient effort)  -BB      Time Frame (Dressing Goal 1, OT)  long term goal (LTG)  -BB      Progress/Outcome (Dressing Goal 1, OT)  goal not met  -BB         Patient Education Goal (OT)    Activity (Patient Education Goal, OT)  B UE HEP for increased independence with functional mobility and ADLs, EC/WS and home safety/fall prev.  -BB      [place]/Cues/Accuracy (Memory Goal 2, OT)  demonstrates adequately;verbalizes understanding  -BB      Time Frame (Patient Education Goal, OT)  long term goal (LTG)  -BB      Progress/Outcome (Patient Education Goal, OT)  goal not met  -BB        User Key  (r) = Recorded By, (t) = Taken By, (c) = Cosigned By    Initials Name Provider Type Discipline    BB  Munira Ferris COTA/L Occupational Therapy Assistant OT        Occupational Therapy Education     Title: PT OT SLP Therapies (In Progress)     Topic: Occupational Therapy (In Progress)     Point: ADL training (In Progress)     Description: Instruct learner(s) on proper safety adaptation and remediation techniques during self care or transfers.   Instruct in proper use of assistive devices.    Learning Progress Summary           Patient Acceptance, E, NR by BB at 4/4/2019  2:00 PM    Acceptance, E, NR by BB at 3/28/2019 10:44 AM                   Point: Home exercise program (In Progress)     Description: Instruct learner(s) on appropriate technique for monitoring, assisting and/or progressing therapeutic exercises/activities.    Learning Progress Summary           Patient Acceptance, E, NR by BB at 4/2/2019  1:32 PM    Acceptance, E, NR by BB at 4/1/2019  1:51 PM                   Point: Precautions (Done)     Description: Instruct learner(s) on prescribed precautions during self-care and functional transfers.    Learning Progress Summary           Patient Acceptance, E, VU,NR by TO at 3/30/2019  3:04 PM    Comment:  Pt educated on benefits of OOB and EOB to increase strength, improve circulation, and increase ax tolerance. pt declined this tx, but agree to do next date.    Acceptance, E, VU,NR by RB at 3/27/2019  4:21 PM    Comment:  Edu pt on use of gait belt and non skid socks when OOB and no OOB without assist.                               User Key     Initials Effective Dates Name Provider Type Discipline    RB 06/15/16 -  Ryley Suarez OT Occupational Therapist OT    BB 03/07/18 -  Munira Ferris COTA/L Occupational Therapy Assistant OT    TO 03/07/18 -  Alexis Tobias COTA/L Occupational Therapy Assistant OT              Non-skid socks and gait belt in place. Toileting offered. Call light and needs within reach. Pt advised to not get up alone and call the nurse for assistance.  Bed alarm on.      OT Recommendation and Plan  Outcome Summary/Treatment Plan (OT)  Daily Summary of Progress (OT): progress toward functional goals is gradual  Plan for Continued Treatment (OT): continue pOC  Anticipated Discharge Disposition (OT): skilled nursing facility  Therapy Frequency (OT Eval): other (see comments)(5-7 days/wk)  Daily Summary of Progress (OT): progress toward functional goals is gradual  Plan of Care Review  Plan of Care Reviewed With: patient  Plan of Care Reviewed With: patient  Outcome Summary: Pt confused this am, however agreed to work with OT. Pt defers EOB/OOB. Pt Min A for doffing/donning gown with verbal cues. No goals met.  Outcome Measures     Row Name 04/04/19 0825 04/03/19 1600 04/03/19 0855       How much help from another person do you currently need...    Turning from your back to your side while in flat bed without using bedrails?  --  3  -RW  --    Moving from lying on back to sitting on the side of a flat bed without bedrails?  --  3  -RW  --    Moving to and from a bed to a chair (including a wheelchair)?  --  2  -RW  --    Standing up from a chair using your arms (e.g., wheelchair, bedside chair)?  --  2  -RW  --    Climbing 3-5 steps with a railing?  --  2  -RW  --    To walk in hospital room?  --  2  -RW  --    AM-PAC 6 Clicks Score  --  14  -RW  --       How much help from another is currently needed...    Putting on and taking off regular lower body clothing?  1  -BB  --  1  -BB    Bathing (including washing, rinsing, and drying)  2  -BB  --  2  -BB    Toileting (which includes using toilet bed pan or urinal)  2  -BB  --  2  -BB    Putting on and taking off regular upper body clothing  3  -BB  --  3  -BB    Taking care of personal grooming (such as brushing teeth)  3  -BB  --  3  -BB    Eating meals  3  -BB  --  3  -BB    Score  14  -BB  --  14  -BB    Row Name 04/02/19 0852             How much help from another is currently needed...    Putting on and taking off regular lower  body clothing?  1  -BB      Bathing (including washing, rinsing, and drying)  2  -BB      Toileting (which includes using toilet bed pan or urinal)  2  -BB      Putting on and taking off regular upper body clothing  3  -BB      Taking care of personal grooming (such as brushing teeth)  3  -BB      Eating meals  3  -BB      Score  14  -BB        User Key  (r) = Recorded By, (t) = Taken By, (c) = Cosigned By    Initials Name Provider Type    RW Dustin Sanchez, PTA Physical Therapy Assistant    BB Munira Ferris COTA/L Occupational Therapy Assistant           Time Calculation:   Time Calculation- OT     Row Name 04/04/19 1440 04/04/19 1403          Time Calculation- OT    OT Start Time  1102  -BB  0825  -BB     OT Stop Time  1125  -BB  0920  -BB     OT Time Calculation (min)  23 min  -BB  55 min  -BB     Total Timed Code Minutes- OT  23 minute(s)  -BB  55 minute(s)  -BB     OT Received On  04/04/19  -BB  04/04/19  -BB       User Key  (r) = Recorded By, (t) = Taken By, (c) = Cosigned By    Initials Name Provider Type    Munira Holliday COTA/L Occupational Therapy Assistant        Therapy Charges for Today     Code Description Service Date Service Provider Modifiers Qty    55169382661 HC OT SELF CARE/MGMT/TRAIN EA 15 MIN 4/3/2019 Munira Ferris COTA/L GO 1    26384327887 HC OT SELF CARE/MGMT/TRAIN EA 15 MIN 4/4/2019 Munira Ferris COTA/L GO 2    99788025668 HC OT THER PROC EA 15 MIN 4/4/2019 Munira Ferris COTA/L GO 2    22169213238 HC OT SELF CARE/MGMT/TRAIN EA 15 MIN 4/4/2019 Munira Ferris COTA/L GO 2               AUDI Smith  4/4/2019

## 2019-04-05 NOTE — PLAN OF CARE
Problem: Patient Care Overview  Goal: Plan of Care Review  Outcome: Ongoing (interventions implemented as appropriate)   04/05/19 8444   OTHER   Outcome Summary Pt confused. Repeatedly pulling at CBI and removing o2 and gown and IV. CBI going at moderate pace, red drainage at this time. Will continue to monitor.    Coping/Psychosocial   Plan of Care Reviewed With patient;daughter   Plan of Care Review   Progress declining     Goal: Individualization and Mutuality  Outcome: Ongoing (interventions implemented as appropriate)    Goal: Discharge Needs Assessment  Outcome: Ongoing (interventions implemented as appropriate)    Goal: Interprofessional Rounds/Family Conf  Outcome: Ongoing (interventions implemented as appropriate)      Problem: Fall Risk (Adult)  Goal: Absence of Fall  Outcome: Ongoing (interventions implemented as appropriate)      Problem: Skin Injury Risk (Adult)  Goal: Skin Health and Integrity  Outcome: Ongoing (interventions implemented as appropriate)      Problem: Wound (Includes Pressure Injury) (Adult)  Goal: Signs and Symptoms of Listed Potential Problems Will be Absent, Minimized or Managed (Wound)  Outcome: Ongoing (interventions implemented as appropriate)      Problem: Chronic Obstructive Pulmonary Disease (Adult)  Goal: Signs and Symptoms of Listed Potential Problems Will be Absent, Minimized or Managed (Chronic Obstructive Pulmonary Disease)  Outcome: Ongoing (interventions implemented as appropriate)      Problem: Urinary Tract Infection (Adult)  Goal: Signs and Symptoms of Listed Potential Problems Will be Absent, Minimized or Managed (Urinary Tract Infection)  Outcome: Ongoing (interventions implemented as appropriate)

## 2019-04-05 NOTE — PROGRESS NOTES
[facility]  INPATIENT PROGRESS NOTE    Length of Stay: 10  Date of Admission: 3/26/2019  Primary Care Physician: Seth Arce MD    Subjective   Chief Complaint: Confusion.      HPI:    Seth Montiel is a 83 y.o. male with medical history significant for COPD, atrial fibrillation, porcine valve replacement, hyperlipidemia, hypertension, and history of tobacco abuse presents with cough and weakness.      Patient was seen at his primary care provider's office for follow-up after discharge from skilled nursing facility.  Patient does report a productive cough with some fatigue.  Patient was noted to have left lower lobe pneumonia on chest x-ray and a supratherapeutic INR of greater than 8.  As such patient was sent to the emergency department for further evaluation.     Patient is a frail-appearing gentleman with barrel chest noted on exam.  Patient does have chronic hypoxic respiratory failure for which he uses 4 L/min nasal cannula and at time of exam is currently on 4 L/min nasal cannula.  Patient does endorse some fatigue, weakness, productive cough but denied any fever, chills, nausea, vomiting, chest pain, or diaphoresis    He is status post removal of J stent and currently on bladder irrigation due to gross hematuria which has much improved. He remains deconditioned, short of air on exertion and remains on his baseline supplemental oxygen of 4 L nasal prongs and maintaining saturation from 92-97%.    He seems somewhat confused this morning but was able to answer most questions appropriately and follow simple commands.      Review of Systems   Constitutional: Positive for activity change, appetite change and fatigue. Negative for chills, diaphoresis and fever.   HENT: Negative for trouble swallowing and voice change.    Eyes: Negative for photophobia and visual disturbance.   Respiratory: Positive for shortness of breath. Negative for cough,  choking, chest tightness, wheezing and stridor.    Cardiovascular: Negative for chest pain, palpitations and leg swelling.   Gastrointestinal: Negative for abdominal distention, abdominal pain, blood in stool, constipation, diarrhea, nausea and vomiting.   Endocrine: Negative for cold intolerance, heat intolerance, polydipsia, polyphagia and polyuria.   Genitourinary: Negative for decreased urine volume, difficulty urinating, dysuria, enuresis, flank pain, frequency, hematuria and urgency.   Musculoskeletal: Negative for arthralgias, gait problem, myalgias, neck pain and neck stiffness.   Skin: Negative for pallor, rash and wound.   Neurological: Positive for weakness. Negative for dizziness, tremors, seizures, syncope, facial asymmetry, speech difficulty, light-headedness, numbness and headaches.   Hematological: Does not bruise/bleed easily.   Psychiatric/Behavioral: Negative for agitation, behavioral problems and confusion.       Objective    Temp:  [96.3 °F (35.7 °C)-97.3 °F (36.3 °C)] 96.3 °F (35.7 °C)  Heart Rate:  [] 84  Resp:  [18-22] 18  BP: (103-122)/(46-58) 104/46    Physical Exam   Constitutional: He is oriented to person, place, and time. He appears well-developed and well-nourished. No distress.   HENT:   Head: Normocephalic and atraumatic.   Eyes: EOM are normal. Pupils are equal, round, and reactive to light. No scleral icterus.   Neck: Normal range of motion. Neck supple. No JVD present. No thyromegaly present.   Cardiovascular: Normal rate and normal heart sounds. An irregularly irregular rhythm present. Exam reveals no gallop and no friction rub.   No murmur heard.  Pulmonary/Chest: Effort normal. He has decreased breath sounds. He has no wheezes. He has rhonchi. He has no rales. He exhibits no tenderness.   Abdominal: Soft. Bowel sounds are normal. He exhibits no distension and no mass. There is no tenderness. There is no rebound and no guarding.   Genitourinary:   Genitourinary Comments:  Arita catheter is in place gross hematuria has essentially resolved.   Musculoskeletal: He exhibits edema. He exhibits no tenderness or deformity.   Neurological: He is alert and oriented to person, place, and time. No cranial nerve deficit. He exhibits normal muscle tone. Coordination normal.   Skin: Skin is warm and dry. No rash noted. He is not diaphoretic. No erythema. No pallor.   He has chronic venous stasis changes both legs.   Psychiatric: He has a normal mood and affect. His behavior is normal. Judgment and thought content normal.   Nursing note and vitals reviewed.        Medication Review:    Current Facility-Administered Medications:   •  albuterol (PROVENTIL) nebulizer solution 0.083% 2.5 mg/3mL, 2.5 mg, Nebulization, Q6H PRN, IdemZina MD, 2.5 mg at 04/03/19 0428  •  atorvastatin (LIPITOR) tablet 10 mg, 10 mg, Oral, Nightly, Guy Evans MD, 10 mg at 04/04/19 2129  •  bacitracin ointment, , Topical, Daily, Guy Evans MD  •  bisacodyl (DULCOLAX) suppository 10 mg, 10 mg, Rectal, Daily PRN, Austin Chaudhry MD, 10 mg at 04/02/19 1458  •  cefepime (MAXIPIME) 2 g/100 mL 0.9% NS (mbp), 2 g, Intravenous, Q12H, Guy Evans MD, Last Rate: 200 mL/hr at 04/05/19 0432, 2 g at 04/05/19 0432  •  dextrose (D5W) 5 % infusion, 75 mL/hr, Intravenous, Continuous, Austin Chaudhry MD, Last Rate: 75 mL/hr at 04/05/19 1013, 75 mL/hr at 04/05/19 1013  •  diltiaZEM CD (CARDIZEM CD) 24 hr capsule 240 mg, 240 mg, Oral, Nightly, Guy Evans MD, 240 mg at 04/04/19 2130  •  ferrous sulfate EC tablet 324 mg, 324 mg, Oral, BID With Meals, Guy Evans MD, 324 mg at 04/05/19 0838  •  flecainide (TAMBOCOR) tablet 50 mg, 50 mg, Oral, Q12H, Guy Evans MD, 50 mg at 04/05/19 0838  •  folic acid (FOLVITE) tablet 1 mg, 1 mg, Oral, Daily, Guy Evans MD, 1 mg at 04/05/19 0838  •  ipratropium-albuterol (DUO-NEB) nebulizer solution 3 mL, 3 mL, Nebulization, 4x Daily - RT, Guy Evans MD, 3 mL at 04/05/19  1054  •  levothyroxine (SYNTHROID, LEVOTHROID) tablet 50 mcg, 50 mcg, Oral, QAM AC, Guy Evans MD, 50 mcg at 04/05/19 0837  •  lidocaine (XYLOCAINE) 2 % jelly, , , PRN, [place], Holland HOLBROOK MD, 10 mL at 04/02/19 1901  •  lidocaine (XYLOCAINE) 2 % jelly, , Urethral, Once, [place], Holland HOLBROOK MD  •  magic butt ointment, , Topical, BID, Guy Evans MD  •  melatonin tablet 6 mg, 6 mg, Oral, Nightly PRN, Guy Evans MD, 6 mg at 04/02/19 0024  •  sodium chloride 0.9 % flush 10 mL, 10 mL, Intravenous, PRN, Demar Finch MD, 10 mL at 04/02/19 1015  •  sodium chloride 0.9 % flush 3 mL, 3 mL, Intravenous, Q12H, Guy Evans MD, 3 mL at 04/05/19 0838  •  sodium chloride 0.9 % flush 3-10 mL, 3-10 mL, Intravenous, PRN, Guy Evans MD, 10 mL at 03/29/19 1710  •  tamsulosin (FLOMAX) 24 hr capsule 0.4 mg, 0.4 mg, Oral, Nightly, Guy Evans MD, 0.4 mg at 04/04/19 2130  •  vitamin B-12 (CYANOCOBALAMIN) tablet 1,000 mcg, 1,000 mcg, Oral, Daily, Guy Evans MD, 1,000 mcg at 04/05/19 0838    Results Review:  I have reviewed the labs, radiology results, and diagnostic studies.    Laboratory Data:   Results from last 7 days   Lab Units 04/05/19  0810 04/04/19  0712 04/03/19  1948   SODIUM mmol/L 144 145 146*   POTASSIUM mmol/L 3.7 4.2 4.2   CHLORIDE mmol/L 94* 97* 97*   CO2 mmol/L 37.0* 37.0* 39.0*   BUN mg/dL 51* 47* 44*   CREATININE mg/dL 2.12* 1.80* 1.60*   GLUCOSE mg/dL 76 96 88   CALCIUM mg/dL 9.4 9.5 9.2   BILIRUBIN mg/dL  --   --  0.4   ALK PHOS U/L  --   --  75   ALT (SGPT) U/L  --   --  10   AST (SGOT) U/L  --   --  17   ANION GAP mmol/L 13.0 11.0 10.0     Estimated Creatinine Clearance: 25.2 mL/min (A) (by C-G formula based on SCr of 2.12 mg/dL (H)).          Results from last 7 days   Lab Units 04/05/19  0847 04/04/19  0712 04/03/19  1948 04/03/19  1519 04/03/19  0547 04/02/19  0614   WBC 10*3/mm3 10.83* 12.86* 13.04*  --  14.44* 10.27   HEMOGLOBIN g/dL 7.0* 7.8* 7.4* 7.4* 7.3* 7.6*   HEMATOCRIT % 22.5*  25.0* 23.9* 24.5* 23.9* 24.7*   PLATELETS 10*3/mm3 227 260 240  --  211 206     Results from last 7 days   Lab Units 04/05/19  0810 04/04/19  0734 04/03/19  0547 04/02/19  0614 04/01/19  0609   INR  3.38* 3.05* 2.84* 2.66* 2.75*       Culture Data:   Blood Culture   Date Value Ref Range Status   04/03/2019 No growth at 24 hours  Preliminary   04/03/2019 No growth at 24 hours  Preliminary     Urine Culture   Date Value Ref Range Status   04/03/2019 No growth  Final     No results found for: RESPCX  No results found for: WOUNDCX  No results found for: STOOLCX  No components found for: BODYFLD    Radiology Data:   Imaging Results (last 24 hours)     Procedure Component Value Units Date/Time    XR Chest 1 View [[id number]] Collected:  04/05/19 0529     Updated:  04/05/19 0557    Narrative:       Exam: AP portable chest    INDICATION: Follow-up pneumonia, shortness of breath    COMPARISON: 4/24/2019    FINDINGS: The bony structures are intact. The cardiomediastinal  silhouette is unremarkable. Plaque is present in the aorta.  Status post CABG. There is mild pulmonary venous congestion.  Chronic parenchymal changes are present bilaterally. There is a  questionable superimposed infiltrate in the left lung. A small  left pleural effusion present      Impression:       Findings suspicious for mild infiltration in the left  lung superimposed on chronic parenchymal changes. Recommend  follow-up as clinically warranted    Electronically signed by:  Collin Peter MD  4/5/2019 5:56 AM CDT  Workstation: [id number]          I have reviewed the patient's current medications.     Assessment/Plan     Hospital Problem List:  Principal Problem:    Pneumonia of left lower lobe due to infectious organism (CMS/HCC)  Active Problems:    Atrial fibrillation [I48.91]    Long-term (current) use of anticoagulants    COPD (chronic obstructive pulmonary disease) (CMS/HCC)    Hypertension    Hyperlipidemia    -Left lower lobe pneumonia with  Pseudomonas bacteremia: Patient was initially on meropenem and has been transitioned to cefepime.  Repeat chest x-ray in a.m.    Acute cystitis: Urine culture is positive for Pseudomonas.  Result of repeat urinalysis done 4/3/2019 and has been reviewed.  Continue cefepime.    -Acute exacerbation of COPD: Improved as patient is back to his baseline supplemental oxygen.  Continue supplemental oxygen, bronchodilators and steroids.    -Chronic atrial fibrillation: Patient is in controlled ventricular response.  Continue rate control.  Anticoagulation is on hold for now due to gross hematuria.       -History of left-sided hydronephrosis status post J stent placement: Continue Flomax.  Patient is status post removal of J stent on 4/2/2019.  He has had gross hematuria which has essentially resolved with CBI and ongoing bladder irrigation. Urologist is following.      -History of severe aortic stenosis: Patient is on routine surveillance by his primary cardiologist.  Echocardiogram done on 3/27/2019 showed:  · Left atrial cavity size is moderate-to-severely dilated.  · Severe aortic valve stenosis is present.  · Mild mitral valve regurgitation is present  · Mild tricuspid valve regurgitation is present.  · Estimated EF = 65%.  · Left ventricular systolic function is normal.  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    -Hypernatremia: Resolved.         -Chronic kidney disease stage III:  Patient's baseline creatinine is between 1.4-1.65.  Creatinine has increased to 2.12 today.  Will discontinue Lasix, check renal ultrasound,  continue to monitor renal function, avoid nephrotoxins and consult nephrologist if the need arises.    -Anemia of chronic inflammation: Hemoglobin down to 7.0 today.  We will transfuse 1 unit of packed red blood cells.  Risks and benefits of blood transfusion have been  discussed with the patient's daughter and next of kin.  Continue iron supplementation and routine monitoring of hemoglobin..    -Dependent edema both legs with chronic stasis changes: Resolved.      Deconditioning: Continue PT and OT.    Discharge Planning: In progress.    Austin Chaudhry MD   04/05/19   11:09 AM

## 2019-04-05 NOTE — PLAN OF CARE
Problem: Patient Care Overview  Goal: Plan of Care Review  Outcome: Ongoing (interventions implemented as appropriate)   04/05/19 1235   OTHER   Outcome Summary Pt very confused and requiring max encouragement to work with therapy. Pt finally agreed to bath 2' to having food on gown. Pt dependent for UB/LB ADL and Mod A for combing hair, washing face and hands.Tx required increased time 2' to having to encourage pt to participate and allow clothes, linens to be changed.Pt with UB rigid and difficult to perform tasks.    Coping/Psychosocial   Plan of Care Reviewed With patient   Plan of Care Review   Progress declining

## 2019-04-05 NOTE — THERAPY TREATMENT NOTE
Acute Care - Occupational Therapy Treatment Note  [facility]     Patient Name: Seth Montiel  : 1935  MRN: [id number]  Today's Date: 2019  Onset of Illness/Injury or Date of Surgery: 19  Date of Referral to OT: 19  Referring Physician: ERNA Curran MD    Admit Date: 3/26/2019       ICD-10-CM ICD-9-CM   1. Pneumonia of left lower lobe due to infectious organism (CMS/HCC) J18.1 486   2. Poisoning by warfarin sodium, accidental or unintentional, initial encounter T45.511A 964.2     E858.2   3. Impaired functional mobility, balance, gait, and endurance Z74.09 V49.89   4. Impaired mobility and activities of daily living Z74.09 799.89   5. Dysphagia, unspecified type R13.10 787.20   6. Hydronephrosis N13.30 591     Patient Active Problem List   Diagnosis   • Atrial fibrillation [I48.91]   • Long-term (current) use of anticoagulants   • Nonrheumatic aortic valve disorder, unspecified   • Atrial fibrillation (CMS/HCC)   • COPD (chronic obstructive pulmonary disease) (CMS/HCC)   • SOB (shortness of breath)   • Aortic valve disorder   • Hematuria   • Hydronephrosis   • Pneumonia of left lower lobe due to infectious organism (CMS/HCC)   • Hypertension   • Hyperlipidemia     Past Medical History:   Diagnosis Date   • Aortic valve disorder    • Atrial fibrillation (CMS/HCC)    • COPD (chronic obstructive pulmonary disease) (CMS/HCC)    • Hyperlipidemia    • Hypertension    • Nonrheumatic aortic valve disorder, unspecified    • SOB (shortness of breath)      Past Surgical History:   Procedure Laterality Date   • AORTIC VALVE SURGERY     • APPENDECTOMY     • CARDIAC SURGERY     • CYSTOSCOPY, URETEROSCOPY, RETROGRADE PYELOGRAM, STENT INSERTION Left 2019    Procedure: CYSTOSCOPY, LEFT RETROGRADE, URETEROSCOPY, LASER LITHOTRIPSY, STENT PLACEMENT;  Surgeon: Holland Johnson MD;  Location: [facility];  Service: Urology   • CYSTOSCOPY, URETEROSCOPY, RETROGRADE PYELOGRAM, STENT INSERTION Left  4/2/2019    Procedure: CYSTOSCOPY, LEFT RETROGRADE,  URETEROSCOPY,  STENT  REMOVAL;  Surgeon: Holland Johnson MD;  Location: [facility];  Service: Urology   • GALLBLADDER SURGERY     • ROTATOR CUFF REPAIR         Therapy Treatment    Rehabilitation Treatment Summary     Row Name 04/05/19 0825             Treatment Time/Intention    Discipline  occupational therapy assistant  -BB      Document Type  therapy note (daily note)  -BB      Subjective Information  no complaints  -BB      Mode of Treatment  individual therapy;occupational therapy  -BB      Total Minutes, Occupational Therapy Treatment  69  -BB      Therapy Frequency (OT Eval)  other (see comments) 5-7 days/wk  -BB      Patient Effort  poor  -BB      Comment  Pt is very confused this am  -BB      Existing Precautions/Restrictions  fall;oxygen therapy device and L/min  -BB      Recorded by [BB] Munira Ferris COTA/L 04/05/19 1343      Row Name 04/05/19 0825             Vital Signs    Pretreatment Heart Rate (beats/min)  73  -BB      Posttreatment Heart Rate (beats/min)  76  -BB      Pre SpO2 (%)  90  -BB      O2 Delivery Pre Treatment  supplemental O2  -BB      Post SpO2 (%)  92  -BB      O2 Delivery Post Treatment  supplemental O2  -BB      Pre Patient Position  Supine  -BB      Post Patient Position  Supine  -BB      Recorded by [BB] Munira Ferris COTA/L 04/05/19 1343      Row Name 04/05/19 0825             Cognitive Assessment/Intervention- PT/OT    Orientation Status (Cognition)  oriented to;person  -BB      Follows Commands (Cognition)  does not follow one step commands  -BB      Recorded by [BB] Munira Ferris COTA/L 04/05/19 1343      Row Name 04/05/19 0825             Bathing Assessment/Intervention    Bathing Aroda Level  upper body;lower body;set up;dependent (less than 25% patient effort);verbal cues  -BB      Bathing Position  long sitting  -BB      Recorded by [BB] Munira Ferris COTA/L 04/05/19 1343      Row Name  04/05/19 0825             Upper Body Dressing Assessment/Training    Upper Body Dressing Yell Level  doff;don;set up;moderate assist (50% patient effort) hospital gown  -BB      Upper Body Dressing Position  long sitting  -BB      Recorded by [BB] Munira Ferris COTA/L 04/05/19 1343      Row Name 04/05/19 0825             Lower Body Dressing Assessment/Training    Lower Body Dressing Yell Level  socks;don;doff;dependent (less than 25% patient effort) Pt wanted socks on and then wanted them off  -BB      Lower Body Dressing Position  long sitting  -BB      Recorded by [BB] Munira Ferris COTA/L 04/05/19 1343      Row Name 04/05/19 0825             Grooming Assessment/Training    Yell Level (Grooming)  hair care, combing/brushing;wash face, hands;set up;verbal cues;nonverbal cues (demo/gesture);moderate assist (50% patient effort)  -BB      Grooming Position  long sitting  -BB      Recorded by [BB] Munira Ferris COTA/L 04/05/19 1343      Row Name 04/05/19 0825             Positioning and Restraints    Pre-Treatment Position  in bed  -BB      Post Treatment Position  bed  -BB      In Bed  supine;call light within reach;encouraged to call for assist;exit alarm on  -BB      Recorded by [BB] Munira Ferris COTA/L 04/05/19 1343      Row Name 04/05/19 0825             Pain Scale: Numbers Pre/Post-Treatment    Pain Scale: Numbers, Pretreatment  0/10 - no pain  -BB      Pain Scale: Numbers, Post-Treatment  0/10 - no pain  -BB      Recorded by [BB] Munira Ferris COTA/L 04/05/19 1343      Row Name                Wound 03/26/19 2220 coccyx pressure injury    Wound - Properties Group Date first assessed: 03/26/19 [CN] Time first assessed: 2220 [CN] Present On Admission : yes;picture taken [CN] Location: coccyx [CN] Type: pressure injury [CN] Stage, Pressure Injury: Stage 2 [CN] Recorded by:  [CN] Roland Simpson RN 03/26/19 2241    Row Name                Wound 03/26/19 2220  Left lower gluteal pressure injury    Wound - Properties Group Date first assessed: 03/26/19 [CN] Time first assessed: 2220 [CN] Side: Left [CN] Orientation: lower [CN] Location: gluteal [CN] Type: pressure injury [CN] Stage, Pressure Injury: Stage 2 [CN] Recorded by:  [CN] Roland Simpson RN 03/26/19 2242    Row Name                Wound 03/27/19 1420 Left lower arm skin tear    Wound - Properties Group Date first assessed: 03/27/19 [BL] Time first assessed: 1420 [BL] Present On Admission : yes [BL] Side: Left [BL] Orientation: lower [BL] Location: arm [BL] Type: skin tear [BL] Recorded by:  [BL] Chelsea Chaves RN 03/27/19 1503    Row Name 04/05/19 0825             Plan of Care Review    Plan of Care Reviewed With  patient  -BB      Recorded by [BB] Munira Ferris COTA/L 04/05/19 1343      Row Name 04/05/19 0825             Outcome Summary/Treatment Plan (OT)    Daily Summary of Progress (OT)  progress toward functional goals is gradual  -BB      Plan for Continued Treatment (OT)  continue POC  -BB      Anticipated Discharge Disposition (OT)  skilled nursing facility  -BB      Recorded by [BB] Munira Ferris COTA/L 04/05/19 1343        User Key  (r) = Recorded By, (t) = Taken By, (c) = Cosigned By    Initials Name Effective Dates Discipline    BB Munira Ferris COTA/L 03/07/18 -  OT    BL Chelsea Chaves RN 07/26/16 -  Nurse    CN Roland Simpson RN 10/17/16 -  Nurse        Wound 03/26/19 2220 coccyx pressure injury (Active)   Dressing Appearance open to air 4/5/2019  8:28 AM   Base red;granulating 4/5/2019  8:28 AM       Wound 03/26/19 2220 Left lower gluteal pressure injury (Active)   Dressing Appearance open to air 4/5/2019  8:28 AM   Base red;granulating 4/5/2019  8:28 AM       Wound 03/27/19 1420 Left lower arm skin tear (Active)   Dressing Appearance open to air 4/5/2019  8:28 AM   Base dry;pink;scab 4/5/2019  8:28 AM       Occupational Therapy Education     Title: PT OT SLP  Therapies (In Progress)     Topic: Occupational Therapy (In Progress)     Point: ADL training (In Progress)     Description: Instruct learner(s) on proper safety adaptation and remediation techniques during self care or transfers.   Instruct in proper use of assistive devices.    Learning Progress Summary           Patient Acceptance, E, NR by BB at 4/4/2019  2:00 PM    Acceptance, E, NR by BB at 3/28/2019 10:44 AM                   Point: Home exercise program (In Progress)     Description: Instruct learner(s) on appropriate technique for monitoring, assisting and/or progressing therapeutic exercises/activities.    Learning Progress Summary           Patient Acceptance, E, NR by BB at 4/2/2019  1:32 PM    Acceptance, E, NR by BB at 4/1/2019  1:51 PM                   Point: Precautions (Done)     Description: Instruct learner(s) on prescribed precautions during self-care and functional transfers.    Learning Progress Summary           Patient Acceptance, E, VU,NR by TO at 3/30/2019  3:04 PM    Comment:  Pt educated on benefits of OOB and EOB to increase strength, improve circulation, and increase ax tolerance. pt declined this tx, but agree to do next date.    Acceptance, E, VU,NR by RB at 3/27/2019  4:21 PM    Comment:  Edu pt on use of gait belt and non skid socks when OOB and no OOB without assist.                               User Key     Initials Effective Dates Name Provider Type Discipline     06/15/16 -  Ryley Suarez OT Occupational Therapist OT    BB 03/07/18 -  Munira Ferris COTA/L Occupational Therapy Assistant OT    TO 03/07/18 -  Alexis Tobias COTA/L Occupational Therapy Assistant OT                OT Recommendation and Plan  Outcome Summary/Treatment Plan (OT)  Daily Summary of Progress (OT): progress toward functional goals is gradual  Plan for Continued Treatment (OT): continue POC  Anticipated Discharge Disposition (OT): skilled nursing facility  Therapy Frequency (OT Eval): other  (see comments)(5-7 days/wk)  Daily Summary of Progress (OT): progress toward functional goals is gradual  Plan of Care Review  Plan of Care Reviewed With: patient  Plan of Care Reviewed With: patient  Outcome Summary: Pt very confused and requiring max encouragement to work with therapy. Pt finally agreed to bath 2' to having food on gown. Pt dependent for UB/LB ADL and Mod A for combing hair, washing face and hands.Tx required increased time 2' to having to encourage pt to participate and allow clothes, linens to be changed.Pt with UB rigid and difficult to perform tasks.     Outcome Measures     Row Name 04/05/19 0825 04/04/19 0825 04/03/19 1600       How much help from another person do you currently need...    Turning from your back to your side while in flat bed without using bedrails?  --  --  3  -RW    Moving from lying on back to sitting on the side of a flat bed without bedrails?  --  --  3  -RW    Moving to and from a bed to a chair (including a wheelchair)?  --  --  2  -RW    Standing up from a chair using your arms (e.g., wheelchair, bedside chair)?  --  --  2  -RW    Climbing 3-5 steps with a railing?  --  --  2  -RW    To walk in hospital room?  --  --  2  -RW    AM-PAC 6 Clicks Score  --  --  14  -RW       How much help from another is currently needed...    Putting on and taking off regular lower body clothing?  1  -BB  1  -BB  --    Bathing (including washing, rinsing, and drying)  1  -BB  2  -BB  --    Toileting (which includes using toilet bed pan or urinal)  1  -BB  2  -BB  --    Putting on and taking off regular upper body clothing  2  -BB  3  -BB  --    Taking care of personal grooming (such as brushing teeth)  2  -BB  3  -BB  --    Eating meals  3  -BB  2  -BB  --    Score  10  -BB  13  -BB  --    Row Name 04/03/19 0855             How much help from another is currently needed...    Putting on and taking off regular lower body clothing?  1  -BB      Bathing (including washing, rinsing, and  drying)  2  -BB      Toileting (which includes using toilet bed pan or urinal)  2  -BB      Putting on and taking off regular upper body clothing  3  -BB      Taking care of personal grooming (such as brushing teeth)  3  -BB      Eating meals  3  -BB      Score  14  -BB        User Key  (r) = Recorded By, (t) = Taken By, (c) = Cosigned By    Initials Name Provider Type    RW Dustin Sanchez, PTA Physical Therapy Assistant    Munira Holliday COTA/L Occupational Therapy Assistant         Non-skid socks and gait belt in place. Toileting offered. Call light and needs within reach. Pt advised to not get up alone and call the nurse for assistance.  Bed alarm on.     Time Calculation:   Time Calculation- OT     Row Name 04/05/19 1352             Time Calculation- OT    OT Start Time  0826  -BB      OT Stop Time  0935  -BB      OT Time Calculation (min)  69 min  -BB      Total Timed Code Minutes- OT  69 minute(s)  -BB      OT Received On  04/05/19  -BB        User Key  (r) = Recorded By, (t) = Taken By, (c) = Cosigned By    Initials Name Provider Type    Munira Holliday COTA/L Occupational Therapy Assistant        Therapy Charges for Today     Code Description Service Date Service Provider Modifiers Qty    13424913416 HC OT SELF CARE/MGMT/TRAIN EA 15 MIN 4/4/2019 Munira Ferris COTA/L GO 2    15225827600 HC OT THER PROC EA 15 MIN 4/4/2019 Munira Ferris COTA/L GO 2    82815030376 HC OT SELF CARE/MGMT/TRAIN EA 15 MIN 4/4/2019 Munira Ferris COTA/L GO 2    25237165861 HC OT SELF CARE/MGMT/TRAIN EA 15 MIN 4/5/2019 Munira Ferris COTA/L GO 5               AUID Smith  4/5/2019

## 2019-04-05 NOTE — SIGNIFICANT NOTE
04/05/19 1637   Rehab Treatment   Discipline physical therapy assistant   Reason Treatment Not Performed unavailable for treatment   pt getting blood and with decreased mental status

## 2019-04-05 NOTE — PROGRESS NOTES
"   LOS: 10 days   Patient Care Team:  Seth Arce MD as PCP - General  Savanna, LIZET Douglas as PCP - Claims Attributed    Subjective     Subjective:  Symptoms:  He reports malaise, cough and weakness.  No shortness of breath.  (Urine pale pink with CBI slowly running. He is alert to person, place but confused to time and situation. His granddaughter is present and he recognizes her easily. He states he feels confused. Some resistance to my exam but reorients to who I am as well. He denies pain. ).    Diet:  No nausea or vomiting.    Activity level: Impaired due to weakness.    Pain:  He reports no pain.        History taken from: patient chart RN    Objective     Vital Signs  Temp:  [95.8 °F (35.4 °C)-97.3 °F (36.3 °C)] 96 °F (35.6 °C)  Heart Rate:  [] 85  Resp:  [16-22] 16  BP: (103-122)/(46-58) 112/58    Objective:  General Appearance:  In no acute distress.    Vital signs: (most recent): Blood pressure 112/58, pulse 85, temperature 96 °F (35.6 °C), temperature source Axillary, resp. rate 16, height 175.3 cm (69\"), weight 66.2 kg (145 lb 14.4 oz), SpO2 94 %.  Vital signs are normal.  No fever.    Output: Producing urine (Artia pale pink urine no clots).    HEENT: Normal HEENT exam.    Lungs:  Normal effort and normal respiratory rate.  There are decreased breath sounds.    Heart: Normal rate.  Regular rhythm.  S1 normal and S2 normal.  Positive for murmur.    Chest: Symmetric chest wall expansion.   Abdomen: Abdomen is soft and non-distended.  Bowel sounds are normal.   There is no abdominal tenderness.     Extremities: Normal range of motion.    Pulses: Distal pulses are intact.    Neurological: Patient is alert and oriented to person, place and time.  GCS score is 15.    Pupils:  Pupils are equal, round, and reactive to light.    Skin:  Warm, dry and pale.  No rash, ecchymosis or cyanosis.             Results Review:    Lab Results (last 24 hours)     Procedure Component Value Units Date/Time    " Manual Differential [[id number]]  (Abnormal) Collected:  04/05/19 0847    Specimen:  Blood Updated:  04/05/19 1013     Neutrophil % 91.0 %      Lymphocyte % 4.0 %      Monocyte % 3.0 %      Eosinophil % 1.0 %      Basophil % 1.0 %      Neutrophils Absolute 9.86 10*3/mm3      Lymphocytes Absolute 0.43 10*3/mm3      Monocytes Absolute 0.32 10*3/mm3      Eosinophils Absolute 0.11 10*3/mm3      Basophils Absolute 0.11 10*3/mm3      Anisocytosis Slight/1+     WBC Morphology Normal     Platelet Morphology Normal    Urine Culture - Urine, Urine, Catheter [[id number]]  (Normal) Collected:  04/03/19 1925    Specimen:  Urine, Catheter Updated:  04/05/19 0932     Urine Culture No growth    CBC & Differential [[id number]] Collected:  04/05/19 0847    Specimen:  Blood Updated:  04/05/19 0913    Narrative:       The following orders were created for panel order CBC & Differential.  Procedure                               Abnormality         Status                     ---------                               -----------         ------                     CBC Auto Differential[[id number]]        Abnormal            Final result                 Please view results for these tests on the individual orders.    CBC Auto Differential [[id number]]  (Abnormal) Collected:  04/05/19 0847    Specimen:  Blood Updated:  04/05/19 0913     WBC 10.83 10*3/mm3      RBC 2.51 10*6/mm3      Hemoglobin 7.0 g/dL      Hematocrit 22.5 %      MCV 89.6 fL      MCH 27.9 pg      MCHC 31.1 g/dL      RDW 16.7 %      RDW-SD 54.4 fl      MPV 10.6 fL      Platelets 227 10*3/mm3     Protime-INR [[id number]]  (Abnormal) Collected:  04/05/19 0810    Specimen:  Blood Updated:  04/05/19 0846     Protime 32.5 Seconds      INR 3.38    Narrative:       Therapeutic range for most indications is 2.0-3.0 INR,  or 2.5-3.5 for mechanical heart valves.    Basic Metabolic Panel [[id number]]  (Abnormal) Collected:  04/05/19 0810    Specimen:  Blood Updated:  04/05/19 0838     Glucose 76  mg/dL      BUN 51 mg/dL      Creatinine 2.12 mg/dL      Sodium 144 mmol/L      Potassium 3.7 mmol/L      Chloride 94 mmol/L      CO2 37.0 mmol/L      Calcium 9.4 mg/dL      eGFR Non African Amer 30 mL/min/1.73      BUN/Creatinine Ratio 24.1     Anion Gap 13.0 mmol/L     Narrative:       GFR Normal >60  Chronic Kidney Disease <60  Kidney Failure <15    Blood Culture - Blood, Arm, Right [[id number]] Collected:  04/03/19 1938    Specimen:  Blood from Arm, Right Updated:  04/04/19 2000     Blood Culture No growth at 24 hours    Blood Culture - Blood, Arm, Left [[id number]] Collected:  04/03/19 1948    Specimen:  Blood from Arm, Left Updated:  04/04/19 2000     Blood Culture No growth at 24 hours         Imaging Results (last 24 hours)     Procedure Component Value Units Date/Time    US Renal Bilateral [[id number]] Updated:  04/05/19 1248    XR Chest 1 View [[id number]] Collected:  04/05/19 0529     Updated:  04/05/19 0557    Narrative:       Exam: AP portable chest    INDICATION: Follow-up pneumonia, shortness of breath    COMPARISON: 4/24/2019    FINDINGS: The bony structures are intact. The cardiomediastinal  silhouette is unremarkable. Plaque is present in the aorta.  Status post CABG. There is mild pulmonary venous congestion.  Chronic parenchymal changes are present bilaterally. There is a  questionable superimposed infiltrate in the left lung. A small  left pleural effusion present      Impression:       Findings suspicious for mild infiltration in the left  lung superimposed on chronic parenchymal changes. Recommend  follow-up as clinically warranted    Electronically signed by:  Collin Peter MD  4/5/2019 5:56 AM CDT  Workstation: [id number]           I reviewed the patient's new clinical results.  I reviewed the patient's new imaging results and agree with the interpretation.  I reviewed the patient's other test results and agree with the interpretation      Assessment/Plan       Pneumonia of left lower lobe  due to infectious organism (CMS/[facility])    Atrial fibrillation [I48.91]    Long-term (current) use of anticoagulants    COPD (chronic obstructive pulmonary disease) (CMS/HCC)    Hydronephrosis    Hypertension    Hyperlipidemia      Assessment & Plan    1. Bacteremia  2. UTI cystitis   3. History of left hydronephrosis with J-stent in place  4. Gross hematuria no clots post op/post Arita placement-->improving with CBI  -2/28/19 cystoscopy left ureteroscopy (findings: ), J-stent placed--pathology is negative for neoplasm  -4/2/19 cystoscopy and left J-stent removal  -Arita anchored 4/3/19 due to retention, CBI running now    -WBC 10.83  -Lactate 0.7  -Estimated Creatinine Clearance: 25.2 mL/min (A) (by C-G formula based on SCr of 2.12 mg/dL (H)).   -Cr 1.46-->1.51-->1.33-->1.30-->1.32-->1.29-->1.43-->1.8-->2.12  -INR 2.84-->3.05-->3.38 (warfarin on hold)  -Hgb/Hct 7.8/25.0-->7.0/22.5  -3/26/19 urine and blood cultures positive for Pseudomonas aeruginosa  -Repeat urine culture 4/3/19 negative and blood cultures in progress   -Cefepime day #10  -Flomax      Plan:   Continue Flomax and antibiotic, await cultures  Continue Arita with CBI, will titrate until discontinued  Monitor I&O, labs  Renal ultrasound today.    LIZET Walters  04/05/19  2:02 PM

## 2019-04-06 NOTE — SIGNIFICANT NOTE
19 1519   Rehab Treatment   Discipline physical therapy assistant   Reason Treatment Not Performed unable to treat, medical status change  (Upon entry pt was able to recall Name and . VS was obtained BP: 124/49, HR: 78, O2 Ranging from 61-79%-nsg immediately notified and increased O2. Tx was deferred due to significantly low O2 sats and pt status. )

## 2019-04-06 NOTE — PROGRESS NOTES
[facility]  INPATIENT PROGRESS NOTE    Length of Stay: 11  Date of Admission: 3/26/2019  Primary Care Physician: Seth Arce MD    Subjective   Chief Complaint: Confusion.  HPI:    Seth Montiel is a 83 y.o. male with medical history significant for COPD, atrial fibrillation, porcine valve replacement, hyperlipidemia, hypertension, and history of tobacco abuse presents with cough and weakness.      Patient was seen at his primary care provider's office for follow-up after discharge from skilled nursing facility.  Patient does report a productive cough with some fatigue.  Patient was noted to have left lower lobe pneumonia on chest x-ray and a supratherapeutic INR of greater than 8.  As such patient was sent to the emergency department for further evaluation.     Patient is a frail-appearing gentleman with barrel chest noted on exam.  Patient does have chronic hypoxic respiratory failure for which he uses 4 L/min nasal cannula and at time of exam is currently on 4 L/min nasal cannula.  Patient does endorse some fatigue, weakness, productive cough but denied any fever, chills, nausea, vomiting, chest pain, or diaphoresis     He is status post removal of J stent and currently on bladder irrigation due to gross hematuria which has much improved. He remains deconditioned, short of air on exertion and remains on his baseline supplemental oxygen of 4 L nasal prongs and maintaining saturation from 92-97%.     Patient is pleasantly confused and could not even recognize his daughter.    Review of Systems  Unable to review due to confusion.  Objective    Temp:  [95.8 °F (35.4 °C)-97.9 °F (36.6 °C)] 96.6 °F (35.9 °C)  Heart Rate:  [72-95] 74  Resp:  [12-18] 18  BP: (104-132)/(44-67) 116/54    Physical Exam   Constitutional: He appears well-developed. He appears cachectic. No distress.   HENT:   Head: Normocephalic and atraumatic.   Eyes: No scleral  icterus.   Neck: Neck supple. No JVD present. No thyromegaly present.   Cardiovascular: Normal rate. An irregularly irregular rhythm present. Exam reveals no gallop and no friction rub.   Murmur heard.  Pulmonary/Chest: Effort normal and breath sounds normal. He has no wheezes. He has no rales. He exhibits no tenderness.   Abdominal: Soft. Bowel sounds are normal. He exhibits no distension and no mass. There is no tenderness. There is no rebound and no guarding.   Musculoskeletal: He exhibits no edema, tenderness or deformity.   Neurological:   Patient is pleasantly confused.   Skin: Skin is warm and dry. No rash noted. He is not diaphoretic. No erythema. No pallor.   He has chronic stasis changes on both legs.   Nursing note and vitals reviewed.        Medication Review:    Current Facility-Administered Medications:   •  albuterol (PROVENTIL) nebulizer solution 0.083% 2.5 mg/3mL, 2.5 mg, Nebulization, Q6H PRN, IdeZina MD, 2.5 mg at 04/03/19 0428  •  atorvastatin (LIPITOR) tablet 10 mg, 10 mg, Oral, Nightly, Guy Evans MD, 10 mg at 04/05/19 2050  •  bacitracin ointment, , Topical, Daily, Guy Evans MD  •  bisacodyl (DULCOLAX) suppository 10 mg, 10 mg, Rectal, Daily PRN, Austin Chaudhry MD, 10 mg at 04/02/19 1458  •  cefepime (MAXIPIME) 2 g/100 mL 0.9% NS (mbp), 2 g, Intravenous, Q12H, Guy Evnas MD, Last Rate: 200 mL/hr at 04/06/19 0308, 2 g at 04/06/19 0308  •  dextrose (D5W) 5 % infusion, 75 mL/hr, Intravenous, Continuous, Austin Chaudhry MD, Last Rate: 75 mL/hr at 04/06/19 0311, 75 mL/hr at 04/06/19 0311  •  diltiaZEM CD (CARDIZEM CD) 24 hr capsule 240 mg, 240 mg, Oral, Nightly, Guy Evans MD, 240 mg at 04/05/19 2050  •  ferrous sulfate EC tablet 324 mg, 324 mg, Oral, BID With Meals, Guy Evans MD, 324 mg at 04/05/19 1838  •  flecainide (TAMBOCOR) tablet 50 mg, 50 mg, Oral, Q12H, Guy Evans MD, 50 mg at 04/05/19 2050  •  folic acid (FOLVITE) tablet 1 mg, 1 mg, Oral,  Daily, Guy Evans MD, 1 mg at 04/05/19 0838  •  ipratropium-albuterol (DUO-NEB) nebulizer solution 3 mL, 3 mL, Nebulization, 4x Daily - RT, Guy Evans MD, 3 mL at 04/05/19 1911  •  levothyroxine (SYNTHROID, LEVOTHROID) tablet 50 mcg, 50 mcg, Oral, QAM AC, Guy Evans MD, 50 mcg at 04/05/19 0837  •  lidocaine (XYLOCAINE) 2 % jelly, , , PRN, Rake, Holland HOLBROOK MD, 10 mL at 04/02/19 1901  •  lidocaine (XYLOCAINE) 2 % jelly, , Urethral, Once, Rake, Holland HOLBROOK MD  •  magic butt ointment, , Topical, BID, Guy Evans MD  •  melatonin tablet 6 mg, 6 mg, Oral, Nightly PRN, Guy Evans MD, 6 mg at 04/02/19 0024  •  sodium chloride 0.9 % flush 10 mL, 10 mL, Intravenous, PRN, Demar Finch MD, 10 mL at 04/02/19 1015  •  sodium chloride 0.9 % flush 3 mL, 3 mL, Intravenous, Q12H, Guy Evans MD, 3 mL at 04/06/19 0934  •  sodium chloride 0.9 % flush 3-10 mL, 3-10 mL, Intravenous, PRN, Guy Evans MD, 10 mL at 03/29/19 1710  •  tamsulosin (FLOMAX) 24 hr capsule 0.4 mg, 0.4 mg, Oral, Nightly, Guy Evans MD, 0.4 mg at 04/05/19 2050  •  vitamin B-12 (CYANOCOBALAMIN) tablet 1,000 mcg, 1,000 mcg, Oral, Daily, Guy Evans MD, 1,000 mcg at 04/05/19 0838    Results Review:  I have reviewed the labs, radiology results, and diagnostic studies.    Laboratory Data:   Results from last 7 days   Lab Units 04/06/19  0621 04/05/19  0810 04/04/19  0712 04/03/19  1948   SODIUM mmol/L 142 144 145 146*   POTASSIUM mmol/L 4.0 3.7 4.2 4.2   CHLORIDE mmol/L 96* 94* 97* 97*   CO2 mmol/L 32.0* 37.0* 37.0* 39.0*   BUN mg/dL 51* 51* 47* 44*   CREATININE mg/dL 2.14* 2.12* 1.80* 1.60*   GLUCOSE mg/dL 105* 76 96 88   CALCIUM mg/dL 9.5 9.4 9.5 9.2   BILIRUBIN mg/dL  --   --   --  0.4   ALK PHOS U/L  --   --   --  75   ALT (SGPT) U/L  --   --   --  10   AST (SGOT) U/L  --   --   --  17   ANION GAP mmol/L 14.0 13.0 11.0 10.0     Estimated Creatinine Clearance: 24.9 mL/min (A) (by C-G formula based on SCr of 2.14 mg/dL (H)).           Results from last 7 days   Lab Units 04/06/19  0621 04/05/19  1751 04/05/19  0847 04/04/19  0712 04/03/19  1948  04/03/19  0547   WBC 10*3/mm3 11.40*  --  10.83* 12.86* 13.04*  --  14.44*   HEMOGLOBIN g/dL 7.5* 7.3* 7.0* 7.8* 7.4*   < > 7.3*   HEMATOCRIT % 23.9* 23.3* 22.5* 25.0* 23.9*   < > 23.9*   PLATELETS 10*3/mm3 229  --  227 260 240  --  211    < > = values in this interval not displayed.     Results from last 7 days   Lab Units 04/06/19  0621 04/05/19  0810 04/04/19  0734 04/03/19  0547 04/02/19  0614   INR  2.42* 3.38* 3.05* 2.84* 2.66*       Culture Data:   Blood Culture   Date Value Ref Range Status   04/03/2019 No growth at 2 days  Preliminary   04/03/2019 No growth at 2 days  Preliminary     Urine Culture   Date Value Ref Range Status   04/03/2019 No growth  Final     No results found for: RESPCX  No results found for: WOUNDCX  No results found for: STOOLCX  No components found for: BODYFLD    Radiology Data:   Imaging Results (last 24 hours)     Procedure Component Value Units Date/Time    US Renal Bilateral [[id number]] Collected:  04/05/19 1200     Updated:  04/05/19 1438    Narrative:         [facility] [place]    Patient Name: SAIDA CUI    ATTENDING: PORTILLO LORENZANA     REFERRING: CARLITOS WOO    ORDERING: CARLITOS WOO    -----------------------    PROCEDURE: Bilateral renal ultrasound    DATE OF EXAM: 4/5/2019    HISTORY: BERLIN    Multiple longitudinal and transverse images of both kidneys were  obtained. No previous studies are available for comparison.    There is no evidence of hydronephrosis. There are several  echogenic foci with shadowing in the right kidney consistent with  intrarenal stones. Largest measures 1.2 cm in the lower pole  aspect. There is cortical irregularity and scarring of the right  kidney without evidence of solid masses. Right kidney measures  9.4 cm in length.    The left kidney shows no hydronephrosis. There is suggestions of  a hypoechoic rounded  mass involving the upper pole of the left  kidney measuring 5.3 cm in diameter. No definite left-sided  stones are seen. Cortical thickness is well preserved and the  left kidney measures 11.5 cm in length.    Arita catheter is not seen within the bladder which cannot be  evaluated.    Incidental note is made of small right pleural effusion.      Impression:       1. No hydronephrosis or acute abnormalities.  2. Right nephrolithiasis with several large, nonobstructing  stones within the right kidney.  3. Question 5.3 cm hypoechoic mass involving upper pole of left  kidney. Recommend CT evaluation.  4. Incidental note is made of small right pleural effusion.        Electronically signed by:  Sharan Meraz MD  4/5/2019 2:37 PM CDT  Workstation: [id number]          I have reviewed the patient's current medications.     Assessment/Plan     Hospital Problem List:  Principal Problem:    Pneumonia of left lower lobe due to infectious organism (CMS/HCC)  Active Problems:    Atrial fibrillation [I48.91]    Long-term (current) use of anticoagulants    COPD (chronic obstructive pulmonary disease) (CMS/HCC)    Hydronephrosis    Hypertension    Hyperlipidemia    -Left lower lobe pneumonia with Pseudomonas bacteremia: Patient was initially on meropenem and has been transitioned to cefepime.  Repeat chest x-ray on 4/5/2019 was suspicious for mild left lung infiltrate with chronic parenchymal changes.       Acute cystitis: Urine culture is positive for Pseudomonas.  Result of repeat urinalysis done 4/3/2019 and has been reviewed.  Continue cefepime.     -Acute exacerbation of COPD: Patient is not in  exacerbation but remains on his baseline supplemental oxygen of 4 L nasal prongs and maintaining saturation in the low 90s.  Continue supplemental oxygen and bronchodilators.     -Chronic atrial fibrillation: Patient is in controlled ventricular response.  Continue rate control.  Anticoagulation is on hold for now due to gross hematuria.        -History of left-sided hydronephrosis status post J stent placement: Continue Flomax.  Patient is status post removal of J stent on 4/2/2019.  He has had gross hematuria which has essentially resolved with ongoing CBI.  Findings of renal ultrasound on 4/5/2019 have been noted.  Urologist is following.       -History of severe aortic stenosis: Patient is on routine surveillance by his primary cardiologist.  Echocardiogram done on 3/27/2019 showed:  · Left atrial cavity size is moderate-to-severely dilated.  · Severe aortic valve stenosis is present.  · Mild mitral valve regurgitation is present  · Mild tricuspid valve regurgitation is present.  · Estimated EF = 65%.  · Left ventricular systolic function is normal.  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     -Hypernatremia: Resolved.          -Chronic kidney disease stage III:  Patient's baseline creatinine is between 1.4-1.65.  Creatinine has increased to 2.14 today.  Diuretics has since been discontinued.  Continue gentle hydration, continue to monitor renal function, avoid nephrotoxins and consult nephrologist.     -Anemia of chronic inflammation: Hemoglobin has improved to 7.5 following transfusion of 1 unit of packed red blood cells. Continue iron supplementation and routine monitoring of hemoglobin..     -Dependent edema both legs with chronic stasis changes: Resolved.    -Delirium: Begin supportive management.  He recently had CT scan of the head which was unremarkable.    -Nutrition: Patient to remain n.p.o. for now pending improvement in his level of consciousness.      - Deconditioning: Continue PT and OT.    -Overall prognosis is guarded.    -Update was given to patient's daughter who was at the bedside.             Discharge Planning: In progress.    Austin Chaudhry MD   04/06/19   10:45 AM

## 2019-04-06 NOTE — SIGNIFICANT NOTE
04/06/19 1536   Rehab Treatment   Discipline occupational therapy assistant   Reason Treatment Not Performed other (see comments)  (Pt with labored breathing and NSG present. NSG advised pt not appropriate for tx at this time. Lewis recheck next date.)

## 2019-04-06 NOTE — PLAN OF CARE
Problem: Patient Care Overview  Goal: Plan of Care Review  Outcome: Ongoing (interventions implemented as appropriate)   04/06/19 0129   OTHER   Outcome Summary pt vs stable, with garbled speech and decline in mental status, CBI running will continue to monitor.   Coping/Psychosocial   Plan of Care Reviewed With patient   Plan of Care Review   Progress declining     Goal: Individualization and Mutuality  Outcome: Ongoing (interventions implemented as appropriate)    Goal: Discharge Needs Assessment  Outcome: Ongoing (interventions implemented as appropriate)    Goal: Interprofessional Rounds/Family Conf  Outcome: Ongoing (interventions implemented as appropriate)      Problem: Fall Risk (Adult)  Goal: Absence of Fall  Outcome: Ongoing (interventions implemented as appropriate)      Problem: Skin Injury Risk (Adult)  Goal: Skin Health and Integrity  Outcome: Ongoing (interventions implemented as appropriate)      Problem: Wound (Includes Pressure Injury) (Adult)  Goal: Signs and Symptoms of Listed Potential Problems Will be Absent, Minimized or Managed (Wound)  Outcome: Ongoing (interventions implemented as appropriate)      Problem: Pneumonia (Adult)  Goal: Signs and Symptoms of Listed Potential Problems Will be Absent, Minimized or Managed (Pneumonia)  Outcome: Ongoing (interventions implemented as appropriate)      Problem: Chronic Obstructive Pulmonary Disease (Adult)  Goal: Signs and Symptoms of Listed Potential Problems Will be Absent, Minimized or Managed (Chronic Obstructive Pulmonary Disease)  Outcome: Ongoing (interventions implemented as appropriate)      Problem: Urinary Tract Infection (Adult)  Goal: Signs and Symptoms of Listed Potential Problems Will be Absent, Minimized or Managed (Urinary Tract Infection)  Outcome: Ongoing (interventions implemented as appropriate)

## 2019-04-06 NOTE — CONSULTS
[facility]  [address] [place] [address]. [id number]   - [phone]  F  [phone]     Consultation         PATIENT  DEMOGRAPHICS   PATIENT NAME: Seth Montiel                      PHYSICIAN: Angélica Heredia MD  : 1935  MRN: [id number]    Subjective   SUBJECTIVE   Referring Provider: Dr Chaudhry  Reason for Consultation: rachid ckd 3  History of present illness:      Mr. Montiel is a 83-year-old gentleman with a past medical history of nephrolithiasis atrial fibrillation on long-standing anticoagulant COPD who has history of chronic kidney disease stage III with baseline creatinine of 1.3.  He had a recent admission with left-sided hydronephrosis and possible stone at the UP junction.  Patient required stent placement in the last admission.  His creatinine came down to 1.3    This time he came in with productive cough with fatigue and found to have a left lower lobe pneumonia.  Has elevated INR of greater than 8.  he is currently somewhat confused but he is awake.  On this admission his stent is removed from the left ureter.  He has some catheter pull couple days ago and has gross hematuria and currently on bladder irrigation.  He also has high sodium and is getting D5W.    We have been asked to evaluate for creatinine of 2.14.  We do not have any clear urine output quantification while on CBI.      Past Medical History:   Diagnosis Date   • Aortic valve disorder    • Atrial fibrillation (CMS/HCC)    • COPD (chronic obstructive pulmonary disease) (CMS/HCC)    • Hyperlipidemia    • Hypertension    • Nonrheumatic aortic valve disorder, unspecified    • SOB (shortness of breath)      Past Surgical History:   Procedure Laterality Date   • AORTIC VALVE SURGERY     • APPENDECTOMY     • CARDIAC SURGERY     • CYSTOSCOPY, URETEROSCOPY, RETROGRADE PYELOGRAM, STENT INSERTION Left 2019    Procedure: CYSTOSCOPY, LEFT RETROGRADE, URETEROSCOPY, LASER LITHOTRIPSY, STENT PLACEMENT;   "Surgeon: Holland Johnson MD;  Location: [facility];  Service: Urology   • CYSTOSCOPY, URETEROSCOPY, RETROGRADE PYELOGRAM, STENT INSERTION Left 4/2/2019    Procedure: CYSTOSCOPY, LEFT RETROGRADE,  URETEROSCOPY,  STENT  REMOVAL;  Surgeon: Holland Johnson MD;  Location: [facility];  Service: Urology   • GALLBLADDER SURGERY     • ROTATOR CUFF REPAIR       Family History   Problem Relation Age of Onset   • No Known Problems Mother    • No Known Problems Father      Social History     Tobacco Use   • Smoking status: Former Smoker   • Smokeless tobacco: Never Used   Substance Use Topics   • Alcohol use: Yes     Comment: social   • Drug use: No     Allergies:  Patient has no known allergies.     REVIEW OF SYSTEMS    Review of Systems   Constitutional: Negative for chills and fever.   Respiratory: Positive for cough and shortness of breath. Negative for chest tightness.    Cardiovascular: Negative for chest pain and leg swelling.   Gastrointestinal: Negative for abdominal pain, diarrhea and nausea.   Genitourinary: Negative for dysuria, flank pain and hematuria.   Neurological: Negative for dizziness, syncope and weakness.       Objective   OBJECTIVE   Vital Signs  Temp:  [95.8 °F (35.4 °C)-97.9 °F (36.6 °C)] 96.6 °F (35.9 °C)  Heart Rate:  [72-95] 74  Resp:  [12-18] 18  BP: (104-132)/(44-67) 116/54    Flowsheet Rows      First Filed Value   Admission Height  175.3 cm (69\") Documented at 03/26/2019 1642   Admission Weight  71.6 kg (157 lb 12.8 oz) Documented at 03/26/2019 1642             I/O last 3 completed shifts:  In: 2964 [I.V.:2560; Blood:304; IV Piggyback:100]  Out: -4250     PHYSICAL EXAM    Physical Exam   Constitutional: He appears well-developed.   HENT:   Head: Normocephalic.   Eyes: Pupils are equal, round, and reactive to light.   Cardiovascular: Normal rate, regular rhythm and normal heart sounds.   Pulmonary/Chest: Effort normal. He has wheezes.   Abdominal: Soft. Bowel sounds are normal.   Musculoskeletal: " He exhibits no edema.   Neurological: He is alert. He exhibits normal muscle tone.       RESULTS   Results Review:    Results from last 7 days   Lab Units 04/06/19  0621 04/05/19  0810 04/04/19  0712 04/03/19 1948   SODIUM mmol/L 142 144 145 146*   POTASSIUM mmol/L 4.0 3.7 4.2 4.2   CHLORIDE mmol/L 96* 94* 97* 97*   CO2 mmol/L 32.0* 37.0* 37.0* 39.0*   BUN mg/dL 51* 51* 47* 44*   CREATININE mg/dL 2.14* 2.12* 1.80* 1.60*   CALCIUM mg/dL 9.5 9.4 9.5 9.2   BILIRUBIN mg/dL  --   --   --  0.4   ALK PHOS U/L  --   --   --  75   ALT (SGPT) U/L  --   --   --  10   AST (SGOT) U/L  --   --   --  17   GLUCOSE mg/dL 105* 76 96 88       Estimated Creatinine Clearance: 24.9 mL/min (A) (by C-G formula based on SCr of 2.14 mg/dL (H)).                Results from last 7 days   Lab Units 04/06/19  0621 04/05/19  1751 04/05/19  0847 04/04/19  0712 04/03/19 1948 04/03/19  0547   WBC 10*3/mm3 11.40*  --  10.83* 12.86* 13.04*  --  14.44*   HEMOGLOBIN g/dL 7.5* 7.3* 7.0* 7.8* 7.4*   < > 7.3*   PLATELETS 10*3/mm3 229  --  227 260 240  --  211    < > = values in this interval not displayed.       Results from last 7 days   Lab Units 04/06/19  0621 04/05/19  0810 04/04/19  0734 04/03/19  0547 04/02/19  0614   INR  2.42* 3.38* 3.05* 2.84* 2.66*        MEDICATIONS      atorvastatin 10 mg Oral Nightly   bacitracin  Topical Daily   cefepime 2 g Intravenous Q12H   diltiaZEM  mg Oral Nightly   ferrous sulfate 324 mg Oral BID With Meals   flecainide 50 mg Oral Q12H   folic acid 1 mg Oral Daily   ipratropium-albuterol 3 mL Nebulization 4x Daily - RT   levothyroxine 50 mcg Oral QAM AC   lidocaine  Urethral Once   magic butt ointment  Topical BID   sodium chloride 3 mL Intravenous Q12H   tamsulosin 0.4 mg Oral Nightly   cyanocobalamin 1,000 mcg Oral Daily       dextrose 75 mL/hr Last Rate: 75 mL/hr (04/06/19 0311)     Medications Prior to Admission   Medication Sig Dispense Refill Last Dose   • aspirin 81 MG EC tablet Take 81 mg by mouth  daily.   3/26/2019 at Unknown time   • atorvastatin (LIPITOR) 10 MG tablet Take 10 mg by mouth Every Night.   3/25/2019 at Unknown time   • Cholecalciferol (VITAMIN D3) 5000 UNITS capsule capsule Take 5,000 Units by mouth daily.   3/26/2019 at Unknown time   • diltiazem CD (CARDIZEM CD) 240 MG 24 hr capsule Take 240 mg by mouth Every Night.   3/25/2019 at Unknown time   • ferrous sulfate 325 (65 FE) MG tablet Take 325 mg by mouth 2 (two) times a day.   3/26/2019 at 0900   • flecainide (TAMBOCOR) 50 MG tablet TAKE ONE TABLET BY MOUTH EVERY 12 HOURS 60 tablet 8 3/26/2019 at 0900   • folic acid (FOLVITE) 1 MG tablet Take 1 tablet by mouth Daily. 30 tablet 1 Past Week at Unknown time   • furosemide (LASIX) 40 MG tablet Take 40 mg by mouth 3 (Three) Times a Day.   3/26/2019 at 0900   • ipratropium-albuterol (DUO-NEB) 0.5-2.5 mg/3 ml nebulizer Take 3 mL by nebulization 4 (Four) Times a Day.      • levothyroxine (SYNTHROID, LEVOTHROID) 50 MCG tablet Take 50 mcg by mouth daily.   3/26/2019 at Unknown time   • melatonin 3 MG tablet Take 2 tablets by mouth At Night As Needed for Sleep. 10 tablet 0 Past Week at Unknown time   • Specialty Vitamins Products ([facility] EYE HEALTH FORMULA) capsule Take 1 capsule by mouth Daily.   Past Week at Unknown time   • tamsulosin (FLOMAX) 0.4 MG capsule 24 hr capsule Take 1 capsule by mouth Every Night. 30 capsule 1 3/25/2019 at Unknown time   • vitamin B-12 (VITAMIN B-12) 1000 MCG tablet Take 1 tablet by mouth Daily. 30 tablet 1 Past Week at Unknown time   • warfarin (COUMADIN) 4 MG tablet Take 4 mg by mouth Daily. As directed per [facility]  Per coumadin clinic note 2/19/19 - 4 mg daily, except 2 mg on Tuesday and Friday   3/25/2019 at Unknown time     Assessment/Plan   ASSESSMENT / PLAN      Pneumonia of left lower lobe due to infectious organism (CMS/HCC)    Atrial fibrillation [I48.91]    Long-term (current) use of anticoagulants    COPD (chronic obstructive pulmonary disease)  (CMS/HCC)    Hydronephrosis    Hypertension    Hyperlipidemia    1. BERLIN on CKD3- baseline creatinine close to 1.3.  It appears to be postrenal with recent gross hematuria and may have some obstruction.  At the present moment urine is clear.  Continue bladder irrigation.  I will check urine sodium and Tomi stain.  I will switch to lactated Ringer at 75 cc/h.  His ultrasound is not showing any hydronephrosis but has multiple stone on the right side.  His left ureteral stent has been removed.     2.  Left lower lobe pneumonia with Pseudomonas bacteremia.  Patient is currently on cefepime.    3.possible cystitis positive for Pseudomonas.  Patient is currently on cefepime    4.chronic atrial fibrillation with supratherapeutic INR.  Patient's Coumadin is on hold.    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  He has prior Citrobacter and had received ceftriaxone in the last admission    6.. Anemia with fe deficiency anemia - he is currently on iron and B12.  We will give Procrit shot x1    Thank you for the referral will continue to follow the patient during his hospital stay         I discussed the patients findings and my recommendations with patient and family         This document has been electronically signed by Angélica Heredia MD on April 6, 2019 10:58 AM

## 2019-04-06 NOTE — PROGRESS NOTES
LOS: 11 days     Patient Care Team:  Saida Arce MD as PCP - General  Macey Corrales APRN as PCP - Claims Attributed      Subjective     Status post retained stent ureteral stone    Objective       Vital Signs  Temp:  [95.8 °F (35.4 °C)-97.9 °F (36.6 °C)] 96.6 °F (35.9 °C)  Heart Rate:  [72-95] 72  Resp:  [12-18] 18  BP: (104-132)/(44-67) 120/58    Physical Exam:        General Appearance:   Sleeping     Respiratory:    UNLABORED RESPIRATIONS.     Abdomen:     SOFT.       Genitourinary:  Urine clearing still blood-tinged CBI titrated     Rectal:     DEFERRED       Results Review:       Imaging Results (last 24 hours)     Procedure Component Value Units Date/Time    US Renal Bilateral [[id number]] Collected:  04/05/19 1200     Updated:  04/05/19 1438    Narrative:         [facility] [place]    Patient Name: SEE SAIDA    ATTENDING: PORTILLO LORENZANA     REFERRING: CARLITOS WOO    ORDERING: CARLITOS WOO    -----------------------    PROCEDURE: Bilateral renal ultrasound    DATE OF EXAM: 4/5/2019    HISTORY: [place]    Multiple longitudinal and transverse images of both kidneys were  obtained. No previous studies are available for comparison.    There is no evidence of hydronephrosis. There are several  echogenic foci with shadowing in the right kidney consistent with  intrarenal stones. Largest measures 1.2 cm in the lower pole  aspect. There is cortical irregularity and scarring of the right  kidney without evidence of solid masses. Right kidney measures  9.4 cm in length.    The left kidney shows no hydronephrosis. There is suggestions of  a hypoechoic rounded mass involving the upper pole of the left  kidney measuring 5.3 cm in diameter. No definite left-sided  stones are seen. Cortical thickness is well preserved and the  left kidney measures 11.5 cm in length.    Arita catheter is not seen within the bladder which cannot be  evaluated.    Incidental note is made of small right pleural  effusion.      Impression:       1. No hydronephrosis or acute abnormalities.  2. Right nephrolithiasis with several large, nonobstructing  stones within the right kidney.  3. Question 5.3 cm hypoechoic mass involving upper pole of left  kidney. Recommend CT evaluation.  4. Incidental note is made of small right pleural effusion.        Electronically signed by:  Sharan Meraz MD  4/5/2019 2:37 PM CDT  Workstation: [id number]        Lab Results (last 24 hours)     Procedure Component Value Units Date/Time    Protime-INR [[id number]]  (Abnormal) Collected:  04/06/19 0621    Specimen:  Blood Updated:  04/06/19 0748     Protime 25.3 Seconds      INR 2.42    Narrative:       Therapeutic range for most indications is 2.0-3.0 INR,  or 2.5-3.5 for mechanical heart valves.    Basic Metabolic Panel [[id number]]  (Abnormal) Collected:  04/06/19 0621    Specimen:  Blood Updated:  04/06/19 0714     Glucose 105 mg/dL      BUN 51 mg/dL      Creatinine 2.14 mg/dL      Sodium 142 mmol/L      Potassium 4.0 mmol/L      Chloride 96 mmol/L      CO2 32.0 mmol/L      Calcium 9.5 mg/dL      eGFR Non African Amer 30 mL/min/1.73      BUN/Creatinine Ratio 23.8     Anion Gap 14.0 mmol/L     Narrative:       GFR Normal >60  Chronic Kidney Disease <60  Kidney Failure <15    CBC & Differential [[id number]] Collected:  04/06/19 0621    Specimen:  Blood Updated:  04/06/19 0636    Narrative:       The following orders were created for panel order CBC & Differential.  Procedure                               Abnormality         Status                     ---------                               -----------         ------                     CBC Auto Differential[[id number]]        Abnormal            Final result                 Please view results for these tests on the individual orders.    CBC Auto Differential [[id number]]  (Abnormal) Collected:  04/06/19 0621    Specimen:  Blood Updated:  04/06/19 0636     WBC 11.40 10*3/mm3      RBC 2.70 10*6/mm3       Hemoglobin 7.5 g/dL      Hematocrit 23.9 %      MCV 88.5 fL      MCH 27.8 pg      MCHC 31.4 g/dL      RDW 16.3 %      RDW-SD 52.6 fl      MPV 10.9 fL      Platelets 229 10*3/mm3      Neutrophil % 86.4 %      Lymphocyte % 5.2 %      Monocyte % 6.8 %      Eosinophil % 1.0 %      Basophil % 0.2 %      Immature Grans % 0.4 %      Neutrophils, Absolute 9.86 10*3/mm3      Lymphocytes, Absolute 0.59 10*3/mm3      Monocytes, Absolute 0.77 10*3/mm3      Eosinophils, Absolute 0.11 10*3/mm3      Basophils, Absolute 0.02 10*3/mm3      Immature Grans, Absolute 0.05 10*3/mm3      nRBC 0.0 /100 WBC     Blood Culture - Blood, Arm, Right [[id number]] Collected:  04/03/19 1938    Specimen:  Blood from Arm, Right Updated:  04/05/19 2000     Blood Culture No growth at 2 days    Blood Culture - Blood, Arm, Left [[id number]] Collected:  04/03/19 1948    Specimen:  Blood from Arm, Left Updated:  04/05/19 2000     Blood Culture No growth at 2 days    Hemoglobin & Hematocrit, Blood [[id number]]  (Abnormal) Collected:  04/05/19 1751    Specimen:  Blood Updated:  04/05/19 1756     Hemoglobin 7.3 g/dL      Hematocrit 23.3 %             I reviewed the patient's new clinical results.  I reviewed the patient's new imaging results and agree with the interpretation.  I reviewed the patient's other test results and agree with the interpretation        Assessment/Plan       Pneumonia of left lower lobe due to infectious organism (CMS/HCC)    Atrial fibrillation [I48.91]    Long-term (current) use of anticoagulants    COPD (chronic obstructive pulmonary disease) (CMS/HCC)    Hydronephrosis    Hypertension    Hyperlipidemia      Continue CBI, IV antibiotics Case discussed with daughter      Holland Johnson MD  04/06/19  11:17 AM

## 2019-04-07 NOTE — NURSING NOTE
Around 1952 sats 73, turned pt up to 5 Liters via nasal cannula still sats only came up to 82, Charge nurse brought a nonrebreather pt came up to 98 saturation.  Respiratory came to room gave pt a breathing treatment and was able to put pt on 5L via nasal cannula sats at 96. Pt on CBI has clotted off 3x overnight, running clear but after you turn pt down to slow drip pt will begin to clot off again, adjusting rates all throught the night and monitoring closely.

## 2019-04-07 NOTE — SIGNIFICANT NOTE
04/07/19 1634   Rehab Treatment   Discipline occupational therapy assistant   Reason Treatment Not Performed other (see comments)   nursing stated pt not having good day and deferred OT at this time

## 2019-04-07 NOTE — PLAN OF CARE
Problem: Patient Care Overview  Goal: Plan of Care Review  Outcome: Ongoing (interventions implemented as appropriate)   04/07/19 1516   Coping/Psychosocial   Plan of Care Reviewed With patient   Plan of Care Review   Progress declining     Goal: Individualization and Mutuality  Outcome: Ongoing (interventions implemented as appropriate)    Goal: Discharge Needs Assessment  Outcome: Ongoing (interventions implemented as appropriate)    Goal: Interprofessional Rounds/Family Conf  Outcome: Ongoing (interventions implemented as appropriate)      Problem: Fall Risk (Adult)  Goal: Absence of Fall  Outcome: Ongoing (interventions implemented as appropriate)      Problem: Skin Injury Risk (Adult)  Goal: Skin Health and Integrity  Outcome: Ongoing (interventions implemented as appropriate)      Problem: Wound (Includes Pressure Injury) (Adult)  Goal: Signs and Symptoms of Listed Potential Problems Will be Absent, Minimized or Managed (Wound)  Outcome: Ongoing (interventions implemented as appropriate)      Problem: Pneumonia (Adult)  Goal: Signs and Symptoms of Listed Potential Problems Will be Absent, Minimized or Managed (Pneumonia)  Outcome: Ongoing (interventions implemented as appropriate)      Problem: Chronic Obstructive Pulmonary Disease (Adult)  Goal: Signs and Symptoms of Listed Potential Problems Will be Absent, Minimized or Managed (Chronic Obstructive Pulmonary Disease)  Outcome: Ongoing (interventions implemented as appropriate)      Problem: Urinary Tract Infection (Adult)  Goal: Signs and Symptoms of Listed Potential Problems Will be Absent, Minimized or Managed (Urinary Tract Infection)  Outcome: Ongoing (interventions implemented as appropriate)

## 2019-04-07 NOTE — PLAN OF CARE
Problem: Patient Care Overview  Goal: Plan of Care Review  Outcome: Ongoing (interventions implemented as appropriate)   04/07/19 0048   OTHER   Outcome Summary pt 02 had to be turned up to 5L, pt still not baseline mental status, CBI having clots still having to increase rate, will continue to monitor.   Coping/Psychosocial   Plan of Care Reviewed With patient   Plan of Care Review   Progress declining     Goal: Individualization and Mutuality  Outcome: Ongoing (interventions implemented as appropriate)    Goal: Discharge Needs Assessment  Outcome: Ongoing (interventions implemented as appropriate)    Goal: Interprofessional Rounds/Family Conf  Outcome: Ongoing (interventions implemented as appropriate)      Problem: Fall Risk (Adult)  Goal: Absence of Fall  Outcome: Ongoing (interventions implemented as appropriate)      Problem: Skin Injury Risk (Adult)  Goal: Skin Health and Integrity  Outcome: Ongoing (interventions implemented as appropriate)      Problem: Wound (Includes Pressure Injury) (Adult)  Goal: Signs and Symptoms of Listed Potential Problems Will be Absent, Minimized or Managed (Wound)  Outcome: Ongoing (interventions implemented as appropriate)      Problem: Pneumonia (Adult)  Goal: Signs and Symptoms of Listed Potential Problems Will be Absent, Minimized or Managed (Pneumonia)  Outcome: Ongoing (interventions implemented as appropriate)      Problem: Chronic Obstructive Pulmonary Disease (Adult)  Goal: Signs and Symptoms of Listed Potential Problems Will be Absent, Minimized or Managed (Chronic Obstructive Pulmonary Disease)  Outcome: Ongoing (interventions implemented as appropriate)      Problem: Urinary Tract Infection (Adult)  Goal: Signs and Symptoms of Listed Potential Problems Will be Absent, Minimized or Managed (Urinary Tract Infection)  Outcome: Ongoing (interventions implemented as appropriate)

## 2019-04-07 NOTE — SIGNIFICANT NOTE
04/07/19 1324   Rehab Treatment   Discipline physical therapy assistant   Reason Treatment Not Performed other (see comments)  (Nsg declined tx due to pt in a worse state today and is not  approperiate for PT. )

## 2019-04-07 NOTE — SIGNIFICANT NOTE
04/07/19 1634   Rehab Treatment   Discipline occupational therapy assistant   Reason Treatment Not Performed other (see comments)   nursing deferred OT this date

## 2019-04-07 NOTE — PROGRESS NOTES
[facility]  INPATIENT PROGRESS NOTE    Length of Stay: 12  Date of Admission: 3/26/2019  Primary Care Physician: Seth Arce MD    Subjective   Chief Complaint: Persistent confusion.    HPI:    Seth Montiel is a 83 y.o. male with medical history significant for COPD, atrial fibrillation, porcine valve replacement, hyperlipidemia, hypertension, and history of tobacco abuse presents with cough and weakness.      Patient was seen at his primary care provider's office for follow-up after discharge from skilled nursing facility.  Patient does report a productive cough with some fatigue.  Patient was noted to have left lower lobe pneumonia on chest x-ray and a supratherapeutic INR of greater than 8.  As such patient was sent to the emergency department for further evaluation.     Patient is a frail-appearing gentleman with barrel chest noted on exam.  Patient does have chronic hypoxic respiratory failure for which he uses 4 L/min nasal cannula and at time of exam is currently on 4 L/min nasal cannula.  Patient does endorse some fatigue, weakness, productive cough but denied any fever, chills, nausea, vomiting, chest pain, or diaphoresis     He is status post removal of J stent and currently on bladder irrigation due to gross hematuria which has much improved. He remains deconditioned, short of air on exertion and remains on his baseline supplemental oxygen of 4 L nasal prongs and maintaining saturation from 92-97%.     Patient remains n.p.o. and pleasantly confused.    Review of Systems  Unable to review due to confusion.  Objective    Temp:  [95.7 °F (35.4 °C)-97.3 °F (36.3 °C)] 97 °F (36.1 °C)  Heart Rate:  [56-93] 82  Resp:  [16-20] 20  BP: (109-149)/(56-72) 116/66    Physical Exam   Constitutional: He appears well-developed. He appears cachectic. No distress.   HENT:   Head: Normocephalic and atraumatic.   Eyes: No scleral icterus.   Neck: Neck  supple. No JVD present. No thyromegaly present.   Cardiovascular: Normal rate. An irregularly irregular rhythm present. Exam reveals no gallop and no friction rub.   Murmur heard.  Pulmonary/Chest: Effort normal and breath sounds normal. He has no wheezes. He has no rales. He exhibits no tenderness.   Abdominal: Soft. Bowel sounds are normal. He exhibits no distension and no mass. There is no tenderness. There is no rebound and no guarding.   Musculoskeletal: He exhibits no edema, tenderness or deformity.   Neurological:   Patient is pleasantly confused.   Skin: Skin is warm and dry. No rash noted. He is not diaphoretic. No erythema. No pallor.   He has chronic stasis changes on both legs.   Nursing note and vitals reviewed.        Medication Review:    Current Facility-Administered Medications:   •  albuterol (PROVENTIL) nebulizer solution 0.083% 2.5 mg/3mL, 2.5 mg, Nebulization, Q6H PRN, Idem, Zina Fulton MD, 2.5 mg at 04/07/19 0318  •  atorvastatin (LIPITOR) tablet 10 mg, 10 mg, Oral, Nightly, Guy Evans MD, 10 mg at 04/05/19 2050  •  bacitracin ointment, , Topical, Daily, Guy Evans MD  •  bisacodyl (DULCOLAX) suppository 10 mg, 10 mg, Rectal, Daily PRN, Austin Chaudhry MD, 10 mg at 04/02/19 1458  •  cefepime (MAXIPIME) 2 g/100 mL 0.9% NS (mbp), 2 g, Intravenous, Q12H, Gyu Evans MD, Last Rate: 200 mL/hr at 04/07/19 0206, 2 g at 04/07/19 0206  •  dextrose (D50W) 25 g/ 50mL Intravenous Solution 50 mL, 50 mL, Intravenous, Q1H PRN, Guy Evans MD  •  diltiaZEM CD (CARDIZEM CD) 24 hr capsule 240 mg, 240 mg, Oral, Nightly, Guy Evans MD, 240 mg at 04/05/19 2050  •  ferrous sulfate EC tablet 324 mg, 324 mg, Oral, BID With Meals, Guy Evans MD, 324 mg at 04/05/19 1838  •  flecainide (TAMBOCOR) tablet 50 mg, 50 mg, Oral, Q12H, Guy Evans MD, 50 mg at 04/05/19 2050  •  folic acid (FOLVITE) tablet 1 mg, 1 mg, Oral, Daily, Guy Evans MD, 1 mg at 04/05/19 0838  •  ipratropium-albuterol  (DUO-NEB) nebulizer solution 3 mL, 3 mL, Nebulization, 4x Daily - RT, Guy Evans MD, 3 mL at 04/07/19 1103  •  lactated ringers infusion, 75 mL/hr, Intravenous, Continuous, Angélica Heredia MD, Last Rate: 75 mL/hr at 04/06/19 1151, 75 mL/hr at 04/06/19 1151  •  levothyroxine (SYNTHROID, LEVOTHROID) tablet 50 mcg, 50 mcg, Oral, QAM AC, Guy Evans MD, 50 mcg at 04/05/19 0837  •  lidocaine (XYLOCAINE) 2 % jelly, , , PRN, Ormsby, Holland HOLBROOK MD, 10 mL at 04/02/19 1901  •  lidocaine (XYLOCAINE) 2 % jelly, , Urethral, Once, Ormsby, Holland HOLBROOK MD  •  magic butt ointment, , Topical, BID, Guy Evans MD  •  melatonin tablet 6 mg, 6 mg, Oral, Nightly PRN, Guy Evans MD, 6 mg at 04/02/19 0024  •  sodium chloride 0.9 % flush 10 mL, 10 mL, Intravenous, PRN, Demar Finch MD, 10 mL at 04/02/19 1015  •  sodium chloride 0.9 % flush 3 mL, 3 mL, Intravenous, Q12H, Guy Evans MD, 3 mL at 04/07/19 0835  •  sodium chloride 0.9 % flush 3-10 mL, 3-10 mL, Intravenous, PRN, Guy Evans MD, 10 mL at 03/29/19 1710  •  tamsulosin (FLOMAX) 24 hr capsule 0.4 mg, 0.4 mg, Oral, Nightly, Guy Evans MD, 0.4 mg at 04/05/19 2050  •  vitamin B-12 (CYANOCOBALAMIN) tablet 1,000 mcg, 1,000 mcg, Oral, Daily, Guy Evans MD, 1,000 mcg at 04/05/19 0838    Results Review:  I have reviewed the labs, radiology results, and diagnostic studies.    Laboratory Data:   Results from last 7 days   Lab Units 04/07/19  0734 04/06/19  0621 04/05/19  0810  04/03/19  1948   SODIUM mmol/L 141 142 144   < > 146*   POTASSIUM mmol/L 3.5 4.0 3.7   < > 4.2   CHLORIDE mmol/L 98 96* 94*   < > 97*   CO2 mmol/L 32.0* 32.0* 37.0*   < > 39.0*   BUN mg/dL 53* 51* 51*   < > 44*   CREATININE mg/dL 2.30* 2.14* 2.12*   < > 1.60*   GLUCOSE mg/dL 81 105* 76   < > 88   CALCIUM mg/dL 9.2 9.5 9.4   < > 9.2   BILIRUBIN mg/dL  --   --   --   --  0.4   ALK PHOS U/L  --   --   --   --  75   ALT (SGPT) U/L  --   --   --   --  10   AST (SGOT) U/L  --   --   --   --  17    ANION GAP mmol/L 11.0 14.0 13.0   < > 10.0    < > = values in this interval not displayed.     Estimated Creatinine Clearance: 23.2 mL/min (A) (by C-G formula based on SCr of 2.3 mg/dL (H)).          Results from last 7 days   Lab Units 04/07/19  0734 04/06/19  0621 04/05/19  1751 04/05/19  0847 04/04/19  0712 04/03/19  1948   WBC 10*3/mm3 9.46 11.40*  --  10.83* 12.86* 13.04*   HEMOGLOBIN g/dL 7.1* 7.5* 7.3* 7.0* 7.8* 7.4*   HEMATOCRIT % 22.4* 23.9* 23.3* 22.5* 25.0* 23.9*   PLATELETS 10*3/mm3 242 229  --  227 260 240     Results from last 7 days   Lab Units 04/07/19  0734 04/06/19  0621 04/05/19  0810 04/04/19  0734 04/03/19  0547   INR  2.17* 2.42* 3.38* 3.05* 2.84*       Culture Data:   No results found for: BLOODCX  No results found for: URINECX  No results found for: RESPCX  No results found for: WOUNDCX  No results found for: STOOLCX  No components found for: BODYFLD    Radiology Data:   Imaging Results (last 24 hours)     ** No results found for the last 24 hours. **          I have reviewed the patient's current medications.     Assessment/Plan     Hospital Problem List:  Principal Problem:    Pneumonia of left lower lobe due to infectious organism (CMS/[facility])  Active Problems:    Atrial fibrillation [I48.91]    Long-term (current) use of anticoagulants    COPD (chronic obstructive pulmonary disease) (CMS/[facility])    Hydronephrosis    Hypertension    Hyperlipidemia    -Left lower lobe pneumonia with Pseudomonas bacteremia: Patient was initially on meropenem and has been transitioned to cefepime.  Repeat chest x-ray on 4/5/2019 was suspicious for mild left lung infiltrate with chronic parenchymal changes.       Acute cystitis: Urine culture is positive for Pseudomonas.  Result of repeat urinalysis done 4/3/2019 and has been reviewed.  Continue cefepime.     -Acute exacerbation of COPD: Patient is not in  exacerbation but remains on his baseline supplemental oxygen of 4 L nasal prongs and maintaining saturation  in the low 90s.  Continue supplemental oxygen and bronchodilators.     -Chronic atrial fibrillation: Patient is in controlled ventricular response.  Continue rate control.  Anticoagulation is on hold for now due to gross hematuria.       -History of left-sided hydronephrosis status post J stent placement: Continue Flomax.  Patient is status post removal of J stent on 4/2/2019.  He has had gross hematuria which has since resolved with ongoing CBI.  Findings of renal ultrasound on 4/5/2019 have been noted.  Urologist is following.       -History of severe aortic stenosis: Patient is on routine surveillance by his primary cardiologist.  Echocardiogram done on 3/27/2019 showed:  · Left atrial cavity size is moderate-to-severely dilated.  · Severe aortic valve stenosis is present.  · Mild mitral valve regurgitation is present  · Mild tricuspid valve regurgitation is present.  · Estimated EF = 65%.  · Left ventricular systolic function is normal.  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     -Hypernatremia: Resolved.          -Chronic kidney disease stage III:  Patient's baseline creatinine is between 1.4-1.65.  Creatinine has increased to 2.30 today.  Diuretics has since been discontinued.  Continue gentle hydration, continue to monitor renal function and avoid nephrotoxins.  Input by nephrologist is appreciated.      -Anemia of chronic inflammation: Hemoglobin  is down to 7.1 today. Continue iron supplementation, routine monitoring of hemoglobin and transfuse if the need arises.     -Dependent edema both legs with chronic stasis changes: Resolved.    -Delirium: Begin supportive management.  He recently had CT scan of the head which was unremarkable.    -Nutrition: Patient to remain n.p.o. for now pending improvement in his level of consciousness.      - Deconditioning: Continue PT and  OT.    -Overall prognosis is guarded.    -Update was given to patient's granddaughter.          Discharge Planning: In progress.    Austin Chaudhry MD   04/07/19   1:51 PM

## 2019-04-07 NOTE — PROGRESS NOTES
LOS: 12 days     Patient Care Team:  Seth Arce MD as PCP - General  Macey Corrales APRN as PCP - Claims Attributed      Subjective     Hematuria kidney stones CKD 3    Objective       Vital Signs  Temp:  [95.7 °F (35.4 °C)-97.3 °F (36.3 °C)] (P) 96 °F (35.6 °C)  Heart Rate:  [56-93] 71  Resp:  [16-20] (P) 20  BP: (109-149)/(56-72) (P) 107/53    Physical Exam:        General Appearance:   Slightly more lucent     Respiratory:    UNLABORED RESPIRATIONS.     Abdomen:     SOFT.       Genitourinary:  Urine now with a yellow color with the Arita to be intact     Rectal:     DEFERRED       Results Review:       Imaging Results (last 24 hours)     ** No results found for the last 24 hours. **        Lab Results (last 24 hours)     Procedure Component Value Units Date/Time    Protime-INR [[id number]]  (Abnormal) Collected:  04/07/19 0734    Specimen:  Blood Updated:  04/07/19 0840     Protime 23.3 Seconds      INR 2.17    Narrative:       Therapeutic range for most indications is 2.0-3.0 INR,  or 2.5-3.5 for mechanical heart valves.    Basic Metabolic Panel [[id number]]  (Abnormal) Collected:  04/07/19 0734    Specimen:  Blood Updated:  04/07/19 0838     Glucose 81 mg/dL      BUN 53 mg/dL      Creatinine 2.30 mg/dL      Sodium 141 mmol/L      Potassium 3.5 mmol/L      Chloride 98 mmol/L      CO2 32.0 mmol/L      Calcium 9.2 mg/dL      eGFR Non African Amer 27 mL/min/1.73      BUN/Creatinine Ratio 23.0     Anion Gap 11.0 mmol/L     Narrative:       GFR Normal >60  Chronic Kidney Disease <60  Kidney Failure <15    CBC & Differential [[id number]] Collected:  04/07/19 0734    Specimen:  Blood Updated:  04/07/19 0819    Narrative:       The following orders were created for panel order CBC & Differential.  Procedure                               Abnormality         Status                     ---------                               -----------         ------                     CBC Auto Differential[[id number]]         Abnormal            Final result                 Please view results for these tests on the individual orders.    CBC Auto Differential [[id number]]  (Abnormal) Collected:  04/07/19 0734    Specimen:  Blood Updated:  04/07/19 0819     WBC 9.46 10*3/mm3      RBC 2.55 10*6/mm3      Hemoglobin 7.1 g/dL      Hematocrit 22.4 %      MCV 87.8 fL      MCH 27.8 pg      MCHC 31.7 g/dL      RDW 15.9 %      RDW-SD 51.0 fl      MPV 10.9 fL      Platelets 242 10*3/mm3      Neutrophil % 88.8 %      Lymphocyte % 4.3 %      Monocyte % 5.8 %      Eosinophil % 0.4 %      Basophil % 0.3 %      Immature Grans % 0.4 %      Neutrophils, Absolute 8.39 10*3/mm3      Lymphocytes, Absolute 0.41 10*3/mm3      Monocytes, Absolute 0.55 10*3/mm3      Eosinophils, Absolute 0.04 10*3/mm3      Basophils, Absolute 0.03 10*3/mm3      Immature Grans, Absolute 0.04 10*3/mm3      nRBC 0.0 /100 WBC     POC Glucose Once [[id number]]  (Abnormal) Collected:  04/07/19 0353    Specimen:  Blood Updated:  04/07/19 0437     Glucose 143 mg/dL      Comment: : [id number] [employer] ID: [id number]       POC Glucose Once [[id number]]  (Abnormal) Collected:  04/07/19 0321    Specimen:  Blood Updated:  04/07/19 0437     Glucose 66 mg/dL      Comment: RN NotifiedOperator: [id number] [employer] ID: [id number]       Blood Culture - Blood, Arm, Right [[id number]] Collected:  04/03/19 1938    Specimen:  Blood from Arm, Right Updated:  04/06/19 2000     Blood Culture No growth at 3 days    Blood Culture - Blood, Arm, Left [[id number]] Collected:  04/03/19 1948    Specimen:  Blood from Arm, Left Updated:  04/06/19 2000     Blood Culture No growth at 3 days    Sodium, Urine, Random - Urine, Clean Catch [[id number]] Collected:  04/06/19 1242    Specimen:  Urine, Clean Catch Updated:  04/06/19 1937     Sodium, Urine 35 mmol/L     Narrative:       Reference intervals for random urine have not been established.  Clinical usage is dependent upon physician's  interpretation in combination with other laboratory tests.     Urine Eosinophils, Tomi's Stain - Urine, Clean Catch [[id number]] Collected:  04/06/19 1242    Specimen:  Urine, Clean Catch Updated:  04/06/19 1301            I reviewed the patient's new clinical results.  I reviewed the patient's new imaging results and agree with the interpretation.  I reviewed the patient's other test results and agree with the interpretation        Assessment/Plan       Pneumonia of left lower lobe due to infectious organism (CMS/HCC)    Atrial fibrillation [I48.91]    Long-term (current) use of anticoagulants    COPD (chronic obstructive pulmonary disease) (CMS/HCC)    Hydronephrosis    Hypertension    Hyperlipidemia      Continue CBI as needed      Holland Johnson MD  04/07/19  10:48 AM

## 2019-04-08 NOTE — PROGRESS NOTES
[facility] Medicine Services  INPATIENT PROGRESS NOTE    Length of Stay: 13  Date of Admission: 3/26/2019  Primary Care Physician: Seth Arce MD    Subjective   Chief Complaint: Decreased level of consciousness.    HPI:    Seth Montiel is a 83 y.o. male with medical history significant for COPD, atrial fibrillation, porcine valve replacement, hyperlipidemia, hypertension, and history of tobacco abuse presents with cough and weakness.      Patient was seen at his primary care provider's office for follow-up after discharge from skilled nursing facility.  Patient does report a productive cough with some fatigue.  Patient was noted to have left lower lobe pneumonia on chest x-ray and a supratherapeutic INR of greater than 8.  As such patient was sent to the emergency department for further evaluation.     Patient is a frail-appearing gentleman with barrel chest noted on exam.  Patient does have chronic hypoxic respiratory failure for which he uses 4 L/min nasal cannula and at time of exam is currently on 4 L/min nasal cannula.  Patient does endorse some fatigue, weakness, productive cough but denied any fever, chills, nausea, vomiting, chest pain, or diaphoresis     He is status post removal of J stent and currently on bladder irrigation due to gross hematuria which has much improved. He remains deconditioned, short of air on exertion and remains on his baseline supplemental oxygen of 4 L nasal prongs and maintaining saturation from 92-97%.     Patient remains n.p.o., verbally noncommunicative with persistent decreased level of consciousness.      Review of Systems  Unable to review due to confusion.  Objective    Temp:  [96.5 °F (35.8 °C)-99.9 °F (37.7 °C)] 98.8 °F (37.1 °C)  Heart Rate:  [] 102  Resp:  [18-22] 20  BP: (114-139)/(57-72) 127/72    Physical Exam   Constitutional: He appears well-developed. He appears cachectic. No distress.   HENT:   Head:  Normocephalic and atraumatic.   Eyes: No scleral icterus.   Neck: Neck supple. No JVD present. No thyromegaly present.   Cardiovascular: Normal rate. An irregularly irregular rhythm present. Exam reveals no gallop and no friction rub.   Murmur heard.  Pulmonary/Chest: Effort normal and breath sounds normal. He has no wheezes. He has no rales. He exhibits no tenderness.   Abdominal: Soft. Bowel sounds are normal. He exhibits no distension and no mass. There is no tenderness. There is no rebound and no guarding.   Musculoskeletal: He exhibits no edema, tenderness or deformity.   Neurological:   Patient is able to respond to sternal rub and spontaneously opens his eyes.     Skin: Skin is warm and dry. No rash noted. He is not diaphoretic. No erythema. No pallor.   He has chronic stasis changes on both legs.   Nursing note and vitals reviewed.        Medication Review:    Current Facility-Administered Medications:   •  albuterol (PROVENTIL) nebulizer solution 0.083% 2.5 mg/3mL, 2.5 mg, Nebulization, Q6H PRN, Zina Francisco MD, 2.5 mg at 04/07/19 0318  •  atorvastatin (LIPITOR) tablet 10 mg, 10 mg, Oral, Nightly, Guy Evans MD, 10 mg at 04/05/19 2050  •  bacitracin ointment, , Topical, Daily, Guy Evans MD  •  bisacodyl (DULCOLAX) suppository 10 mg, 10 mg, Rectal, Daily PRN, Austin Chaudhry MD, 10 mg at 04/02/19 1458  •  cefepime (MAXIPIME) 2 g/100 mL 0.9% NS (mbp), 2 g, Intravenous, Q12H, Guy Evans MD, Last Rate: 200 mL/hr at 04/08/19 0313, 2 g at 04/08/19 0313  •  dextrose (D50W) 25 g/ 50mL Intravenous Solution 50 mL, 50 mL, Intravenous, Q1H PRN, Guy Evans MD, 50 mL at 04/08/19 0132  •  diltiaZEM CD (CARDIZEM CD) 24 hr capsule 240 mg, 240 mg, Oral, Nightly, Guy Evans MD, 240 mg at 04/05/19 2050  •  ferrous sulfate EC tablet 324 mg, 324 mg, Oral, BID With Meals, Guy Evans MD, 324 mg at 04/05/19 1838  •  flecainide (TAMBOCOR) tablet 50 mg, 50 mg, Oral, Q12H, Guy Evans MD, 50 mg at  04/05/19 2050  •  folic acid (FOLVITE) tablet 1 mg, 1 mg, Oral, Daily, Guy Evans MD, 1 mg at 04/05/19 0838  •  ipratropium-albuterol (DUO-NEB) nebulizer solution 3 mL, 3 mL, Nebulization, 4x Daily - RT, Guy Evans MD, 3 mL at 04/08/19 0758  •  lactated ringers infusion, 75 mL/hr, Intravenous, Continuous, Angélica Heredia MD, Last Rate: 75 mL/hr at 04/08/19 0909, 75 mL/hr at 04/08/19 0909  •  levothyroxine (SYNTHROID, LEVOTHROID) tablet 50 mcg, 50 mcg, Oral, QAM AC, Guy Evans MD, 50 mcg at 04/05/19 0837  •  lidocaine (XYLOCAINE) 2 % jelly, , , PRN, Holland Johnson MD, 10 mL at 04/02/19 1901  •  lidocaine (XYLOCAINE) 2 % jelly, , Urethral, Once, [place] MD  •  magic butt ointment, , Topical, BID, Guy Evans MD  •  melatonin tablet 6 mg, 6 mg, Oral, Nightly PRN, Guy Evans MD, 6 mg at 04/02/19 0024  •  sodium chloride 0.9 % flush 10 mL, 10 mL, Intravenous, PRN, Demar Finch MD, 10 mL at 04/02/19 1015  •  sodium chloride 0.9 % flush 3 mL, 3 mL, Intravenous, Q12H, Guy Evans MD, 3 mL at 04/07/19 2104  •  sodium chloride 0.9 % flush 3-10 mL, 3-10 mL, Intravenous, PRN, Guy Evans MD, 10 mL at 03/29/19 1710  •  tamsulosin (FLOMAX) 24 hr capsule 0.4 mg, 0.4 mg, Oral, Nightly, Guy Evans MD, 0.4 mg at 04/05/19 2050  •  vitamin B-12 (CYANOCOBALAMIN) tablet 1,000 mcg, 1,000 mcg, Oral, Daily, Guy Evans MD, 1,000 mcg at 04/05/19 0838    Results Review:  I have reviewed the labs, radiology results, and diagnostic studies.    Laboratory Data:   Results from last 7 days   Lab Units 04/08/19  0554 04/07/19  0734 04/06/19  0621  04/03/19  1948   SODIUM mmol/L 145 141 142   < > 146*   POTASSIUM mmol/L 3.7 3.5 4.0   < > 4.2   CHLORIDE mmol/L 97* 98 96*   < > 97*   CO2 mmol/L 32.0* 32.0* 32.0*   < > 39.0*   BUN mg/dL 52* 53* 51*   < > 44*   CREATININE mg/dL 2.48* 2.30* 2.14*   < > 1.60*   GLUCOSE mg/dL 74 81 105*   < > 88   CALCIUM mg/dL 9.1 9.2 9.5   < > 9.2   BILIRUBIN mg/dL  --    --   --   --  0.4   ALK PHOS U/L  --   --   --   --  75   ALT (SGPT) U/L  --   --   --   --  10   AST (SGOT) U/L  --   --   --   --  17   ANION GAP mmol/L 16.0 11.0 14.0   < > 10.0    < > = values in this interval not displayed.     Estimated Creatinine Clearance: 21.5 mL/min (A) (by C-G formula based on SCr of 2.48 mg/dL (H)).          Results from last 7 days   Lab Units 04/08/19  0554 04/07/19  0734 04/06/19  0621 04/05/19  1751 04/05/19  0847 04/04/19  0712   WBC 10*3/mm3 10.10 9.46 11.40*  --  10.83* 12.86*   HEMOGLOBIN g/dL 7.1* 7.1* 7.5* 7.3* 7.0* 7.8*   HEMATOCRIT % 22.8* 22.4* 23.9* 23.3* 22.5* 25.0*   PLATELETS 10*3/mm3 257 242 229  --  227 260     Results from last 7 days   Lab Units 04/08/19  0554 04/07/19  0734 04/06/19  0621 04/05/19  0810 04/04/19  0734   INR  2.29* 2.17* 2.42* 3.38* 3.05*       Culture Data:   No results found for: BLOODCX  No results found for: URINECX  No results found for: RESPCX  No results found for: WOUNDCX  No results found for: STOOLCX  No components found for: BODYFLD    Radiology Data:   Imaging Results (last 24 hours)     ** No results found for the last 24 hours. **          I have reviewed the patient's current medications.     Assessment/Plan     Hospital Problem List:  Principal Problem:    Pneumonia of left lower lobe due to infectious organism (CMS/HCC)  Active Problems:    Atrial fibrillation [I48.91]    Long-term (current) use of anticoagulants    COPD (chronic obstructive pulmonary disease) (CMS/HCC)    Hydronephrosis    Hypertension    Hyperlipidemia    -Left lower lobe pneumonia with Pseudomonas bacteremia: Patient was initially on meropenem and has been transitioned to cefepime.  Repeat chest x-ray on 4/5/2019 was suspicious for mild left lung infiltrate with chronic parenchymal changes.  Will discontinue IV antibiotic as patient has completed a course of antimicrobial therapy     Acute cystitis: Urine culture is positive for Pseudomonas.  Result of repeat  urinalysis done 4/3/2019 and has been reviewed.  Repeat urine culture was normal.  Discontinue IV antibiotics.       -Acute exacerbation of COPD: Patient is not in  exacerbation but remains on his baseline supplemental oxygen of 4 L nasal prongs and maintaining saturation in the low 90s.  Continue supplemental oxygen and bronchodilators.     -Chronic atrial fibrillation: Patient is in controlled ventricular response.  Continue rate control.  Anticoagulation is on hold for now due to gross hematuria.       -History of left-sided hydronephrosis status post J stent placement: Continue Flomax.  Patient is status post removal of J stent on 4/2/2019.  He has had gross hematuria which has since resolved with ongoing CBI.  Findings of renal ultrasound on 4/5/2019 have been noted.  Urologist is following.       -History of severe aortic stenosis: Patient is on routine surveillance by his primary cardiologist.  Echocardiogram done on 3/27/2019 showed:  · Left atrial cavity size is moderate-to-severely dilated.  · Severe aortic valve stenosis is present.  · Mild mitral valve regurgitation is present  · Mild tricuspid valve regurgitation is present.  · Estimated EF = 65%.  · Left ventricular systolic function is normal.  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     -Hypernatremia: Resolved.          -Chronic kidney disease stage III:  Patient's baseline creatinine is between 1.4-1.65.  Creatinine has increased to 2.48 today.  Diuretics has since been discontinued.  Continue gentle hydration, continue to monitor renal function and avoid nephrotoxins.  Input by nephrologist is appreciated.      -Anemia of chronic inflammation: Hemoglobin is down to 7.1 today. Continue iron supplementation, routine monitoring of hemoglobin and transfuse if the need arises.     -Dependent edema both legs with chronic  stasis changes: Resolved.    -Delirium/metabolic encephalopathy: Continue  supportive management.  He recently had CT scan of the head which was unremarkable.    -Nutrition: Patient to remain n.p.o. for now (except for some medications) pending improvement in his level of consciousness.      - Deconditioning: Continue PT and OT.    -Overall prognosis is guarded.    -Update was given to patient's granddaughter who was at the bedside.          Discharge Planning: In progress.    Austin Chaudhry MD   04/08/19   11:19 AM

## 2019-04-08 NOTE — PROGRESS NOTES
"[facility]  [address] [place] [address]. [address]   - [phone]  F - [phone]     Progress Note          PATIENT  DEMOGRAPHICS   PATIENT NAME: Seth Montiel                      PHYSICIAN: Angélica Hereida MD  : 1935  MRN: [id number]   LOS: 13 days    Patient Care Team:  Seth Arce MD as PCP - General  Macey Corrales APRN as PCP - Claims Attributed  Subjective   SUBJECTIVE   No distress resting         Objective   OBJECTIVE   Vital Signs  Temp:  [96.5 °F (35.8 °C)-99.9 °F (37.7 °C)] 98.6 °F (37 °C)  Heart Rate:  [] 96  Resp:  [18-22] 18  BP: (108-139)/(57-72) 108/59    Flowsheet Rows      First Filed Value   Admission Height  175.3 cm (69\") Documented at 2019 1642   Admission Weight  71.6 kg (157 lb 12.8 oz) Documented at 2019 1642           I/O last 3 completed shifts:  In: 2952.5 [I.V.:2952.5]  Out: -4215     PHYSICAL EXAM    Physical Exam   Constitutional: He is oriented to person, place, and time. He appears well-developed.   HENT:   Head: Normocephalic.   Eyes: Pupils are equal, round, and reactive to light.   Cardiovascular: Normal rate, regular rhythm and normal heart sounds.   Pulmonary/Chest: Effort normal and breath sounds normal.   Abdominal: Soft. Bowel sounds are normal.   Musculoskeletal: He exhibits no edema.   Neurological: He is alert and oriented to person, place, and time.       RESULTS   Results Review:    Results from last 7 days   Lab Units 19  0554 19  0734 19  0621  19  1948   SODIUM mmol/L 145 141 142   < > 146*   POTASSIUM mmol/L 3.7 3.5 4.0   < > 4.2   CHLORIDE mmol/L 97* 98 96*   < > 97*   CO2 mmol/L 32.0* 32.0* 32.0*   < > 39.0*   BUN mg/dL 52* 53* 51*   < > 44*   CREATININE mg/dL 2.48* 2.30* 2.14*   < > 1.60*   CALCIUM mg/dL 9.1 9.2 9.5   < > 9.2   BILIRUBIN mg/dL  --   --   --   --  0.4   ALK PHOS U/L  --   --   --   --  75   ALT (SGPT) U/L  --   --   --   --  10   AST (SGOT) U/L  " --   --   --   --  17   GLUCOSE mg/dL 74 81 105*   < > 88    < > = values in this interval not displayed.       Estimated Creatinine Clearance: 21.5 mL/min (A) (by C-G formula based on SCr of 2.48 mg/dL (H)).                Results from last 7 days   Lab Units 04/08/19  0554 04/07/19  0734 04/06/19  0621 04/05/19  1751 04/05/19  0847 04/04/19  0712   WBC 10*3/mm3 10.10 9.46 11.40*  --  10.83* 12.86*   HEMOGLOBIN g/dL 7.1* 7.1* 7.5* 7.3* 7.0* 7.8*   PLATELETS 10*3/mm3 257 242 229  --  227 260       Results from last 7 days   Lab Units 04/08/19  0554 04/07/19  0734 04/06/19  0621 04/05/19  0810 04/04/19  0734   INR  2.29* 2.17* 2.42* 3.38* 3.05*         Imaging Results (last 24 hours)     ** No results found for the last 24 hours. **           MEDICATIONS      atorvastatin 10 mg Oral Nightly   bacitracin  Topical Daily   diltiaZEM  mg Oral Nightly   ferrous sulfate 324 mg Oral BID With Meals   flecainide 50 mg Oral Q12H   folic acid 1 mg Oral Daily   ipratropium-albuterol 3 mL Nebulization 4x Daily - RT   levothyroxine 50 mcg Oral QAM AC   lidocaine  Urethral Once   magic butt ointment  Topical BID   sodium chloride 3 mL Intravenous Q12H   tamsulosin 0.4 mg Oral Nightly   cyanocobalamin 1,000 mcg Oral Daily       lactated ringers 75 mL/hr Last Rate: 75 mL/hr (04/08/19 0909)       Assessment/Plan   ASSESSMENT / PLAN      Pneumonia of left lower lobe due to infectious organism (CMS/HCC)    Atrial fibrillation [I48.91]    Long-term (current) use of anticoagulants    COPD (chronic obstructive pulmonary disease) (CMS/HCC)    Hydronephrosis    Hypertension    Hyperlipidemia    1. BERLIN on CKD3- baseline creatinine close to 1.3.  It appears to be postrenal with recent gross hematuria and may have some obstruction.  At the present moment urine is clear.  Continue bladder irrigation. Also cant rule berlin due to antibiotics. Tomi is + and I will add prednisone. Keep LR lactated Ringer at 75 cc/h.  His ultrasound is not  showing any hydronephrosis but has multiple stone on the right side.  His left ureteral stent has been removed. We will discuss with his daughter      2.  Left lower lobe pneumonia with Pseudomonas bacteremia.  Patient received cefepime and now stopped     3.possible cystitis positive for Pseudomonas.  finished cefepime     4.chronic atrial fibrillation with supratherapeutic INR.  Patient's Coumadin is on hold.     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  He has prior Citrobacter and had received ceftriaxone in the last admission     6.. Anemia with fe deficiency anemia - he is currently on iron and B12.  s/p Procrit shot x1                   This document has been electronically signed by Angélica Heredia MD on April 8, 2019 11:29 AM

## 2019-04-08 NOTE — PLAN OF CARE
Problem: Patient Care Overview  Goal: Plan of Care Review  Outcome: Ongoing (interventions implemented as appropriate)   04/08/19 0104   OTHER   Outcome Summary pt mental status not baseline, CBI running clear, vs stable will contine to monitor.   Coping/Psychosocial   Plan of Care Reviewed With patient   Plan of Care Review   Progress declining     Goal: Individualization and Mutuality  Outcome: Ongoing (interventions implemented as appropriate)    Goal: Discharge Needs Assessment  Outcome: Ongoing (interventions implemented as appropriate)    Goal: Interprofessional Rounds/Family Conf  Outcome: Ongoing (interventions implemented as appropriate)      Problem: Fall Risk (Adult)  Goal: Absence of Fall  Outcome: Ongoing (interventions implemented as appropriate)      Problem: Skin Injury Risk (Adult)  Goal: Skin Health and Integrity  Outcome: Ongoing (interventions implemented as appropriate)      Problem: Wound (Includes Pressure Injury) (Adult)  Goal: Signs and Symptoms of Listed Potential Problems Will be Absent, Minimized or Managed (Wound)  Outcome: Ongoing (interventions implemented as appropriate)      Problem: Pneumonia (Adult)  Goal: Signs and Symptoms of Listed Potential Problems Will be Absent, Minimized or Managed (Pneumonia)  Outcome: Ongoing (interventions implemented as appropriate)      Problem: Chronic Obstructive Pulmonary Disease (Adult)  Goal: Signs and Symptoms of Listed Potential Problems Will be Absent, Minimized or Managed (Chronic Obstructive Pulmonary Disease)  Outcome: Ongoing (interventions implemented as appropriate)      Problem: Urinary Tract Infection (Adult)  Goal: Signs and Symptoms of Listed Potential Problems Will be Absent, Minimized or Managed (Urinary Tract Infection)  Outcome: Ongoing (interventions implemented as appropriate)

## 2019-04-08 NOTE — SIGNIFICANT NOTE
04/08/19 0942   Rehab Treatment   Discipline physical therapy assistant   Reason Treatment Not Performed unable to treat, medical status change  (nsg. stated pt. not appropriate for therapy today)

## 2019-04-08 NOTE — NURSING NOTE
Came in to room pt looked hot and clammy check fsbs 51 pushed amp of D50 will reacheck in 20 mins.

## 2019-04-08 NOTE — PROGRESS NOTES
"   LOS: 13 days   Patient Care Team:  Seth Arce MD as PCP - General  [place], LIZET Douglas as PCP - Claims Attributed    Subjective     Subjective:  Symptoms:  He reports malaise, cough and weakness.  No shortness of breath.  (Urine pale pink with CBI slowly running. He is alert to person, place but confused to time and situation. His granddaughter is present and he recognizes her easily. He states he feels confused. Some resistance to my exam but reorients to who I am as well. He denies pain. ).    Diet:  No nausea or vomiting.    Activity level: Impaired due to weakness.    Pain:  He reports no pain.        History taken from: patient chart RN    Objective     Vital Signs  Temp:  [96.5 °F (35.8 °C)-99.9 °F (37.7 °C)] 98.6 °F (37 °C)  Heart Rate:  [] 96  Resp:  [18-22] 18  BP: (108-139)/(57-72) 108/59    Objective:  General Appearance:  In no acute distress.    Vital signs: (most recent): Blood pressure 108/59, pulse 96, temperature 98.6 °F (37 °C), temperature source Axillary, resp. rate 18, height 175.3 cm (69\"), weight 66.2 kg (145 lb 14.4 oz), SpO2 90 %.  Vital signs are normal.  No fever.    Output: Producing urine (Arita pale pink urine no clots).    HEENT: Normal HEENT exam.    Lungs:  Normal effort and normal respiratory rate.  There are decreased breath sounds.    Heart: Normal rate.  Regular rhythm.  S1 normal and S2 normal.  Positive for murmur.    Chest: Symmetric chest wall expansion.   Abdomen: Abdomen is soft and non-distended.  Bowel sounds are normal.   There is no abdominal tenderness.     Extremities: Normal range of motion.    Pulses: Distal pulses are intact.    Neurological: Patient is alert and oriented to person, place and time.  GCS score is 15.    Pupils:  Pupils are equal, round, and reactive to light.    Skin:  Warm, dry and pale.  No rash, ecchymosis or cyanosis.             Results Review:    Lab Results (last 24 hours)     Procedure Component Value Units Date/Time    " POC Glucose Once [[id number]]  (Abnormal) Collected:  04/08/19 1129    Specimen:  Blood Updated:  04/08/19 1141     Glucose 65 mg/dL      Comment: RN NotifiedOperator: [id number] NATALI WESTONAZARMeter ID: [id number]       POC Glucose Once [[id number]]  (Normal) Collected:  04/08/19 0855    Specimen:  Blood Updated:  04/08/19 0938     Glucose 72 mg/dL      Comment: RN NotifiedOperator: [id number] NATALI WESTONHAROONFERMeter ID: [id number]       Urine Eosinophils, Tomi's Stain - Urine, Clean Catch [[id number]]  (Abnormal) Collected:  04/06/19 1242    Specimen:  Urine, Clean Catch Updated:  04/08/19 0911     Tomi Stain Positive     % EOS Tomi Stain 1 %     Basic Metabolic Panel [[id number]]  (Abnormal) Collected:  04/08/19 0554    Specimen:  Blood Updated:  04/08/19 0704     Glucose 74 mg/dL      BUN 52 mg/dL      Creatinine 2.48 mg/dL      Sodium 145 mmol/L      Potassium 3.7 mmol/L      Chloride 97 mmol/L      CO2 32.0 mmol/L      Calcium 9.1 mg/dL      eGFR Non African Amer 25 mL/min/1.73      BUN/Creatinine Ratio 21.0     Anion Gap 16.0 mmol/L      Comment: Unable to calculate Anion Gap.       Narrative:       GFR Normal >60  Chronic Kidney Disease <60  Kidney Failure <15    Protime-INR [[id number]]  (Abnormal) Collected:  04/08/19 0554    Specimen:  Blood Updated:  04/08/19 0650     Protime 24.2 Seconds      INR 2.29    Narrative:       Therapeutic range for most indications is 2.0-3.0 INR,  or 2.5-3.5 for mechanical heart valves.    CBC & Differential [[id number]] Collected:  04/08/19 0554    Specimen:  Blood Updated:  04/08/19 0650    Narrative:       The following orders were created for panel order CBC & Differential.  Procedure                               Abnormality         Status                     ---------                               -----------         ------                     CBC Auto Differential[[id number]]        Abnormal            Final result                 Please view results for these tests  on the individual orders.    CBC Auto Differential [[id number]]  (Abnormal) Collected:  04/08/19 0554    Specimen:  Blood Updated:  04/08/19 0650     WBC 10.10 10*3/mm3      RBC 2.55 10*6/mm3      Hemoglobin 7.1 g/dL      Hematocrit 22.8 %      MCV 89.4 fL      MCH 27.8 pg      MCHC 31.1 g/dL      RDW 15.9 %      RDW-SD 51.7 fl      MPV 10.7 fL      Platelets 257 10*3/mm3      Neutrophil % 90.2 %      Lymphocyte % 3.9 %      Monocyte % 5.0 %      Eosinophil % 0.2 %      Basophil % 0.3 %      Immature Grans % 0.4 %      Neutrophils, Absolute 9.11 10*3/mm3      Lymphocytes, Absolute 0.39 10*3/mm3      Monocytes, Absolute 0.51 10*3/mm3      Eosinophils, Absolute 0.02 10*3/mm3      Basophils, Absolute 0.03 10*3/mm3      Immature Grans, Absolute 0.04 10*3/mm3      nRBC 0.0 /100 WBC     POC Glucose Once [[id number]]  (Normal) Collected:  04/08/19 0207    Specimen:  Blood Updated:  04/08/19 0221     Glucose 118 mg/dL      Comment: RN NotifiedOperator: [id number] [employer] DESTINYMeter ID: [id number]       POC Glucose Once [[id number]]  (Abnormal) Collected:  04/08/19 0124    Specimen:  Blood Updated:  04/08/19 0135     Glucose 51 mg/dL      Comment: RN NotifiedOperator: [id number] [employer] DESTINYMeter ID: [id number]       Blood Culture - Blood, Arm, Right [[id number]] Collected:  04/03/19 1938    Specimen:  Blood from Arm, Right Updated:  04/07/19 2000     Blood Culture No growth at 4 days    Blood Culture - Blood, Arm, Left [[id number]] Collected:  04/03/19 1948    Specimen:  Blood from Arm, Left Updated:  04/07/19 2000     Blood Culture No growth at 4 days         Imaging Results (last 24 hours)     ** No results found for the last 24 hours. **           I reviewed the patient's new clinical results.  I reviewed the patient's new imaging results and agree with the interpretation.  I reviewed the patient's other test results and agree with the interpretation      Assessment/Plan       Pneumonia of left lower lobe due to  infectious organism (CMS/HCC)    Atrial fibrillation [I48.91]    Long-term (current) use of anticoagulants    COPD (chronic obstructive pulmonary disease) (CMS/HCC)    Hydronephrosis    Hypertension    Hyperlipidemia      Assessment & Plan    1. Bacteremia  2. UTI cystitis   3. History of left hydronephrosis with J-stent in place  4. Gross hematuria no clots post op/post Arita placement-->nearly resolved with CBI minimally running  5. Retention of urine, Arita anchored 4/3/19  -2/28/19 cystoscopy left ureteroscopy (findings: ), J-stent placed--pathology is negative for neoplasm  -4/2/19 cystoscopy and left J-stent removal    -Estimated Creatinine Clearance: 21.5 mL/min (A) (by C-G formula based on SCr of 2.48 mg/dL (H)).   -Cr 1.46-->1.51-->1.33-->1.30-->1.32-->1.29-->1.43-->1.8-->2.12-->2.3-->2.48  -INR 2.29 (Warfarin on hold)  -Hgb/Hct 7.1/22.8  -3/26/19 urine and blood cultures positive for Pseudomonas aeruginosa  -Repeat urine culture 4/3/19 negative and blood cultures in progress   -Flomax   -cultures negative 4/3/19     Plan:   Continue Flomax   Continue Arita and titrate CBI until discontinued    LIZET Walters  04/08/19  1:27 PM

## 2019-04-08 NOTE — SIGNIFICANT NOTE
Per nsg, pt not appropriate for therapy today.     04/08/19 1142   Rehab Treatment   Discipline occupational therapy assistant   Reason Treatment Not Performed other (see comments)

## 2019-04-09 PROBLEM — N20.0 RIGHT NEPHROLITHIASIS: Status: ACTIVE | Noted: 2019-01-01

## 2019-04-09 PROBLEM — N13.30 HYDRONEPHROSIS: Chronic | Status: ACTIVE | Noted: 2019-01-01

## 2019-04-09 PROBLEM — N28.89 LEFT KIDNEY MASS: Chronic | Status: ACTIVE | Noted: 2019-01-01

## 2019-04-09 PROBLEM — T45.511A COUMADIN TOXICITY: Status: ACTIVE | Noted: 2019-01-01

## 2019-04-09 PROBLEM — J96.21 ACUTE ON CHRONIC RESPIRATORY FAILURE WITH HYPOXIA AND HYPERCAPNIA (HCC): Chronic | Status: ACTIVE | Noted: 2019-01-01

## 2019-04-09 PROBLEM — A41.52 PSEUDOMONAS SEPSIS (HCC): Status: ACTIVE | Noted: 2019-01-01

## 2019-04-09 PROBLEM — N18.4 CKD (CHRONIC KIDNEY DISEASE) STAGE 4, GFR 15-29 ML/MIN (HCC): Chronic | Status: ACTIVE | Noted: 2019-01-01

## 2019-04-09 PROBLEM — D63.8 ANEMIA, CHRONIC DISEASE: Chronic | Status: ACTIVE | Noted: 2019-01-01

## 2019-04-09 PROBLEM — I35.0 SEVERE AORTIC STENOSIS: Chronic | Status: ACTIVE | Noted: 2019-01-01

## 2019-04-09 PROBLEM — T82.857A PROSTHETIC AORTIC VALVE STENOSIS: Chronic | Status: ACTIVE | Noted: 2019-01-01

## 2019-04-09 PROBLEM — J96.22 ACUTE ON CHRONIC RESPIRATORY FAILURE WITH HYPOXIA AND HYPERCAPNIA (HCC): Chronic | Status: ACTIVE | Noted: 2019-01-01

## 2019-04-09 PROBLEM — N20.0 RIGHT NEPHROLITHIASIS: Chronic | Status: ACTIVE | Noted: 2019-01-01

## 2019-04-09 PROBLEM — J20.9 CHRONIC BRONCHITIS WITH ACUTE EXACERBATION (HCC): Chronic | Status: ACTIVE | Noted: 2019-01-01

## 2019-04-09 PROBLEM — J42 CHRONIC BRONCHITIS WITH ACUTE EXACERBATION (HCC): Chronic | Status: ACTIVE | Noted: 2019-01-01

## 2019-04-09 NOTE — THERAPY DISCHARGE NOTE
Acute Care - Occupational Therapy Discharge Summary  [facility]     Patient Name: Seth Montiel  : 1935  MRN: [id number]    Today's Date: 2019  Onset of Illness/Injury or Date of Surgery: 19    Date of Referral to OT: 19  Referring Physician: ERNA Curran MD      Admit Date: 3/26/2019        OT Recommendation and Plan    Visit Dx:    ICD-10-CM ICD-9-CM   1. Pneumonia of left lower lobe due to infectious organism (CMS/HCC) J18.1 486   2. Poisoning by warfarin sodium, accidental or unintentional, initial encounter T45.511A 964.2     E858.2   3. Impaired functional mobility, balance, gait, and endurance Z74.09 V49.89   4. Impaired mobility and activities of daily living Z74.09 799.89   5. Dysphagia, unspecified type R13.10 787.20   6. Hydronephrosis N13.30 591               Rehab Goal Summary     Row Name 19 1244             Occupational Therapy Goals    Bed Mobility Goal Selection (OT)  bed mobility, OT goal 1  -AS      Transfer Goal Selection (OT)  transfer, OT goal 1  -AS      Bathing Goal Selection (OT)  bathing, OT goal 1  -AS      Dressing Goal Selection (OT)  dressing, OT goal 1  -AS         Bed Mobility Goal 1 (OT)    Activity/Assistive Device (Bed Mobility Goal 1, OT)  bed mobility activities, all  -AS      [place] Level/Cues Needed (Bed Mobility Goal 1, OT)  contact guard assist  -AS      Time Frame (Bed Mobility Goal 1, OT)  long term goal (LTG)  -AS      Progress/Outcomes (Bed Mobility Goal 1, OT)  goal not met  -AS         Transfer Goal 1 (OT)    Activity/Assistive Device (Transfer Goal 1, OT)  transfers, all  -AS      [place] Level/Cues Needed (Transfer Goal 1, OT)  supervision required  -AS      Time Frame (Transfer Goal 1, OT)  long term goal (LTG)  -AS      Progress/Outcome (Transfer Goal 1, OT)  goal not met  -AS         Bathing Goal 1 (OT)    Activity/Assistive Device (Bathing Goal 1, OT)  bathing skills, all  -AS      [place] Level/Cues  Needed (Bathing Goal 1, OT)  minimum assist (75% or more patient effort)  -AS      Time Frame (Bathing Goal 1, OT)  long term goal (LTG)  -AS      Progress/Outcomes (Bathing Goal 1, OT)  goal not met  -AS         Dressing Goal 1 (OT)    Activity/Assistive Device (Dressing Goal 1, OT)  dressing skills, all  -AS      [place]/Cues Needed (Dressing Goal 1, OT)  minimum assist (75% or more patient effort)  -AS      Time Frame (Dressing Goal 1, OT)  long term goal (LTG)  -AS      Progress/Outcome (Dressing Goal 1, OT)  goal not met  -AS         Patient Education Goal (OT)    Activity (Patient Education Goal, OT)  B UE HEP for increased independence with functional mobility and ADLs, EC/WS and home safety/fall prev.  -AS      [place]/Cues/Accuracy (Memory Goal 2, OT)  demonstrates adequately;verbalizes understanding  -AS      Time Frame (Patient Education Goal, OT)  long term goal (LTG)  -AS      Progress/Outcome (Patient Education Goal, OT)  goal not met  -AS        User Key  (r) = Recorded By, (t) = Taken By, (c) = Cosigned By    Initials Name Provider Type Discipline    AS Yisel Sanders, OT Occupational Therapist OT              Therapy Suggested Charges     Code   Minutes Charges    None                 OT Discharge Summary  Anticipated Discharge Disposition (OT): skilled nursing facility  Reason for Discharge: Unable to participate  Outcomes Achieved: Refer to plan of care for updates on goals achieved  Discharge Destination: other (comment)(still currently a patient at facility)      Yisel Sanders OT  4/9/2019

## 2019-04-09 NOTE — PROGRESS NOTES
"   LOS: 14 days   Patient Care Team:  Seth Arce MD as PCP - General  [place], LIZET Douglas as PCP - Claims Attributed    Subjective     Subjective:  Symptoms:  No shortness of breath.  (No gross hematuria, Arita in place. Minimal response with stimulus. ).    Diet:  No nausea or vomiting.    Activity level: Impaired due to weakness.    Pain:  He reports no pain.        History taken from: patient chart RN    Objective     Vital Signs  Temp:  [96.3 °F (35.7 °C)-99.5 °F (37.5 °C)] 97.2 °F (36.2 °C)  Heart Rate:  [] 111  Resp:  [18-28] 24  BP: (118-133)/(64-81) 130/81    Objective:  General Appearance:  In no acute distress.    Vital signs: (most recent): Blood pressure 130/81, pulse 111, temperature 97.2 °F (36.2 °C), temperature source Axillary, resp. rate 24, height 175.3 cm (69\"), weight 66.2 kg (145 lb 14.4 oz), SpO2 96 %.  Vital signs are normal.  No fever.    Output: Producing urine (Arita no clots).    HEENT: Normal HEENT exam.    Lungs:  Normal effort and normal respiratory rate.  There are decreased breath sounds.    Heart: Normal rate.  Regular rhythm.  S1 normal and S2 normal.  Positive for murmur.    Chest: Symmetric chest wall expansion.   Abdomen: Abdomen is soft and non-distended.  Bowel sounds are normal.   There is no abdominal tenderness.     Extremities: Normal range of motion.    Pulses: Distal pulses are intact.    Neurological: Patient is alert and oriented to person, place and time.  GCS score is 15.    Pupils:  Pupils are equal, round, and reactive to light.    Skin:  Warm, dry and pale.  No rash, ecchymosis or cyanosis.             Results Review:    Lab Results (last 24 hours)     Procedure Component Value Units Date/Time    POC Glucose Once [[id number]]  (Normal) Collected:  04/09/19 1023    Specimen:  Blood Updated:  04/09/19 1039     Glucose 120 mg/dL      Comment: RN NotifiedOperator: [id number] GRACIELAALEX DICKSONTREVeterans Affairs [facility] [place] – [place] ID: [id number]       Basic Metabolic Panel " [[id number]]  (Abnormal) Collected:  04/09/19 0639    Specimen:  Blood Updated:  04/09/19 0923     Glucose 107 mg/dL      BUN 59 mg/dL      Creatinine 2.52 mg/dL      Sodium 146 mmol/L      Potassium 3.8 mmol/L      Chloride 103 mmol/L      CO2 31.0 mmol/L      Calcium 9.1 mg/dL      eGFR Non African Amer 25 mL/min/1.73      BUN/Creatinine Ratio 23.4     Anion Gap 12.0 mmol/L     Narrative:       GFR Normal >60  Chronic Kidney Disease <60  Kidney Failure <15    POC Glucose Once [[id number]]  (Normal) Collected:  04/09/19 0726    Specimen:  Blood Updated:  04/09/19 0751     Glucose 103 mg/dL      Comment: : [id number] ELAINE Negronjazmine ID: [id number]       Protime-INR [[id number]]  (Abnormal) Collected:  04/09/19 0639    Specimen:  Blood Updated:  04/09/19 0725     Protime 23.7 Seconds      INR 2.22    Narrative:       Therapeutic range for most indications is 2.0-3.0 INR,  or 2.5-3.5 for mechanical heart valves.    CBC & Differential [[id number]] Collected:  04/09/19 0639    Specimen:  Blood Updated:  04/09/19 0704    Narrative:       The following orders were created for panel order CBC & Differential.  Procedure                               Abnormality         Status                     ---------                               -----------         ------                     CBC Auto Differential[[id number]]        Abnormal            Final result                 Please view results for these tests on the individual orders.    CBC Auto Differential [[id number]]  (Abnormal) Collected:  04/09/19 0639    Specimen:  Blood Updated:  04/09/19 0704     WBC 7.39 10*3/mm3      RBC 2.74 10*6/mm3      Hemoglobin 7.6 g/dL      Hematocrit 24.6 %      MCV 89.8 fL      MCH 27.7 pg      MCHC 30.9 g/dL      RDW 16.0 %      RDW-SD 52.1 fl      MPV 10.5 fL      Platelets 283 10*3/mm3      Neutrophil % 96.7 %      Lymphocyte % 1.9 %      Monocyte % 0.9 %      Eosinophil % 0.0 %      Basophil % 0.0 %      Immature Grans % 0.5 %       Neutrophils, Absolute 7.14 10*3/mm3      Lymphocytes, Absolute 0.14 10*3/mm3      Monocytes, Absolute 0.07 10*3/mm3      Eosinophils, Absolute 0.00 10*3/mm3      Basophils, Absolute 0.00 10*3/mm3      Immature Grans, Absolute 0.04 10*3/mm3      nRBC 0.0 /100 WBC     POC Glucose Once [[id number]]  (Normal) Collected:  04/09/19 0411    Specimen:  Blood Updated:  04/09/19 0554     Glucose 105 mg/dL      Comment: : [id number] BRANDON TORIMeter ID: [id number]       POC Glucose Once [[id number]]  (Normal) Collected:  04/08/19 2343    Specimen:  Blood Updated:  04/09/19 0554     Glucose 125 mg/dL      Comment: RN NotifiedOperator: [id number] GAGANDEEP KOCHAHMeter ID: [id number]       POC Glucose Once [[id number]]  (Abnormal) Collected:  04/08/19 2039    Specimen:  Blood Updated:  04/09/19 0554     Glucose 62 mg/dL      Comment: RN NotifiedOperator: [id number] GAGANDEEP KOCHAHMeter ID: [id number]       Blood Culture - Blood, Arm, Right [[id number]] Collected:  04/03/19 1938    Specimen:  Blood from Arm, Right Updated:  04/08/19 2000     Blood Culture No growth at 5 days    Blood Culture - Blood, Arm, Left [[id number]] Collected:  04/03/19 1948    Specimen:  Blood from Arm, Left Updated:  04/08/19 2000     Blood Culture No growth at 5 days         Imaging Results (last 24 hours)     ** No results found for the last 24 hours. **           I reviewed the patient's new clinical results.  I reviewed the patient's new imaging results and agree with the interpretation.  I reviewed the patient's other test results and agree with the interpretation      Assessment/Plan       Acute on chronic respiratory failure with hypoxia and hypercapnia (CMS/HCC)    Atrial fibrillation [I48.91]    Long-term (current) use of anticoagulants    Hypertension    Right nephrolithiasis    Left kidney mass    Severe aortic stenosis    Prosthetic aortic valve stenosis    Pseudomonas sepsis (CMS/HCC)    Chronic bronchitis with acute exacerbation  (CMS/HCC)    Coumadin toxicity      Assessment & Plan    1. Bacteremia  2. UTI cystitis   3. History of left hydronephrosis with J-stent in place  4. Gross hematuria no clots post op/post Arita placement-->nearly resolved with CBI minimally running  5. Retention of urine, Arita anchored 4/3/19  -2/28/19 cystoscopy left ureteroscopy (findings: ), J-stent placed--pathology is negative for neoplasm  -4/2/19 cystoscopy and left J-stent removal    -Estimated Creatinine Clearance: 21.2 mL/min (A) (by C-G formula based on SCr of 2.52 mg/dL (H)).   -Cr 2.52  -INR 2.22 (Warfarin on hold)  -Hgb/Hct 7.1/22.8-->7.6/24.6  -3/26/19 urine and blood cultures positive for Pseudomonas aeruginosa  -Repeat urine culture 4/3/19 negative and blood cultures in progress   -Flomax   -cultures negative 4/3/19     Plan:   Continue Flomax   Continue Arita and titrate CBI until discontinued    LIZET Walters  04/09/19  2:41 PM

## 2019-04-09 NOTE — CONSULTS
"Adult Nutrition  Assessment    Patient Name:  Seth Montiel  YOB: 1935  MRN: [id number]  Admit Date:  3/26/2019    Assessment Date:  4/9/2019    Comments:  Pt is NPO, spoke to nurse. Health status declining. Will monitor per protocol.    Reason for Assessment     Row Name 04/09/19 1624          Reason for Assessment    Reason For Assessment  follow-up protocol  (Pended)      Diagnosis  pulmonary disease  (Pended)      Identified At Risk by Screening Criteria  other (see comments)  (Pended)  NPO for past 3 days         Nutrition/Diet History     Row Name 04/09/19 1626          Nutrition/Diet History    Typical Food/Fluid Intake  pt is NPO, spoke to nurse, pt health status is declining  (Pended)          Anthropometrics     Row Name 04/09/19 1628          Anthropometrics    Height  175.3 cm (69.02\")  (Pended)      Current Weight Method  estimated  (Pended)      Weight  66.2 kg (145 lb 15.1 oz)  (Pended)         Ideal Body Weight (IBW)    Ideal Body Weight (IBW) (kg)  73.73  (Pended)      % Ideal Body Weight  89.79  (Pended)      % of Ideal Body Weight Assessment  80-90%: mild deficit  (Pended)         Body Mass Index (BMI)    BMI (kg/m2)  21.59  (Pended)      BMI Assessment  BMI 18.5-24.9: normal  (Pended)         IBW Adjustment, Para/Tetraplegia    5% Adjustment, Para (IBW)  70.04  (Pended)      10% Adjustment, Para (IBW)  66.36  (Pended)      10% Adjustment, Tetra (IBW)  66.36  (Pended)      15% Adjustment, Tetra (IBW)  62.67  (Pended)          Labs/Tests/Procedures/Meds     Row Name 04/09/19 1630          Labs/Procedures/Meds    Lab Results Reviewed  reviewed, pertinent  (Pended)      Lab Results Comments  Na 146, Glu 107, BUN 59  (Pended)         Medications    Pertinent Medications Reviewed  reviewed  (Pended)            Estimated/Assessed Needs     Row Name 04/09/19 1632 04/09/19 1628       Calculation Measurements    Weight Used For Calculations  66.2 kg (145 lb 15.1 oz)  (Pended)   -- " "   Height  --  175.3 cm (69.02\")  (Pended)        Estimated/Assessed Needs    Additional Documentation  Fluid Requirements (Group);KCAL/KG (Group)  (Pended)   --       KCAL/KG    14 Kcal/Kg (kcal)  926.8  (Pended)   --    15 Kcal/Kg (kcal)  993  (Pended)   --    18 Kcal/Kg (kcal)  1191.6  (Pended)   --    20 Kcal/Kg (kcal)  1324  (Pended)   --    25 Kcal/Kg (kcal)  1655  (Pended)   --    30 Kcal/Kg (kcal)  1986  (Pended)   --    35 Kcal/Kg (kcal)  2317  (Pended)   --    40 Kcal/Kg (kcal)  2648  (Pended)   --    45 Kcal/Kg (kcal)  2979  (Pended)   --    50 Kcal/Kg (kcal)  3310  (Pended)   --    kcal/kg (Specify)  1986  (Pended)   --       [place]-St. Jeor Equation    RMR ([place]-St. Jeor Equation)  1352.63  (Pended)   --       Protein Requirements    Weight Used For Protein Calculations  66.2 kg (145 lb 15.1 oz)  (Pended)   --    Est Protein Requirement Amount (gms/kg)  0.8 gm protein  (Pended)   --    Estimated Protein Requirements (gms/day)  52.96  (Pended)   --       Fluid Requirements    Estimated Fluid Requirement Method  RDA Method  (Pended)   --    [place] Method (over 20 kg)  2824  (Pended)   --        Nutrition Prescription Ordered     Row Name 04/09/19 1633          Nutrition Prescription PO    Current PO Diet  NPO  (Pended)      Fluid Restriction mL per Day  --  (Pended)  2000         Evaluation of Received Nutrient/Fluid Intake     Row Name 04/09/19 1632 04/09/19 1628       Calculation Measurements    Weight Used For Calculations  66.2 kg (145 lb 15.1 oz)  (Pended)   --    Height  --  175.3 cm (69.02\")  (Pended)         Evaluation of Prescribed Nutrient/Fluid Intake     Row Name 04/09/19 1632 04/09/19 1628       Calculation Measurements    Weight Used For Calculations  66.2 kg (145 lb 15.1 oz)  (Pended)   --    Height  --  175.3 cm (69.02\")  (Pended)             Electronically signed by:  Marta Carvalho  04/09/19 4:42 PM  "

## 2019-04-09 NOTE — PROGRESS NOTES
"[facility]  [address] [place] [address]. [address]  T - [phone]  F - [phone]     Progress Note          PATIENT  DEMOGRAPHICS   PATIENT NAME: Seth Montiel                      PHYSICIAN: Angélica Heredia MD  : 1935  MRN: [id number]   LOS: 14 days    Patient Care Team:  Seth Arce MD as PCP - General  Macey Corrales APRN as PCP - Claims Attributed  Subjective   SUBJECTIVE   Pt is resting some resp distress no responding to questions         Objective   OBJECTIVE   Vital Signs  Temp:  [96.3 °F (35.7 °C)-99.5 °F (37.5 °C)] 97.2 °F (36.2 °C)  Heart Rate:  [] 111  Resp:  [18-28] 24  BP: (118-133)/(64-81) 130/81    Flowsheet Rows      First Filed Value   Admission Height  175.3 cm (69\") Documented at 2019 1642   Admission Weight  71.6 kg (157 lb 12.8 oz) Documented at 2019 1642           I/O last 3 completed shifts:  In: 4902.5 [I.V.:4852.5; IV Piggyback:50]  Out: -     PHYSICAL EXAM    Physical Exam   Constitutional: He is oriented to person, place, and time. He appears well-developed.   HENT:   Head: Normocephalic.   Eyes: Pupils are equal, round, and reactive to light.   Cardiovascular: Normal rate, regular rhythm and normal heart sounds.   Pulmonary/Chest: Effort normal and breath sounds normal.   Abdominal: Soft. Bowel sounds are normal.   Musculoskeletal: He exhibits no edema.   Neurological: He is alert and oriented to person, place, and time.       RESULTS   Results Review:    Results from last 7 days   Lab Units 19  0639 19  0554 19  0734  19  1948   SODIUM mmol/L 146* 145 141   < > 146*   POTASSIUM mmol/L 3.8 3.7 3.5   < > 4.2   CHLORIDE mmol/L 103 97* 98   < > 97*   CO2 mmol/L 31.0* 32.0* 32.0*   < > 39.0*   BUN mg/dL 59* 52* 53*   < > 44*   CREATININE mg/dL 2.52* 2.48* 2.30*   < > 1.60*   CALCIUM mg/dL 9.1 9.1 9.2   < > 9.2   BILIRUBIN mg/dL  --   --   --   --  0.4   ALK PHOS U/L  --   --   --   --  75 "   ALT (SGPT) U/L  --   --   --   --  10   AST (SGOT) U/L  --   --   --   --  17   GLUCOSE mg/dL 107* 74 81   < > 88    < > = values in this interval not displayed.       Estimated Creatinine Clearance: 21.2 mL/min (A) (by C-G formula based on SCr of 2.52 mg/dL (H)).                Results from last 7 days   Lab Units 04/09/19  0639 04/08/19  0554 04/07/19  0734 04/06/19  0621 04/05/19  1751 04/05/19  0847   WBC 10*3/mm3 7.39 10.10 9.46 11.40*  --  10.83*   HEMOGLOBIN g/dL 7.6* 7.1* 7.1* 7.5* 7.3* 7.0*   PLATELETS 10*3/mm3 283 257 242 229  --  227       Results from last 7 days   Lab Units 04/09/19  0639 04/08/19  0554 04/07/19  0734 04/06/19  0621 04/05/19  0810   INR  2.22* 2.29* 2.17* 2.42* 3.38*         Imaging Results (last 24 hours)     ** No results found for the last 24 hours. **           MEDICATIONS      atorvastatin 10 mg Oral Nightly   bacitracin  Topical Daily   diltiaZEM  mg Oral Nightly   ferrous sulfate 324 mg Oral BID With Meals   flecainide 50 mg Oral Q12H   folic acid 1 mg Oral Daily   ipratropium-albuterol 3 mL Nebulization 4x Daily - RT   levothyroxine 50 mcg Oral QAM AC   lidocaine  Urethral Once   magic butt ointment  Topical BID   methylPREDNISolone sodium succinate 40 mg Intravenous Q8H   sodium chloride 3 mL Intravenous Q12H   tamsulosin 0.4 mg Oral Nightly   cyanocobalamin 1,000 mcg Oral Daily       lactated ringers 75 mL/hr Last Rate: 75 mL/hr (04/09/19 0337)       Assessment/Plan   ASSESSMENT / PLAN      Acute on chronic respiratory failure with hypoxia and hypercapnia (CMS/HCC)    Atrial fibrillation [I48.91]    Long-term (current) use of anticoagulants    Hypertension    Right nephrolithiasis    Left kidney mass    Severe aortic stenosis    Prosthetic aortic valve stenosis    Pseudomonas sepsis (CMS/HCC)    Chronic bronchitis with acute exacerbation (CMS/HCC)    Coumadin toxicity    1. B[place] on CKD3- baseline creatinine close to 1.3.  It appears to be postrenal with recent gross  hematuria and may have some obstruction.  At the present moment urine is clear.  Continue bladder irrigation. Also cant rule rachid due to antibiotics. Tomi is + and I will keep iv solumedrol cr still 2.4-2.5 range. Overall poor prognosis with no po intake for past few days.     Na is high and I will change it to d5 half saline. Stop kcl.     His ultrasound is not showing any hydronephrosis but has multiple stone on the right side.  His left ureteral stent has been removed.     Long discussion with his daughter yesterday about overall poor prognosis. they like to try steroid for few days and reassess.    2.  Left lower lobe pneumonia with Pseudomonas bacteremia.  Patient received cefepime and now stopped     3.possible cystitis positive for Pseudomonas.  finished cefepime     4.chronic atrial fibrillation with supratherapeutic INR.  Patient's Coumadin is on hold.     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  He has prior Citrobacter and had received ceftriaxone in the last admission     6.. Anemia with fe deficiency anemia - he is currently on iron and B12.  s/p Procrit shot x1                   This document has been electronically signed by Angélica Heredia MD on April 9, 2019 12:29 PM

## 2019-04-09 NOTE — SIGNIFICANT NOTE
04/09/19 1157   Rehab Treatment   Discipline occupational therapist   Reason Treatment Not Performed unable to treat, medical status change  (RN reporting pt not appropriate for OT tx. Pt has been unable to participate in last 3 sessions. RN in agreement with d/c OT services at this time as pt is unable to participate. Re-consult when pt able to participate or if/when medically appropriate. )

## 2019-04-09 NOTE — PLAN OF CARE
Problem: Patient Care Overview  Goal: Plan of Care Review  Outcome: Ongoing (interventions implemented as appropriate)   04/09/19 0040   OTHER   Outcome Summary Patient continously sleeping. No signs/symptoms of discomfort. HR elevated at times. Other vitals stable. CBI currently running clear. Will continue to monitor.    Coping/Psychosocial   Plan of Care Reviewed With patient   Plan of Care Review   Progress declining     Goal: Individualization and Mutuality  Outcome: Ongoing (interventions implemented as appropriate)    Goal: Discharge Needs Assessment  Outcome: Ongoing (interventions implemented as appropriate)    Goal: Interprofessional Rounds/Family Conf  Outcome: Ongoing (interventions implemented as appropriate)      Problem: Fall Risk (Adult)  Goal: Absence of Fall  Outcome: Outcome(s) achieved Date Met: 04/09/19      Problem: Skin Injury Risk (Adult)  Goal: Skin Health and Integrity  Outcome: Ongoing (interventions implemented as appropriate)      Problem: Wound (Includes Pressure Injury) (Adult)  Goal: Signs and Symptoms of Listed Potential Problems Will be Absent, Minimized or Managed (Wound)  Outcome: Ongoing (interventions implemented as appropriate)      Problem: Pneumonia (Adult)  Goal: Signs and Symptoms of Listed Potential Problems Will be Absent, Minimized or Managed (Pneumonia)  Outcome: Ongoing (interventions implemented as appropriate)      Problem: Chronic Obstructive Pulmonary Disease (Adult)  Goal: Signs and Symptoms of Listed Potential Problems Will be Absent, Minimized or Managed (Chronic Obstructive Pulmonary Disease)  Outcome: Ongoing (interventions implemented as appropriate)      Problem: Urinary Tract Infection (Adult)  Goal: Signs and Symptoms of Listed Potential Problems Will be Absent, Minimized or Managed (Urinary Tract Infection)  Outcome: Ongoing (interventions implemented as appropriate)

## 2019-04-09 NOTE — SIGNIFICANT NOTE
04/09/19 1043   Rehab Treatment   Discipline physical therapist   Reason Treatment Not Performed unable to treat, medical status change  (RN reported pt not approriate for PT today. Today is third day in a row pt has not been approriate for PT treatment. RN was in agreement that PT would discharge at this time. Please re-order PT if/when medical status improves and PT will re-evaluate pt.)

## 2019-04-09 NOTE — PROGRESS NOTES
Progress Note  Daron Erwin MD  Hospitalist    Date of visit: 4/9/2019     LOS: 14 days   Patient Care Team:  Seth Arce MD as PCP - General  Macey Corrales APRN as PCP - Claims Attributed    Chief Complaint: remains non verbal    Subjective     Interval History:     Patient Complaints: remains non verbal, poorly responsive    History taken from: nursing / chart    Medication Review:   Current Facility-Administered Medications   Medication Dose Route Frequency Provider Last Rate Last Dose   • albuterol (PROVENTIL) nebulizer solution 0.083% 2.5 mg/3mL  2.5 mg Nebulization Q6H PRN Zina Francisco MD   2.5 mg at 04/07/19 0318   • bacitracin ointment   Topical Daily Guy Evans MD       • bisacodyl (DULCOLAX) suppository 10 mg  10 mg Rectal Daily PRN Austin Chaudhry MD   10 mg at 04/02/19 1458   • dextrose (D50W) 25 g/ 50mL Intravenous Solution 25 g  25 g Intravenous Q15 Min PRN Guy Evans MD   25 g at 04/08/19 2216   • dextrose (GLUTOSE) oral gel 15 g  15 g Oral Q15 Min PRN Guy Evans MD       • dextrose 5 % and sodium chloride 0.45 % infusion  75 mL/hr Intravenous Continuous Angélica Heredia MD 75 mL/hr at 04/09/19 1253 75 mL/hr at 04/09/19 1253   • diltiaZEM CD (CARDIZEM CD) 24 hr capsule 240 mg  240 mg Oral Nightly Guy Evans MD   240 mg at 04/05/19 2050   • ferrous sulfate EC tablet 324 mg  324 mg Oral BID With Meals Guy Evans MD   324 mg at 04/05/19 1838   • flecainide (TAMBOCOR) tablet 50 mg  50 mg Oral Q12H Guy Evans MD   50 mg at 04/05/19 2050   • folic acid (FOLVITE) tablet 1 mg  1 mg Oral Daily Guy Evans MD   1 mg at 04/05/19 0838   • glucagon (human recombinant) (GLUCAGEN DIAGNOSTIC) injection 1 mg  1 mg Subcutaneous PRN Guy Evans MD       • ipratropium-albuterol (DUO-NEB) nebulizer solution 3 mL  3 mL Nebulization 4x Daily - RT Guy Evans MD   3 mL at 04/09/19 1504   • levothyroxine (SYNTHROID, LEVOTHROID) tablet 50 mcg  50 mcg Oral QAM AC Nathan,  MD Guy   50 mcg at 04/05/19 0837   • lidocaine (XYLOCAINE) 2 % jelly    PRN Holland Johnson MD   10 mL at 04/02/19 1901   • lidocaine (XYLOCAINE) 2 % jelly   Urethral Once Holland Johnson MD       • magic butt ointment   Topical BID Guy Evans MD       • melatonin tablet 6 mg  6 mg Oral Nightly PRN Guy Evans MD   6 mg at 04/02/19 0024   • methylPREDNISolone sodium succinate (SOLU-Medrol) injection 20 mg  20 mg Intravenous Q8H Daron Erwin MD       • sodium chloride 0.9 % flush 10 mL  10 mL Intravenous PRN Demar iFnch MD   10 mL at 04/02/19 1015   • sodium chloride 0.9 % flush 3 mL  3 mL Intravenous Q12H Guy Evans MD   3 mL at 04/09/19 1009   • sodium chloride 0.9 % flush 3-10 mL  3-10 mL Intravenous PRN Guy Evans MD   10 mL at 04/09/19 1252   • tamsulosin (FLOMAX) 24 hr capsule 0.4 mg  0.4 mg Oral Nightly Guy Evans MD   0.4 mg at 04/05/19 2050   • vitamin B-12 (CYANOCOBALAMIN) tablet 1,000 mcg  1,000 mcg Oral Daily Guy Evans MD   1,000 mcg at 04/05/19 0838       Review of Systems:   Review of Systems   Unable to perform ROS: Patient nonverbal       Objective     Vital Signs  Temp:  [97.2 °F (36.2 °C)-99.5 °F (37.5 °C)] 97.6 °F (36.4 °C)  Heart Rate:  [] 98  Resp:  [18-28] 22  BP: (115-130)/(70-81) 115/75    Physical Exam:  Physical Exam   Constitutional: He appears cachectic. He appears ill. No distress.   HENT:   Head: Normocephalic and atraumatic.   Eyes: EOM are normal. Pupils are equal, round, and reactive to light. No scleral icterus.   Neck: Normal range of motion. Neck supple.   Cardiovascular: Normal rate and regular rhythm.   Pulmonary/Chest: Effort normal and breath sounds normal. No stridor. No respiratory distress.   Abdominal: Soft. Bowel sounds are normal. He exhibits no distension. There is no tenderness. No hernia.   Musculoskeletal: He exhibits no edema or tenderness.   Neurological: He displays atrophy. He exhibits abnormal muscle tone.   Opens  up his eyes / spastic weakenss   Skin: There is pallor.   Vitals reviewed.       Results Review:    Lab Results (last 24 hours)     Procedure Component Value Units Date/Time    POC Glucose Once [[id number]]  (Normal) Collected:  04/09/19 1647    Specimen:  Blood Updated:  04/09/19 1713     Glucose 126 mg/dL      Comment: RN NotifiedOperator: [id number] ELAINE Negronter ID: [id number]       POC Glucose Once [[id number]]  (Normal) Collected:  04/09/19 1023    Specimen:  Blood Updated:  04/09/19 1039     Glucose 120 mg/dL      Comment: RN NotifiedOperator: [id number] ELAINE Negronter ID: [id number]       Basic Metabolic Panel [[id number]]  (Abnormal) Collected:  04/09/19 0639    Specimen:  Blood Updated:  04/09/19 0923     Glucose 107 mg/dL      BUN 59 mg/dL      Creatinine 2.52 mg/dL      Sodium 146 mmol/L      Potassium 3.8 mmol/L      Chloride 103 mmol/L      CO2 31.0 mmol/L      Calcium 9.1 mg/dL      eGFR Non African Amer 25 mL/min/1.73      BUN/Creatinine Ratio 23.4     Anion Gap 12.0 mmol/L     Narrative:       GFR Normal >60  Chronic Kidney Disease <60  Kidney Failure <15    POC Glucose Once [[id number]]  (Normal) Collected:  04/09/19 0726    Specimen:  Blood Updated:  04/09/19 0751     Glucose 103 mg/dL      Comment: : [id number] ELAINE SCHMIDTDonter ID: [id number]       Protime-INR [[id number]]  (Abnormal) Collected:  04/09/19 0639    Specimen:  Blood Updated:  04/09/19 0725     Protime 23.7 Seconds      INR 2.22    Narrative:       Therapeutic range for most indications is 2.0-3.0 INR,  or 2.5-3.5 for mechanical heart valves.    CBC & Differential [[id number]] Collected:  04/09/19 0639    Specimen:  Blood Updated:  04/09/19 0704    Narrative:       The following orders were created for panel order CBC & Differential.  Procedure                               Abnormality         Status                     ---------                               -----------         ------                      CBC Auto Differential[[id number]]        Abnormal            Final result                 Please view results for these tests on the individual orders.    CBC Auto Differential [[id number]]  (Abnormal) Collected:  04/09/19 0639    Specimen:  Blood Updated:  04/09/19 0704     WBC 7.39 10*3/mm3      RBC 2.74 10*6/mm3      Hemoglobin 7.6 g/dL      Hematocrit 24.6 %      MCV 89.8 fL      MCH 27.7 pg      MCHC 30.9 g/dL      RDW 16.0 %      RDW-SD 52.1 fl      MPV 10.5 fL      Platelets 283 10*3/mm3      Neutrophil % 96.7 %      Lymphocyte % 1.9 %      Monocyte % 0.9 %      Eosinophil % 0.0 %      Basophil % 0.0 %      Immature Grans % 0.5 %      Neutrophils, Absolute 7.14 10*3/mm3      Lymphocytes, Absolute 0.14 10*3/mm3      Monocytes, Absolute 0.07 10*3/mm3      Eosinophils, Absolute 0.00 10*3/mm3      Basophils, Absolute 0.00 10*3/mm3      Immature Grans, Absolute 0.04 10*3/mm3      nRBC 0.0 /100 WBC     POC Glucose Once [[id number]]  (Normal) Collected:  04/09/19 0411    Specimen:  Blood Updated:  04/09/19 0554     Glucose 105 mg/dL      Comment: : [id number] [facility] ID: [id number]       POC Glucose Once [[id number]]  (Normal) Collected:  04/08/19 2343    Specimen:  Blood Updated:  04/09/19 0554     Glucose 125 mg/dL      Comment: RN NotifiedOperator: [id number] GAGANDEEP Godoy ID: [id number]       POC Glucose Once [[id number]]  (Abnormal) Collected:  04/08/19 2039    Specimen:  Blood Updated:  04/09/19 0554     Glucose 62 mg/dL      Comment: RN NotifiedOperator: [id number] GAGANDEEP Godoy ID: [id number]       Blood Culture - Blood, Arm, Right [[id number]] Collected:  04/03/19 1938    Specimen:  Blood from Arm, Right Updated:  04/08/19 2000     Blood Culture No growth at 5 days    Blood Culture - Blood, Arm, Left [[id number]] Collected:  04/03/19 1948    Specimen:  Blood from Arm, Left Updated:  04/08/19 2000     Blood Culture No growth at 5 days          Imaging Results (last 24 hours)      ** No results found for the last 24 hours. **          Assessment/Plan       Acute on chronic respiratory failure with hypoxia and hypercapnia (CMS/HCC)    Pseudomonas sepsis (CMS/HCC)    Chronic bronchitis with acute exacerbation (CMS/HCC)    Atrial fibrillation [I48.91]    Right nephrolithiasis    Left kidney mass    Severe aortic stenosis    Prosthetic aortic valve stenosis    CKD (chronic kidney disease) stage 4, GFR 15-29 ml/min (CMS/HCC)    Anemia, chronic disease    Long-term (current) use of anticoagulants    Hypertension    Coumadin toxicity    Continue with the current therapy, supportive care. Poor overall prognosis    Daron Erwin MD  04/09/19  5:22 PM

## 2019-04-10 NOTE — PLAN OF CARE
Problem: Patient Care Overview  Goal: Plan of Care Review  Outcome: Ongoing (interventions implemented as appropriate)   04/10/19 0883   OTHER   Outcome Summary Patient currently resting. No signs/symptoms of discomfort. CBI running fairly slow. VSS. Will continue to monitor.    Coping/Psychosocial   Plan of Care Reviewed With patient   Plan of Care Review   Progress declining     Goal: Individualization and Mutuality  Outcome: Ongoing (interventions implemented as appropriate)    Goal: Discharge Needs Assessment  Outcome: Ongoing (interventions implemented as appropriate)    Goal: Interprofessional Rounds/Family Conf  Outcome: Ongoing (interventions implemented as appropriate)      Problem: Fall Risk (Adult)  Goal: Absence of Fall  Outcome: Outcome(s) achieved Date Met: 04/10/19      Problem: Skin Injury Risk (Adult)  Goal: Skin Health and Integrity  Outcome: Ongoing (interventions implemented as appropriate)      Problem: Wound (Includes Pressure Injury) (Adult)  Goal: Signs and Symptoms of Listed Potential Problems Will be Absent, Minimized or Managed (Wound)  Outcome: Ongoing (interventions implemented as appropriate)      Problem: Pneumonia (Adult)  Goal: Signs and Symptoms of Listed Potential Problems Will be Absent, Minimized or Managed (Pneumonia)  Outcome: Ongoing (interventions implemented as appropriate)      Problem: Chronic Obstructive Pulmonary Disease (Adult)  Goal: Signs and Symptoms of Listed Potential Problems Will be Absent, Minimized or Managed (Chronic Obstructive Pulmonary Disease)  Outcome: Ongoing (interventions implemented as appropriate)      Problem: Urinary Tract Infection (Adult)  Goal: Signs and Symptoms of Listed Potential Problems Will be Absent, Minimized or Managed (Urinary Tract Infection)  Outcome: Ongoing (interventions implemented as appropriate)

## 2019-04-10 NOTE — PROGRESS NOTES
"   LOS: 15 days   Patient Care Team:  Seth Arce MD as PCP - General  Almaz Hightower APRN as PCP - Claims Attributed    Subjective     Subjective:  Symptoms:  Worsening.  No shortness of breath.  (Some pink sediment in tubing. Daughter and grandson at bedside. ).    Diet:  NPO.  No nausea or vomiting.        History taken from: patient chart RN    Objective     Vital Signs  Temp:  [96 °F (35.6 °C)-97.3 °F (36.3 °C)] 96.2 °F (35.7 °C)  Heart Rate:  [] 56  Resp:  [14-20] 16  BP: ()/(57-70) 127/66    Objective:  General Appearance:  In no acute distress.    Vital signs: (most recent): Blood pressure 127/66, pulse 56, temperature 96.2 °F (35.7 °C), temperature source Axillary, resp. rate 16, height (P) 175.3 cm (69.02\"), weight (P) 66.2 kg (145 lb 15.1 oz), SpO2 92 %.  Vital signs are normal.  No fever.    Output: Producing urine (Arita no clots).    HEENT: Normal HEENT exam.    Lungs:  Normal effort and normal respiratory rate.  There are decreased breath sounds.    Heart: Normal rate.  Regular rhythm.  S1 normal and S2 normal.  Positive for murmur.    Chest: Symmetric chest wall expansion.   Abdomen: Abdomen is soft and non-distended.  Bowel sounds are normal.   There is no abdominal tenderness.     Extremities: Normal range of motion.    Pulses: Distal pulses are intact.    Neurological: Patient is alert and oriented to person, place and time.  GCS score is 15.    Pupils:  Pupils are equal, round, and reactive to light.    Skin:  Warm, dry and pale.  No rash, ecchymosis or cyanosis.             Results Review:    Lab Results (last 24 hours)     Procedure Component Value Units Date/Time    POC Glucose Once [[id number]]  (Abnormal) Collected:  04/10/19 1030    Specimen:  Blood Updated:  04/10/19 1045     Glucose 161 mg/dL      Comment: RN NotifiedOperator: [id number] ELAINE Abebe ID: [id number]       Basic Metabolic Panel [[id number]]  (Abnormal) Collected:  04/10/19 0739    Specimen:  " Blood Updated:  04/10/19 0826     Glucose 162 mg/dL      BUN 65 mg/dL      Creatinine 2.73 mg/dL      Sodium 143 mmol/L      Potassium 3.6 mmol/L      Chloride 103 mmol/L      CO2 33.0 mmol/L      Calcium 8.9 mg/dL      eGFR Non African Amer 22 mL/min/1.73      BUN/Creatinine Ratio 23.8     Anion Gap 7.0 mmol/L     Narrative:       GFR Normal >60  Chronic Kidney Disease <60  Kidney Failure <15    POC Glucose Once [[id number]]  (Abnormal) Collected:  04/10/19 0712    Specimen:  Blood Updated:  04/10/19 0728     Glucose 142 mg/dL      Comment: RN NotifiedOperator: [id number] ELAINE MOOREJeff ID: [id number]       Protime-INR [[id number]]  (Abnormal) Collected:  04/10/19 0605    Specimen:  Blood Updated:  04/10/19 0710     Protime 24.5 Seconds      INR 2.32    Narrative:       Therapeutic range for most indications is 2.0-3.0 INR,  or 2.5-3.5 for mechanical heart valves.    CBC & Differential [[id number]] Collected:  04/10/19 0605    Specimen:  Blood Updated:  04/10/19 0708    Narrative:       The following orders were created for panel order CBC & Differential.  Procedure                               Abnormality         Status                     ---------                               -----------         ------                     CBC Auto Differential[[id number]]        Abnormal            Final result                 Please view results for these tests on the individual orders.    CBC Auto Differential [[id number]]  (Abnormal) Collected:  04/10/19 0605    Specimen:  Blood Updated:  04/10/19 0708     WBC 10.73 10*3/mm3      RBC 2.47 10*6/mm3      Hemoglobin 6.8 g/dL      Hematocrit 22.7 %      MCV 91.9 fL      MCH 27.5 pg      MCHC 30.0 g/dL      RDW 16.0 %      RDW-SD 53.4 fl      MPV 10.8 fL      Platelets 230 10*3/mm3      Neutrophil % 94.5 %      Lymphocyte % 2.2 %      Monocyte % 3.0 %      Eosinophil % 0.0 %      Basophil % 0.0 %      Immature Grans % 0.3 %      Neutrophils, Absolute 10.14 10*3/mm3       Lymphocytes, Absolute 0.24 10*3/mm3      Monocytes, Absolute 0.32 10*3/mm3      Eosinophils, Absolute 0.00 10*3/mm3      Basophils, Absolute 0.00 10*3/mm3      Immature Grans, Absolute 0.03 10*3/mm3      nRBC 0.0 /100 WBC     POC Glucose Once [[id number]]  (Abnormal) Collected:  04/09/19 1941    Specimen:  Blood Updated:  04/09/19 2005     Glucose 146 mg/dL      Comment: RN NotifiedOperator: [id number] GAGANDEEP Godoy ID: [id number]       POC Glucose Once [[id number]]  (Normal) Collected:  04/09/19 1647    Specimen:  Blood Updated:  04/09/19 1713     Glucose 126 mg/dL      Comment: RN NotifiedOperator: [id number] ELAINE Abebe ID: [id number]            Imaging Results (last 24 hours)     ** No results found for the last 24 hours. **           I reviewed the patient's new clinical results.  I reviewed the patient's new imaging results and agree with the interpretation.  I reviewed the patient's other test results and agree with the interpretation      Assessment/Plan       Acute on chronic respiratory failure with hypoxia and hypercapnia (CMS/HCC)    Atrial fibrillation [I48.91]    Long-term (current) use of anticoagulants    Hypertension    Right nephrolithiasis    Left kidney mass    Severe aortic stenosis    Prosthetic aortic valve stenosis    Pseudomonas sepsis (CMS/HCC)    Chronic bronchitis with acute exacerbation (CMS/HCC)    Coumadin toxicity    CKD (chronic kidney disease) stage 4, GFR 15-29 ml/min (CMS/HCC)    Anemia, chronic disease      Assessment & Plan    1. Bacteremia  2. UTI cystitis   3. History of left hydronephrosis with J-stent in place  4. Gross hematuria no clots post op/post Arita placement-->nearly resolved with CBI minimally running  5. Retention of urine, Arita anchored 4/3/19  -2/28/19 cystoscopy left ureteroscopy (findings: ), J-stent placed--pathology is negative for neoplasm  -4/2/19 cystoscopy and left J-stent removal    -Estimated Creatinine Clearance: 19.5 mL/min (A)  (by C-G formula based on SCr of 2.73 mg/dL (H)).   -Cr 2.73  -INR 2.32 (Warfarin on hold)  -Hgb/Hct 7.1/22.8-->7.6/24.6-->6.8/22.7  -3/26/19 urine and blood cultures positive for Pseudomonas aeruginosa  -Repeat urine culture 4/3/19 negative and blood cultures in progress   -Flomax -->NPO  -cultures negative 4/3/19     Plan:   Continue LIZET Varghese  04/10/19  4:36 PM

## 2019-04-10 NOTE — PROGRESS NOTES
Progress Note  Daron Erwin MD  Hospitalist    Date of visit: 4/10/2019     LOS: 15 days   Patient Care Team:  Seth Arce MD as PCP - General  Macey Corrales APRN as PCP - Claims Attributed    Chief Complaint: remains non verbal    Subjective     Interval History:     Patient Complaints: a bit more responsive - opens up his eyes    History taken from: nursing / chart    Medication Review:   Current Facility-Administered Medications   Medication Dose Route Frequency Provider Last Rate Last Dose   • albuterol (PROVENTIL) nebulizer solution 0.083% 2.5 mg/3mL  2.5 mg Nebulization Q6H PRN Zina Francisco MD   2.5 mg at 04/07/19 0318   • bacitracin ointment   Topical Daily Guy Evans MD       • bisacodyl (DULCOLAX) suppository 10 mg  10 mg Rectal Daily PRN Austin Chaudhry MD   10 mg at 04/02/19 1458   • dextrose (D50W) 25 g/ 50mL Intravenous Solution 25 g  25 g Intravenous Q15 Min PRN Guy Evans MD   25 g at 04/08/19 2216   • dextrose (GLUTOSE) oral gel 15 g  15 g Oral Q15 Min PRN Guy Evans MD       • dextrose 5 % and sodium chloride 0.45 % infusion  75 mL/hr Intravenous Continuous Angélica Heredia MD 75 mL/hr at 04/10/19 0149 75 mL/hr at 04/10/19 0149   • diltiaZEM CD (CARDIZEM CD) 24 hr capsule 240 mg  240 mg Oral Nightly Guy Evans MD   240 mg at 04/05/19 2050   • ferrous sulfate EC tablet 324 mg  324 mg Oral BID With Meals Guy Evans MD   324 mg at 04/05/19 1838   • flecainide (TAMBOCOR) tablet 50 mg  50 mg Oral Q12H Guy Evans MD   50 mg at 04/05/19 2050   • folic acid (FOLVITE) tablet 1 mg  1 mg Oral Daily Guy Evans MD   1 mg at 04/05/19 0838   • glucagon (human recombinant) (GLUCAGEN DIAGNOSTIC) injection 1 mg  1 mg Subcutaneous PRN Guy Evans MD       • ipratropium-albuterol (DUO-NEB) nebulizer solution 3 mL  3 mL Nebulization 4x Daily - RT Guy Evans MD   3 mL at 04/10/19 1141   • levothyroxine (SYNTHROID, LEVOTHROID) tablet 50 mcg  50 mcg Oral QAM AC  Guy Evans MD   50 mcg at 04/05/19 0837   • lidocaine (XYLOCAINE) 2 % jelly    PRN Holland Johnson MD   10 mL at 04/02/19 1901   • lidocaine (XYLOCAINE) 2 % jelly   Urethral Once Holland Johnson MD       • magic butt ointment   Topical BID Guy Evans MD       • melatonin tablet 6 mg  6 mg Oral Nightly PRN Guy Evans MD   6 mg at 04/02/19 0024   • methylPREDNISolone sodium succinate (SOLU-Medrol) injection 20 mg  20 mg Intravenous Q8H Daron Erwin MD   20 mg at 04/10/19 0425   • sodium chloride 0.9 % flush 10 mL  10 mL Intravenous PRN Demar Finch MD   10 mL at 04/02/19 1015   • sodium chloride 0.9 % flush 3 mL  3 mL Intravenous Q12H Guy Evans MD   3 mL at 04/09/19 2047   • sodium chloride 0.9 % flush 3-10 mL  3-10 mL Intravenous PRN Guy Evans MD   10 mL at 04/09/19 1252   • tamsulosin (FLOMAX) 24 hr capsule 0.4 mg  0.4 mg Oral Nightly Guy Evans MD   0.4 mg at 04/05/19 2050   • vitamin B-12 (CYANOCOBALAMIN) tablet 1,000 mcg  1,000 mcg Oral Daily Guy Evans MD   1,000 mcg at 04/05/19 0838       Review of Systems:   Review of Systems   Unable to perform ROS: Patient nonverbal       Objective     Vital Signs  Temp:  [96 °F (35.6 °C)-97.6 °F (36.4 °C)] 96 °F (35.6 °C)  Heart Rate:  [] 113  Resp:  [14-22] 16  BP: ()/(57-75) 130/60    Physical Exam:  Physical Exam   Constitutional: He appears cachectic. He appears ill. No distress.   HENT:   Head: Normocephalic and atraumatic.   Eyes: EOM are normal. Pupils are equal, round, and reactive to light. No scleral icterus.   Neck: Normal range of motion. Neck supple.   Cardiovascular: Normal rate and regular rhythm.   Pulmonary/Chest: Effort normal and breath sounds normal. No stridor. No respiratory distress.   Abdominal: Soft. Bowel sounds are normal. He exhibits no distension. There is no tenderness. No hernia.   Musculoskeletal: He exhibits no edema or tenderness.   Neurological: He displays atrophy. He exhibits  abnormal muscle tone.   Opens up his eyes / spastic weakenss   Skin: There is pallor.   Vitals reviewed.       Results Review:    Lab Results (last 24 hours)     Procedure Component Value Units Date/Time    POC Glucose Once [[id number]]  (Abnormal) Collected:  04/10/19 1030    Specimen:  Blood Updated:  04/10/19 1045     Glucose 161 mg/dL      Comment: RN NotifiedOperator: [id number] ELAINE Abebe ID: [id number]       Basic Metabolic Panel [[id number]]  (Abnormal) Collected:  04/10/19 0739    Specimen:  Blood Updated:  04/10/19 0826     Glucose 162 mg/dL      BUN 65 mg/dL      Creatinine 2.73 mg/dL      Sodium 143 mmol/L      Potassium 3.6 mmol/L      Chloride 103 mmol/L      CO2 33.0 mmol/L      Calcium 8.9 mg/dL      eGFR Non African Amer 22 mL/min/1.73      BUN/Creatinine Ratio 23.8     Anion Gap 7.0 mmol/L     Narrative:       GFR Normal >60  Chronic Kidney Disease <60  Kidney Failure <15    POC Glucose Once [[id number]]  (Abnormal) Collected:  04/10/19 0712    Specimen:  Blood Updated:  04/10/19 0728     Glucose 142 mg/dL      Comment: RN NotifiedOperator: [id number] ELAINE Abebe ID: [id number]       Protime-INR [[id number]]  (Abnormal) Collected:  04/10/19 0605    Specimen:  Blood Updated:  04/10/19 0710     Protime 24.5 Seconds      INR 2.32    Narrative:       Therapeutic range for most indications is 2.0-3.0 INR,  or 2.5-3.5 for mechanical heart valves.    CBC & Differential [[id number]] Collected:  04/10/19 0605    Specimen:  Blood Updated:  04/10/19 0708    Narrative:       The following orders were created for panel order CBC & Differential.  Procedure                               Abnormality         Status                     ---------                               -----------         ------                     CBC Auto Differential[[id number]]        Abnormal            Final result                 Please view results for these tests on the individual orders.    CBC Auto Differential  [[id number]]  (Abnormal) Collected:  04/10/19 0605    Specimen:  Blood Updated:  04/10/19 0708     WBC 10.73 10*3/mm3      RBC 2.47 10*6/mm3      Hemoglobin 6.8 g/dL      Hematocrit 22.7 %      MCV 91.9 fL      MCH 27.5 pg      MCHC 30.0 g/dL      RDW 16.0 %      RDW-SD 53.4 fl      MPV 10.8 fL      Platelets 230 10*3/mm3      Neutrophil % 94.5 %      Lymphocyte % 2.2 %      Monocyte % 3.0 %      Eosinophil % 0.0 %      Basophil % 0.0 %      Immature Grans % 0.3 %      Neutrophils, Absolute 10.14 10*3/mm3      Lymphocytes, Absolute 0.24 10*3/mm3      Monocytes, Absolute 0.32 10*3/mm3      Eosinophils, Absolute 0.00 10*3/mm3      Basophils, Absolute 0.00 10*3/mm3      Immature Grans, Absolute 0.03 10*3/mm3      nRBC 0.0 /100 WBC     POC Glucose Once [[id number]]  (Abnormal) Collected:  04/09/19 1941    Specimen:  Blood Updated:  04/09/19 2005     Glucose 146 mg/dL      Comment: RN NotifiedOperator: [id number] GAGANDEEP KO[facility]Paul [facility] ID: [id number]       POC Glucose Once [[id number]]  (Normal) Collected:  04/09/19 1647    Specimen:  Blood Updated:  04/09/19 1713     Glucose 126 mg/dL      Comment: RN NotifiedOperator: [id number] GRACIELAALEX DICKSONEllen ID: [id number]             Imaging Results (last 24 hours)     ** No results found for the last 24 hours. **          Assessment/Plan       Acute on chronic respiratory failure with hypoxia and hypercapnia (CMS/HCC)    Pseudomonas sepsis (CMS/HCC)    Chronic bronchitis with acute exacerbation (CMS/HCC)    Atrial fibrillation [I48.91]    Right nephrolithiasis    Left kidney mass    Severe aortic stenosis    Prosthetic aortic valve stenosis    CKD (chronic kidney disease) stage 4, GFR 15-29 ml/min (CMS/HCC)    Anemia, chronic disease    Long-term (current) use of anticoagulants    Hypertension    Coumadin toxicity    Continue with the current therapy, supportive care. Poor overall prognosis. After discussing with his family, they have agreed upon Hospice / comfort  care.    Daron Erwin MD  04/10/19  1:49 PM

## 2019-04-10 NOTE — PLAN OF CARE
"Problem: Patient Care Overview  Goal: Plan of Care Review  Outcome: Ongoing (interventions implemented as appropriate)   04/09/19 7704   OTHER   Outcome Summary Pt has been opening eyes more today, but still unable to speak. CBI has had some dark \"old\" blood and clots noted. Adjusted rate until more clear. slowed down at this time. will continue to monitor.    Coping/Psychosocial   Plan of Care Reviewed With patient;daughter   Plan of Care Review   Progress declining     Goal: Individualization and Mutuality  Outcome: Ongoing (interventions implemented as appropriate)    Goal: Discharge Needs Assessment  Outcome: Ongoing (interventions implemented as appropriate)    Goal: Interprofessional Rounds/Family Conf  Outcome: Ongoing (interventions implemented as appropriate)      Problem: Fall Risk (Adult)  Goal: Absence of Fall  Outcome: Ongoing (interventions implemented as appropriate)      Problem: Skin Injury Risk (Adult)  Goal: Skin Health and Integrity  Outcome: Ongoing (interventions implemented as appropriate)      Problem: Wound (Includes Pressure Injury) (Adult)  Goal: Signs and Symptoms of Listed Potential Problems Will be Absent, Minimized or Managed (Wound)  Outcome: Ongoing (interventions implemented as appropriate)      Problem: Pneumonia (Adult)  Goal: Signs and Symptoms of Listed Potential Problems Will be Absent, Minimized or Managed (Pneumonia)  Outcome: Ongoing (interventions implemented as appropriate)      Problem: Chronic Obstructive Pulmonary Disease (Adult)  Goal: Signs and Symptoms of Listed Potential Problems Will be Absent, Minimized or Managed (Chronic Obstructive Pulmonary Disease)  Outcome: Ongoing (interventions implemented as appropriate)      Problem: Urinary Tract Infection (Adult)  Goal: Signs and Symptoms of Listed Potential Problems Will be Absent, Minimized or Managed (Urinary Tract Infection)  Outcome: Ongoing (interventions implemented as appropriate)        "

## 2019-04-10 NOTE — NURSING NOTE
Blood completed at this time. No s/s of adverse reactions noted. VS obtained. Will continue to monitor. MD aware of MEWS score. Family conference schedule for this afternoon.

## 2019-04-10 NOTE — CONSULTS
Called referral to [facility].  They will contact dtr (POA) and will come to evaluate pt.

## 2019-04-10 NOTE — PROGRESS NOTES
"[facility]  [address] [place] [address]. [address]  T - [phone]  F - [phone]     Progress Note          PATIENT  DEMOGRAPHICS   PATIENT NAME: Seth Montiel                      PHYSICIAN: Angélica Heredia MD  : 1935  MRN: [id number]   LOS: 15 days    Patient Care Team:  Seth Arce MD as PCP - General  Macey Corrales APRN as PCP - Claims Attributed  Subjective   SUBJECTIVE   Pt is resting some resp distress not responding to any questions         Objective   OBJECTIVE   Vital Signs  Temp:  [96.1 °F (35.6 °C)-97.6 °F (36.4 °C)] 96.1 °F (35.6 °C)  Heart Rate:  [] 98  Resp:  [14-22] 16  BP: ()/(57-75) 109/59    Flowsheet Rows      First Filed Value   Admission Height  175.3 cm (69\") Documented at 2019 1642   Admission Weight  71.6 kg (157 lb 12.8 oz) Documented at 2019 1642           I/O last 3 completed shifts:  In: 2650 [I.V.:2600; IV Piggyback:50]  Out: -650     PHYSICAL EXAM    Physical Exam   Constitutional: He is oriented to person, place, and time. He appears well-developed.   HENT:   Head: Normocephalic.   Eyes: Pupils are equal, round, and reactive to light.   Cardiovascular: Normal rate, regular rhythm and normal heart sounds.   Pulmonary/Chest: Effort normal and breath sounds normal.   Abdominal: Soft. Bowel sounds are normal.   Musculoskeletal: He exhibits no edema.   Neurological: He is alert and oriented to person, place, and time.       RESULTS   Results Review:    Results from last 7 days   Lab Units 04/10/19  0739 19  0639 19  0554  19  1948   SODIUM mmol/L 143 146* 145   < > 146*   POTASSIUM mmol/L 3.6 3.8 3.7   < > 4.2   CHLORIDE mmol/L 103 103 97*   < > 97*   CO2 mmol/L 33.0* 31.0* 32.0*   < > 39.0*   BUN mg/dL 65* 59* 52*   < > 44*   CREATININE mg/dL 2.73* 2.52* 2.48*   < > 1.60*   CALCIUM mg/dL 8.9 9.1 9.1   < > 9.2   BILIRUBIN mg/dL  --   --   --   --  0.4   ALK PHOS U/L  --   --   --   --  75 "   ALT (SGPT) U/L  --   --   --   --  10   AST (SGOT) U/L  --   --   --   --  17   GLUCOSE mg/dL 162* 107* 74   < > 88    < > = values in this interval not displayed.       Estimated Creatinine Clearance: 19.5 mL/min (A) (by C-G formula based on SCr of 2.73 mg/dL (H)).                Results from last 7 days   Lab Units 04/10/19  0605 04/09/19  0639 04/08/19  0554 04/07/19 0734 04/06/19 0621   WBC 10*3/mm3 10.73 7.39 10.10 9.46 11.40*   HEMOGLOBIN g/dL 6.8* 7.6* 7.1* 7.1* 7.5*   PLATELETS 10*3/mm3 230 283 257 242 229       Results from last 7 days   Lab Units 04/10/19  0605 04/09/19  0639 04/08/19  0554 04/07/19 0734 04/06/19 0621   INR  2.32* 2.22* 2.29* 2.17* 2.42*         Imaging Results (last 24 hours)     ** No results found for the last 24 hours. **           MEDICATIONS      bacitracin  Topical Daily   diltiaZEM  mg Oral Nightly   ferrous sulfate 324 mg Oral BID With Meals   flecainide 50 mg Oral Q12H   folic acid 1 mg Oral Daily   ipratropium-albuterol 3 mL Nebulization 4x Daily - RT   levothyroxine 50 mcg Oral QAM AC   lidocaine  Urethral Once   magic butt ointment  Topical BID   methylPREDNISolone sodium succinate 20 mg Intravenous Q8H   sodium chloride 3 mL Intravenous Q12H   tamsulosin 0.4 mg Oral Nightly   cyanocobalamin 1,000 mcg Oral Daily       dextrose 5 % and sodium chloride 0.45 % 75 mL/hr Last Rate: 75 mL/hr (04/10/19 0149)       Assessment/Plan   ASSESSMENT / PLAN      Acute on chronic respiratory failure with hypoxia and hypercapnia (CMS/HCC)    Atrial fibrillation [I48.91]    Long-term (current) use of anticoagulants    Hypertension    Right nephrolithiasis    Left kidney mass    Severe aortic stenosis    Prosthetic aortic valve stenosis    Pseudomonas sepsis (CMS/HCC)    Chronic bronchitis with acute exacerbation (CMS/HCC)    Coumadin toxicity    CKD (chronic kidney disease) stage 4, GFR 15-29 ml/min (CMS/HCC)    Anemia, chronic disease    1. [place] on CKD3- baseline creatinine close to  1.3.  It appears to be postrenal with recent gross hematuria and may have some obstruction.  At the present moment urine is clear.  Continue bladder irrigation. Also cant rule rachid due to antibiotics. Tomi is + and I will keep iv solumedrol, cr is worsening and now upto 2.73. Dw Pt daughter and the family is going to discuss among themselves and plan for further care. Explained poor prognosis. Na is better. Not a candidate for dialysis    His ultrasound is not showing any hydronephrosis but has multiple stone on the right side.  His left ureteral stent has been removed.     2.  Left lower lobe pneumonia with Pseudomonas bacteremia.  Patient received cefepime and now stopped     3.possible cystitis positive for Pseudomonas.  finished cefepime     4.chronic atrial fibrillation with supratherapeutic INR.  Patient's Coumadin is on hold.     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  He has prior Citrobacter and had received ceftriaxone in the last admission     6.. Anemia with fe deficiency anemia - he is currently on iron and B12.  s/p Procrit shot x1                   This document has been electronically signed by Angélica Heredia MD on April 10, 2019 11:59 AM

## 2019-04-10 NOTE — PLAN OF CARE
Problem: Patient Care Overview  Goal: Plan of Care Review  Outcome: Ongoing (interventions implemented as appropriate)   04/10/19 1105   Coping/Psychosocial   Plan of Care Reviewed With patient   Plan of Care Review   Progress declining

## 2019-04-10 NOTE — NURSING NOTE
Alyssa Salcedo contacted regarding q 2 hours Morphine scheduled. Daughter and granddaughter stated to only administer morphine if complains of pain. Family stated they don't think he is at that point at this time. Signee assessed patient. No signs and sypmtoms of pain at this time. Patient asked and patient nodded no to pain. Will continue to monitor and pass along to next shift.

## 2019-04-10 NOTE — NURSING NOTE
1500 - Met with patient POA/daughter, Alyssa and son-in-law.  Hospice information given.  Explained patient is eligible for Hospice services, but not appropriate for GIP level of care at this time due to controlled symptoms.  Voices understanding.  ANNETTE Davis CM,  CHIARA Terrell, WILLIAM and Dr. Erwin aware of hospice consult findings.  Will re-evaluate tomorrow if indicated or requested.

## 2019-04-11 NOTE — PROGRESS NOTES
Progress Note  Daron Erwin MD  Hospitalist    Date of visit: 4/11/2019     LOS: 16 days   Patient Care Team:  Seth Arce MD as PCP - General  Almaz Hightower APRN as PCP - Claims Attributed    Chief Complaint: remains non verbal    Subjective     Interval History:     Patient Complaints: a bit more responsive - opens up his eyes    History taken from: nursing / chart    Medication Review:   Current Facility-Administered Medications   Medication Dose Route Frequency Provider Last Rate Last Dose   • albuterol (PROVENTIL) nebulizer solution 0.083% 2.5 mg/3mL  2.5 mg Nebulization Q6H PRN Zina Francisco MD   2.5 mg at 04/07/19 0318   • bacitracin ointment   Topical Daily Guy Evans MD       • bisacodyl (DULCOLAX) suppository 10 mg  10 mg Rectal Daily PRN Austin Chaudhry MD   10 mg at 04/02/19 1458   • lidocaine (XYLOCAINE) 2 % jelly    PRN Holland Johnson MD   10 mL at 04/02/19 1901   • LORazepam (ATIVAN) injection 0.5 mg  0.5 mg Intravenous Q4H PRN Daron Erwin MD       • magic butt ointment   Topical BID Guy Evans MD       • morphine injection 1 mg  1 mg Intravenous Q2H Daron Erwin MD   1 mg at 04/11/19 0618   • Scopolamine (TRANSDERM-SCOP) 1.5 MG/3DAYS patch 1 patch  1 patch Transdermal Q72H Daron Erwin MD   1 patch at 04/11/19 1125   • sodium chloride 0.9 % flush 10 mL  10 mL Intravenous PRN Demar Finch MD   10 mL at 04/02/19 1015   • sodium chloride 0.9 % flush 3 mL  3 mL Intravenous Q12H Guy Evans MD   3 mL at 04/09/19 2047   • sodium chloride 0.9 % flush 3-10 mL  3-10 mL Intravenous PRN Guy Evans MD   10 mL at 04/09/19 1252       Review of Systems:   Review of Systems   Unable to perform ROS: Patient nonverbal       Objective     Vital Signs  Temp:  [96.5 °F (35.8 °C)-97.3 °F (36.3 °C)] 96.9 °F (36.1 °C)  Heart Rate:  [] 100  Resp:  [16-18] 16  BP: (105-121)/(64-86) 118/64    Physical Exam:  Physical Exam   Constitutional: He appears cachectic. He  appears ill. No distress.   HENT:   Head: Normocephalic and atraumatic.   Eyes: EOM are normal. Pupils are equal, round, and reactive to light. No scleral icterus.   Neck: Normal range of motion. Neck supple.   Cardiovascular: Normal rate and regular rhythm.   Pulmonary/Chest: Effort normal and breath sounds normal. No stridor. No respiratory distress.   Abdominal: Soft. Bowel sounds are normal. He exhibits no distension. There is no tenderness. No hernia.   Musculoskeletal: He exhibits no edema or tenderness.   Neurological: He displays atrophy. He exhibits abnormal muscle tone.   Opens up his eyes / spastic weakenss   Skin: There is pallor.   Vitals reviewed.       Results Review:    Lab Results (last 24 hours)     Procedure Component Value Units Date/Time    POC Glucose Once [[id number]]  (Normal) Collected:  04/11/19 0736    Specimen:  Blood Updated:  04/11/19 0751     Glucose 123 mg/dL      Comment: RN NotifiedOperator: [id number] NATALI WESTONNIFERMeter ID: [id number]       POC Glucose Once [[id number]]  (Abnormal) Collected:  04/10/19 1923    Specimen:  Blood Updated:  04/10/19 1948     Glucose 144 mg/dL      Comment: RN NotifiedOperator: [id number] GAGANDEEP SARAHMeter ID: [id number]       POC Glucose Once [[id number]]  (Abnormal) Collected:  04/10/19 1621    Specimen:  Blood Updated:  04/10/19 1637     Glucose 147 mg/dL      Comment: RN NotifiedOperator: [id number] GRACIELAALEX DICKSONEllen ID: [id number]             Imaging Results (last 24 hours)     ** No results found for the last 24 hours. **          Assessment/Plan       Acute on chronic respiratory failure with hypoxia and hypercapnia (CMS/HCC)    Pseudomonas sepsis (CMS/HCC)    Chronic bronchitis with acute exacerbation (CMS/HCC)    Atrial fibrillation [I48.91]    Right nephrolithiasis    Left kidney mass    Severe aortic stenosis    Prosthetic aortic valve stenosis    CKD (chronic kidney disease) stage 4, GFR 15-29 ml/min (CMS/HCC)    Anemia, chronic  disease    Long-term (current) use of anticoagulants    Hypertension    Coumadin toxicity    After discussing with his family, they have agreed upon Hospice / comfort care. Stop finger sticks, blood draws, CBI, IV medications.     Daron Erwin MD  04/11/19  4:15 PM

## 2019-04-11 NOTE — PLAN OF CARE
Problem: Patient Care Overview  Goal: Plan of Care Review  Outcome: Ongoing (interventions implemented as appropriate)   04/11/19 0057   OTHER   Outcome Summary Pt's O2 around 84% on 4L NC. Patient is mouth breathing. Selena, RRT contacted and patient placed on a venturi mask. O2 currently at 94%. Patient is resting with no signs/symptoms of discomfort. Patient denies any pain. VSS. Will continue to monitor.    Coping/Psychosocial   Plan of Care Reviewed With patient   Plan of Care Review   Progress declining     Goal: Individualization and Mutuality  Outcome: Ongoing (interventions implemented as appropriate)    Goal: Discharge Needs Assessment  Outcome: Ongoing (interventions implemented as appropriate)    Goal: Interprofessional Rounds/Family Conf  Outcome: Ongoing (interventions implemented as appropriate)      Problem: Skin Injury Risk (Adult)  Goal: Skin Health and Integrity  Outcome: Ongoing (interventions implemented as appropriate)      Problem: Wound (Includes Pressure Injury) (Adult)  Goal: Signs and Symptoms of Listed Potential Problems Will be Absent, Minimized or Managed (Wound)  Outcome: Ongoing (interventions implemented as appropriate)      Problem: Pneumonia (Adult)  Goal: Signs and Symptoms of Listed Potential Problems Will be Absent, Minimized or Managed (Pneumonia)  Outcome: Ongoing (interventions implemented as appropriate)      Problem: Chronic Obstructive Pulmonary Disease (Adult)  Goal: Signs and Symptoms of Listed Potential Problems Will be Absent, Minimized or Managed (Chronic Obstructive Pulmonary Disease)  Outcome: Ongoing (interventions implemented as appropriate)      Problem: Urinary Tract Infection (Adult)  Goal: Signs and Symptoms of Listed Potential Problems Will be Absent, Minimized or Managed (Urinary Tract Infection)  Outcome: Ongoing (interventions implemented as appropriate)

## 2019-04-11 NOTE — PLAN OF CARE
Problem: Patient Care Overview  Goal: Plan of Care Review  Outcome: Ongoing (interventions implemented as appropriate)   04/11/19 1101   Coping/Psychosocial   Plan of Care Reviewed With patient   Plan of Care Review   Progress no change

## 2019-04-11 NOTE — PROGRESS NOTES
Hospice reevaluation this AM.  Spoke with WILLIAM Saul & WALLY @ bedside with both voicing S/S effectively managed with current regimen.  At this time WALLY voices his wife & patient's grand daughter do not want IV Morphine given unless the patient asks for it.  Advised he does meet the criteria for Hospice Routine care at home, but d/t lack of uncontrolled symptoms he does not meet GIP criteria. Advised we cvan reevaluate at any time.

## 2019-04-12 NOTE — PLAN OF CARE
Problem: Patient Care Overview  Goal: Plan of Care Review  Outcome: Ongoing (interventions implemented as appropriate)   04/12/19 0020   OTHER   Outcome Summary pt appears to be resting well most of night. pain managed well per pt and family.   Coping/Psychosocial   Plan of Care Reviewed With patient   Plan of Care Review   Progress no change     Goal: Individualization and Mutuality  Outcome: Ongoing (interventions implemented as appropriate)    Goal: Discharge Needs Assessment  Outcome: Ongoing (interventions implemented as appropriate)

## 2019-04-12 NOTE — PROGRESS NOTES
Nutrition Services    Patient Name:  Seth Montiel  YOB: 1935  MRN: [id number]  Admit Date:  3/26/2019    Pt is on comfort care, has been NPO 6 days. No further dietetic consult is required unless re eval is requested.     Electronically signed by:  Marta Carvalho  04/12/19 2:30 PM

## 2019-04-12 NOTE — PROGRESS NOTES
[facility] Medicine Services  INPATIENT PROGRESS NOTE    Length of Stay: 17  Date of Admission: 3/26/2019  Primary Care Physician: Seth Arce MD    Subjective   Chief Complaint: weakness  HPI:  83 year old  male with past medical history of COPD, atrial fibrillation, porcine valve replacement, HTN, HLD, tobacco abuse who presented on 3/26/19 with complaints of cough and weakness. He was admitted for community acquired pneumonia, cystitis, COPD exacerbation, supratherapeutic INR.  Despite treatment of his multiple medical issues, the patient's condition has declined.  His family has opted for comfort measures only at this time.  During today's visit, the patient is not responsive.  His daughter and son in law are at bedside.  Daughter reports restlessness this morning and some pain.  Ativan and Morphine have been given this morning, and at this moment patient is resting quietly.    Review of Systems   Unable to perform ROS: Patient unresponsive        All pertinent negatives and positives are as above. All other systems have been reviewed and are negative unless otherwise stated.     Objective    Temp:  [96.9 °F (36.1 °C)] 96.9 °F (36.1 °C)  Heart Rate:  [100-114] 114  Resp:  [16-18] 18  BP: (118-134)/(50-64) 134/50    Physical Exam   Constitutional: No distress.   HENT:   Head: Normocephalic and atraumatic.   Eyes: Conjunctivae and lids are normal.   Neck: Neck supple.   Cardiovascular: An irregularly irregular rhythm present. Tachycardia present. Exam reveals decreased pulses.   Pulses:       Carotid pulses are 3+ on the right side, and 3+ on the left side.  Pulmonary/Chest: No respiratory distress. He has decreased breath sounds.   Shallow breathing pattern noted.   Abdominal: Soft. Bowel sounds are normal.   Musculoskeletal: He exhibits no edema.   Neurological: He is unresponsive.   Skin: There is pallor.   Nursing note and vitals reviewed.        Results  Review:  I have reviewed the labs, radiology results, and diagnostic studies.    Laboratory Data:   Results from last 7 days   Lab Units 04/10/19  0739 04/09/19  0639 04/08/19  0554   SODIUM mmol/L 143 146* 145   POTASSIUM mmol/L 3.6 3.8 3.7   CHLORIDE mmol/L 103 103 97*   CO2 mmol/L 33.0* 31.0* 32.0*   BUN mg/dL 65* 59* 52*   CREATININE mg/dL 2.73* 2.52* 2.48*   GLUCOSE mg/dL 162* 107* 74   CALCIUM mg/dL 8.9 9.1 9.1   ANION GAP mmol/L 7.0 12.0 16.0     Estimated Creatinine Clearance: 19.5 mL/min (A) (by C-G formula based on SCr of 2.73 mg/dL (H)).          Results from last 7 days   Lab Units 04/10/19  0605 04/09/19  0639 04/08/19  0554 04/07/19  0734 04/06/19  0621   WBC 10*3/mm3 10.73 7.39 10.10 9.46 11.40*   HEMOGLOBIN g/dL 6.8* 7.6* 7.1* 7.1* 7.5*   HEMATOCRIT % 22.7* 24.6* 22.8* 22.4* 23.9*   PLATELETS 10*3/mm3 230 283 257 242 229     Results from last 7 days   Lab Units 04/10/19  0605 04/09/19  0639 04/08/19  0554 04/07/19  0734 04/06/19  0621   INR  2.32* 2.22* 2.29* 2.17* 2.42*       Culture Data:   No results found for: BLOODCX  No results found for: URINECX  No results found for: RESPCX  No results found for: WOUNDCX  No results found for: STOOLCX  No components found for: BODYFLD    Radiology Data:   Imaging Results (last 24 hours)     ** No results found for the last 24 hours. **          I have reviewed the patient's current medications.     Assessment/Plan     Active Hospital Problems    Diagnosis   • **Acute on chronic respiratory failure with hypoxia and hypercapnia (CMS/HCC)   • Right nephrolithiasis   • Left kidney mass   • Severe aortic stenosis   • Prosthetic aortic valve stenosis   • Pseudomonas sepsis (CMS/HCC)   • Chronic bronchitis with acute exacerbation (CMS/HCC)   • Coumadin toxicity   • CKD (chronic kidney disease) stage 4, GFR 15-29 ml/min (CMS/HCC)   • Anemia, chronic disease   • Hypertension   • Atrial fibrillation [I48.91]   • Long-term (current) use of anticoagulants       Plan:     -Continue Comfort measures only.    -Morphine scheduled for management of pain/air hunger.  -Ativan PRN for agitation/anxiety.  -Scopolamine for management of secretions.       Discharge planning: Patient appears near death with life expectancy of likely 48 hours of less.  Family at bedside   Patient may qualify for [facility] hospice services.  Scheduled morphine ordered, however, family does not always want it given.  Morphine given three times between yesterday and this morning's visit, and Ativan given this morning for restlessness as well.         This document has been electronically signed by LIZET Brown on April 12, 2019 11:08 AM

## 2019-04-12 NOTE — NURSING NOTE
Re-eval complete.  Pt comfortable at this time, and family states they feel he has not been uncomfortable.  When pain meds administered (twice in past 24 hours), they have been very effective.  Symptoms well controlled with current measures.  Family educated to call us with any needs.  Family and CM educated to call if another evaluation is wanted.  Nikolay Delgado RN-CL; Lissett Davis RN case manager; and LIZET Brown made aware of pt inappropriateness for GIP hospice placement at this time.

## 2019-04-13 NOTE — DISCHARGE SUMMARY
[facility] Medicine Services  DEATH SUMMARY       Date of Admission: 3/26/2019  Date of Death:  4/13/2019 at approximately 1405    Primary Care Physician: Seth Arce MD    Presenting Problem/History of Present Illness:  Pneumonia of left lower lobe due to infectious organism (CMS/[facility]) [J18.1]  Poisoning by warfarin sodium, accidental or unintentional, initial encounter [T45.511A]     Final Death Diagnoses:  Active Hospital Problems    Diagnosis   • **Acute on chronic respiratory failure with hypoxia and hypercapnia (CMS/[facility])   • Right nephrolithiasis   • Left kidney mass   • Severe aortic stenosis   • Prosthetic aortic valve stenosis   • Pseudomonas sepsis (CMS/[facility])   • Chronic bronchitis with acute exacerbation (CMS/[facility])   • Coumadin toxicity   • CKD (chronic kidney disease) stage 4, GFR 15-29 ml/min (CMS/[facility])   • Anemia, chronic disease   • Hypertension   • Atrial fibrillation [I48.91]   • Long-term (current) use of anticoagulants       Consults:   Consults     Date and Time Order Name Status Description    4/6/2019 1059 Inpatient Nephrology Consult      3/29/2019 1209 Inpatient Urology Consult Completed     3/26/2019 2045 Inpatient Cardiology Consult Completed     3/26/2019 1821 Hospitalist (on-call MD unless specified)      2/25/2019 1617 Inpatient Urology Consult Completed     2/24/2019 1710 Inpatient Nephrology Consult Completed           Procedures Performed: Procedure(s):  CYSTOSCOPY, LEFT RETROGRADE,  URETEROSCOPY,  STENT  REMOVAL                Pertinent Test Results:     Hospital Course:  83 year old  male with past medical history of COPD, atrial fibrillation, porcine valve replacement, HTN, HLD, tobacco abuse who presented on 3/26/19 with complaints of cough and weakness. He was admitted for community acquired pneumonia, cystitis, COPD exacerbation, supratherapeutic INR.  Despite treatment of his multiple medical issues, the patient's condition  declined.  His family opted for comfort measures only and the patient passed away on 3/26/19 at approximately 1405 with his family at bedside.             This document has been electronically signed by LIZET Brown on April 13, 2019 2:44 PM

## 2019-04-13 NOTE — PROGRESS NOTES
[facility] Medicine Services  INPATIENT PROGRESS NOTE    Length of Stay: 18  Date of Admission: 3/26/2019  Primary Care Physician: Seth Arce MD    Subjective   Chief Complaint: weakness  HPI:  83 year old  male with past medical history of COPD, atrial fibrillation, porcine valve replacement, HTN, HLD, tobacco abuse who presented on 3/26/19 with complaints of cough and weakness. He was admitted for community acquired pneumonia, cystitis, COPD exacerbation, supratherapeutic INR.  Despite treatment of his multiple medical issues, the patient's condition has declined.  His family has opted for comfort measures only at this time.  During today's visit, the patient is not responsive.  He furrows his eyebrows and grimaces as if he is hurting.  His daughter reports that he has required more frequent medication since yesterday morning.      Review of Systems   Unable to perform ROS: Patient unresponsive        All pertinent negatives and positives are as above. All other systems have been reviewed and are negative unless otherwise stated.     Objective         Physical Exam   Constitutional: He has a sickly appearance. No distress.   HENT:   Head: Normocephalic and atraumatic.   Eyes: Conjunctivae and lids are normal.   Neck: Neck supple.   Cardiovascular: An irregularly irregular rhythm present. Tachycardia present. Exam reveals decreased pulses.   Pulses:       Carotid pulses are 3+ on the right side, and 3+ on the left side.  Pulmonary/Chest: Apnea noted. No respiratory distress. He has decreased breath sounds.   Shallow breathing pattern noted.   Abdominal: Soft. Bowel sounds are normal.   Musculoskeletal: He exhibits no edema.   Neurological: He is unresponsive.   Skin: There is pallor.   Nursing note and vitals reviewed.        Results Review:  I have reviewed the labs, radiology results, and diagnostic studies.    Laboratory Data:   Results from last 7 days    Lab Units 04/10/19  0739 04/09/19  0639 04/08/19  0554   SODIUM mmol/L 143 146* 145   POTASSIUM mmol/L 3.6 3.8 3.7   CHLORIDE mmol/L 103 103 97*   CO2 mmol/L 33.0* 31.0* 32.0*   BUN mg/dL 65* 59* 52*   CREATININE mg/dL 2.73* 2.52* 2.48*   GLUCOSE mg/dL 162* 107* 74   CALCIUM mg/dL 8.9 9.1 9.1   ANION GAP mmol/L 7.0 12.0 16.0     Estimated Creatinine Clearance: 19.4 mL/min (A) (by C-G formula based on SCr of 2.73 mg/dL (H)).          Results from last 7 days   Lab Units 04/10/19  0605 04/09/19  0639 04/08/19  0554 04/07/19  0734   WBC 10*3/mm3 10.73 7.39 10.10 9.46   HEMOGLOBIN g/dL 6.8* 7.6* 7.1* 7.1*   HEMATOCRIT % 22.7* 24.6* 22.8* 22.4*   PLATELETS 10*3/mm3 230 283 257 242     Results from last 7 days   Lab Units 04/10/19  0605 04/09/19  0639 04/08/19  0554 04/07/19  0734   INR  2.32* 2.22* 2.29* 2.17*       Culture Data:   No results found for: BLOODCX  No results found for: URINECX  No results found for: RESPCX  No results found for: WOUNDCX  No results found for: STOOLCX  No components found for: BODYFLD    Radiology Data:   Imaging Results (last 24 hours)     ** No results found for the last 24 hours. **          I have reviewed the patient's current medications.     Assessment/Plan     Active Hospital Problems    Diagnosis   • **Acute on chronic respiratory failure with hypoxia and hypercapnia (CMS/HCC)   • Right nephrolithiasis   • Left kidney mass   • Severe aortic stenosis   • Prosthetic aortic valve stenosis   • Pseudomonas sepsis (CMS/HCC)   • Chronic bronchitis with acute exacerbation (CMS/HCC)   • Coumadin toxicity   • CKD (chronic kidney disease) stage 4, GFR 15-29 ml/min (CMS/HCC)   • Anemia, chronic disease   • Hypertension   • Atrial fibrillation [I48.91]   • Long-term (current) use of anticoagulants       Plan:    -Continue Comfort measures only.    -Morphine 2mg BID added for management of pain/air hunger.  Patient also has morphine 1 mg every two hours if needed.   -Ativan PRN for  agitation/anxiety.  -Scopolamine for management of secretions.   -Atropine added for increased secretions.       Discharge planning: Patient appears near death with life expectancy of likely 24-48 hours or less.  Family at bedside   Patient may qualify for GIP hospice services.  Hospice nurse notified for re-evaluation.        This document has been electronically signed by LIZET Brown on April 13, 2019 12:51 PM

## 2019-04-13 NOTE — PLAN OF CARE
Problem: Patient Care Overview  Goal: Plan of Care Review  Outcome: Ongoing (interventions implemented as appropriate)   04/13/19 4152   OTHER   Outcome Summary Patient seems to be resting peacefully in bed, family to remain at bedisde. Pain managed well throughout the night per pt and family. Comforrt measures remain. Will continue to monitor this patient.    Coping/Psychosocial   Plan of Care Reviewed With patient;family   Plan of Care Review   Progress no change     Goal: Discharge Needs Assessment  Outcome: Ongoing (interventions implemented as appropriate)      Problem: Urinary Tract Infection (Adult)  Goal: Signs and Symptoms of Listed Potential Problems Will be Absent, Minimized or Managed (Urinary Tract Infection)  Outcome: Ongoing (interventions implemented as appropriate)

## 2019-04-14 NOTE — THERAPY DISCHARGE NOTE
Acute Care - Physical Therapy Discharge Summary  [facility]       Patient Name: Seth Montiel  : 1935  MRN: [id number]    Today's Date: 2019  Onset of Illness/Injury or Date of Surgery: 19    Date of Referral to PT: 19  Referring Physician: ERNA Curran MD      Admit Date: 3/26/2019      PT Recommendation and Plan    Visit Dx:    ICD-10-CM ICD-9-CM   1. Pneumonia of left lower lobe due to infectious organism (CMS/HCC) J18.1 486   2. Poisoning by warfarin sodium, accidental or unintentional, initial encounter T45.511A 964.2     E858.2   3. Impaired functional mobility, balance, gait, and endurance Z74.09 V49.89   4. Impaired mobility and activities of daily living Z74.09 799.89   5. Dysphagia, unspecified type R13.10 787.20   6. Hydronephrosis N13.30 591                       PT Discharge Summary  Reason for Discharge: Patient       Aure BRAVO Lefty, PT   2019

## 2019-04-15 NOTE — DISCHARGE SUMMARY
[facility] Medicine Services  DEATH SUMMARY       Date of Admission: 3/26/2019  Date of Death:  4/13/2019 at approximately 1405    Primary Care Physician: Seth Arce MD    Presenting Problem/History of Present Illness:  Pneumonia of left lower lobe due to infectious organism (CMS/[facility]) [J18.1]  Poisoning by warfarin sodium, accidental or unintentional, initial encounter [T45.983A]     Final Death Diagnoses:  Active Hospital Problems    Diagnosis   • **Acute on chronic respiratory failure with hypoxia and hypercapnia (CMS/[facility])   • Right nephrolithiasis   • Left kidney mass   • Severe aortic stenosis   • Prosthetic aortic valve stenosis   • Pseudomonas sepsis (CMS/[facility])   • Chronic bronchitis with acute exacerbation (CMS/[facility])   • Coumadin toxicity   • CKD (chronic kidney disease) stage 4, GFR 15-29 ml/min (CMS/[facility])   • Anemia, chronic disease   • Hypertension   • Atrial fibrillation [I48.91]   • Long-term (current) use of anticoagulants       Consults:   Consults     Date and Time Order Name Status Description    3/29/2019 1209 Inpatient Urology Consult Completed     3/26/2019 2045 Inpatient Cardiology Consult Completed     2/25/2019 1617 Inpatient Urology Consult Completed     2/24/2019 1710 Inpatient Nephrology Consult Completed           Procedures Performed: Procedure(s):  CYSTOSCOPY, LEFT RETROGRADE,  URETEROSCOPY,  STENT  REMOVAL                Pertinent Test Results:     Hospital Course:  83 year old  male with past medical history of COPD, atrial fibrillation, porcine valve replacement, HTN, HLD, tobacco abuse who presented on 3/26/19 with complaints of cough and weakness. He was admitted for community acquired pneumonia, cystitis, COPD exacerbation, supratherapeutic INR.  Despite treatment of his multiple medical issues, the patient's condition declined.  His family opted for comfort measures only and the patient passed away on 4/13/19 at approximately 1405  with his family at bedside.             This document has been electronically signed by LIZET Brown on April 15, 2019 3:11 PM

## (undated) DEVICE — GLV SURG NEOLON 2G PF LF 6.5 STRL

## (undated) DEVICE — CONTAINER,SPECIMEN,OR STERILE,4OZ: Brand: MEDLINE

## (undated) DEVICE — EVAC BLDR UROVAC W ADAPT

## (undated) DEVICE — CATH URETRL OPEN END W/CONNECT 5F 70CM

## (undated) DEVICE — SOL PVPI SPRY BETADINE 3OZ

## (undated) DEVICE — SOL IRR H2O BTL 1000ML STRL

## (undated) DEVICE — SOL IRR NACL 0.9PCT 3000ML

## (undated) DEVICE — GW PTFE FIX/CORE FLXTIP .038 3X150CM

## (undated) DEVICE — DRAINBAG,ANTI-REFLUX TOWER,L/F,2000ML,LL: Brand: MEDLINE

## (undated) DEVICE — PK CYSTO LF 60

## (undated) DEVICE — BASKT HELICAL GEMENI 4WIRE FILIFORM 4/5F 120CM 14MM

## (undated) DEVICE — CATH URETRL OPN/END 5F70CM

## (undated) DEVICE — SOL IRRG H2O PL/BG 1000ML STRL

## (undated) DEVICE — GLV SURG NEOLON 2G PF LF 7.5 STRL

## (undated) DEVICE — CATHETER,FOLEY,100%SILICONE,16FR,10ML,LF: Brand: MEDLINE

## (undated) DEVICE — STERILE POLYISOPRENE POWDER-FREE SURGICAL GLOVES WITH EMOLLIENT COATING: Brand: PROTEXIS